# Patient Record
Sex: MALE | Race: OTHER
[De-identification: names, ages, dates, MRNs, and addresses within clinical notes are randomized per-mention and may not be internally consistent; named-entity substitution may affect disease eponyms.]

---

## 2022-03-08 ENCOUNTER — HOSPITAL ENCOUNTER (INPATIENT)
Dept: HOSPITAL 94 - ADULT MH | Age: 37
LOS: 16 days | Discharge: HOME | DRG: 750 | End: 2022-03-24
Attending: PSYCHIATRY & NEUROLOGY | Admitting: PSYCHIATRY & NEUROLOGY
Payer: MEDICAID

## 2022-03-08 VITALS — BODY MASS INDEX: 33.87 KG/M2 | HEIGHT: 77 IN | WEIGHT: 286.82 LBS

## 2022-03-08 DIAGNOSIS — Z71.6: ICD-10-CM

## 2022-03-08 DIAGNOSIS — Z86.16: ICD-10-CM

## 2022-03-08 DIAGNOSIS — F31.9: ICD-10-CM

## 2022-03-08 DIAGNOSIS — Z91.410: ICD-10-CM

## 2022-03-08 DIAGNOSIS — F19.10: ICD-10-CM

## 2022-03-08 DIAGNOSIS — K64.9: ICD-10-CM

## 2022-03-08 DIAGNOSIS — F12.10: ICD-10-CM

## 2022-03-08 DIAGNOSIS — G89.29: ICD-10-CM

## 2022-03-08 DIAGNOSIS — R42: ICD-10-CM

## 2022-03-08 DIAGNOSIS — R45.851: ICD-10-CM

## 2022-03-08 DIAGNOSIS — F17.210: ICD-10-CM

## 2022-03-08 DIAGNOSIS — Z23: ICD-10-CM

## 2022-03-08 DIAGNOSIS — L29.9: ICD-10-CM

## 2022-03-08 DIAGNOSIS — F41.9: ICD-10-CM

## 2022-03-08 DIAGNOSIS — Z59.00: ICD-10-CM

## 2022-03-08 DIAGNOSIS — Z79.899: ICD-10-CM

## 2022-03-08 DIAGNOSIS — F25.0: Primary | ICD-10-CM

## 2022-03-08 DIAGNOSIS — Z88.5: ICD-10-CM

## 2022-03-08 PROCEDURE — 93005 ELECTROCARDIOGRAM TRACING: CPT

## 2022-03-08 PROCEDURE — 36415 COLL VENOUS BLD VENIPUNCTURE: CPT

## 2022-03-08 PROCEDURE — 83036 HEMOGLOBIN GLYCOSYLATED A1C: CPT

## 2022-03-08 PROCEDURE — 90732 PPSV23 VACC 2 YRS+ SUBQ/IM: CPT

## 2022-03-08 PROCEDURE — 80164 ASSAY DIPROPYLACETIC ACD TOT: CPT

## 2022-03-08 PROCEDURE — 87081 CULTURE SCREEN ONLY: CPT

## 2022-03-08 PROCEDURE — 80061 LIPID PANEL: CPT

## 2022-03-08 SDOH — ECONOMIC STABILITY - HOUSING INSECURITY: HOMELESSNESS UNSPECIFIED: Z59.00

## 2022-03-09 VITALS — DIASTOLIC BLOOD PRESSURE: 85 MMHG | SYSTOLIC BLOOD PRESSURE: 136 MMHG

## 2022-03-09 PROCEDURE — 3E0234Z INTRODUCTION OF SERUM, TOXOID AND VACCINE INTO MUSCLE, PERCUTANEOUS APPROACH: ICD-10-PCS | Performed by: INTERNAL MEDICINE

## 2022-03-09 PROCEDURE — 3E02340 INTRODUCTION OF INFLUENZA VACCINE INTO MUSCLE, PERCUTANEOUS APPROACH: ICD-10-PCS | Performed by: INTERNAL MEDICINE

## 2022-03-09 RX ADMIN — OLANZAPINE SCH MG: 5 TABLET, ORALLY DISINTEGRATING ORAL at 21:18

## 2022-03-09 NOTE — NUR
Pt admitted from [facility] in [place]. Pt. appeared to be experiencing EPS upon arrival to 
unit, client appeared agitated, stating he was unable to control his eye movements with his 
eyes fixed to the ceiling. IM Ativan and Benadryl administered with effective results. Pt. 
experiencing auditory hallucinations telling him to kill himself and he should be dead. Pt. 
placed on 5150 for DTS/GD.

## 2022-03-10 VITALS — DIASTOLIC BLOOD PRESSURE: 91 MMHG | SYSTOLIC BLOOD PRESSURE: 133 MMHG

## 2022-03-10 VITALS — DIASTOLIC BLOOD PRESSURE: 77 MMHG | SYSTOLIC BLOOD PRESSURE: 133 MMHG

## 2022-03-10 LAB
CHOLEST SERPL-MCNC: 129 MG/DL (ref 0–200)
CHOLEST/HDLC SERPL: 3.1 {RATIO} (ref 0–4.99)
HBA1C MFR BLD: 5.5 % (ref 4.5–6.2)
HDLC SERPL-MCNC: 41 MG/DL (ref 35–60)
LDLC SERPL DIRECT ASSAY-MCNC: 76 MG/DL (ref 50–100)
TRIGL SERPL-MCNC: 70 MG/DL (ref 20–135)

## 2022-03-10 RX ADMIN — NICOTINE POLACRILEX PRN MG: 2 LOZENGE ORAL at 17:06

## 2022-03-10 RX ADMIN — DIVALPROEX SODIUM SCH MG: 250 TABLET, EXTENDED RELEASE ORAL at 20:04

## 2022-03-10 RX ADMIN — NICOTINE SCH PATCH: 21 PATCH, EXTENDED RELEASE TRANSDERMAL at 07:31

## 2022-03-10 RX ADMIN — OLANZAPINE SCH MG: 5 TABLET, ORALLY DISINTEGRATING ORAL at 07:30

## 2022-03-10 NOTE — NUR
Nursing Progress Note:



Legal hold: 5150



Client on involuntary status for DTS/DG



Report received from AGNES Demarco with use of SBAR



Why they are here:  Pt admitted from [facility] in [place]. Pt. appeared to be experiencing 
EPS upon arrival to unit, client appeared agitated, stating he was unable to control his eye 
movements with his eyes fixed to the ceiling. IM Ativan and Benadryl administered with 
effective results. Pt. experiencing auditory hallucinations telling him to kill himself and 
he should be dead. Pt. placed on 5150 for DTS/GD.





Assessment

What has happened this shift:  Patient is resting quietly in bed at the start of the shift.  
Cooperative with 1:1 assessment and medications.  Endorses hearing voices which he describes 
as sounding demonic and which tell him to kill himself.  Complains of anxiety r/t hearing 
voices.  PRN Ativan is given which appears somewhat helpful.  In the late morning the 
patient has an episode of yelling and swearing.  Provider is notified who gives an order for 
IM Benadryl/ Haldol/ Ativan which is effective.  



S/I, H/I: Denies

A/VH: Denies

Sleep: 1 hour in the morning

ADL's: Independent 

Group attendance: No

Were meds taken: Yes

Any med S/E: None observed or reported





Mental Status Exam



Appearance: Younger male, disheveled, dressed in scrubs 

Eye contact: Poor

Behavior: Cooperative 

Speech: Clear, normal rate/ volume

Mood: Fine.

Affect: Blunted with some brightening

Thought process: Circumstantial

Thought Content: Demonic voices that tell him to kill himself.

Cognition: A&O X4

Insight: Poor

Judgment: Poor



Interventions



PRN's used: Tylenol, Ativan 

Therapeutic interventions: Maintained a safe and therapeutic environment, ensured contract 
for safety, provided clear and simple instructions, monitored behaviors and need for 
intervention, provided active listening and positive encouragement, encouraged participation 
on the unit, and maintained q 15min safety checks. 

Restraints/seclusion/emergency medication: IM Benadryl/ Haldol/ Ativan

Justification of Continued Inpatient Treatment: PA pt. just had a recent medication 
adjustment and requires time to allow medications to reach their therapeutic effect. He will 
discharge sometime next week.

## 2022-03-10 NOTE — NUR
EMERGENCY IM



Pt was pacing the hallway, becoming increasingly agitated, stating that he doesn't want to 
be here and he hates taking Zyprexa. He was sitting in the rec room with his fists balled 
up, so called ZULEMA Rodriguez, during the phone call patient became more aggressive, screaming 
profanities. Haldol 10 mg IM, Benadryl 50 mg IM, and Ativan 2 mg IM were ordered. Security 
was at the bedside while injection was administered, and pt was cooperative with the 
process. 10 min after the injection, pt apologized to staff, stating "I just get so sick of 
this. I always feel suicidal".

## 2022-03-10 NOTE — NUR
Nursing Progress Note:



Legal hold: 5150



Client on involuntary status for DTS/DG



Report received from AGNES Alcaraz with use of SBAR



Why they are here:  Pt admitted from [facility] in [place]. Pt. appeared to be experiencing 
EPS upon arrival to unit, client appeared agitated, stating he was unable to control his eye 
movements with his eyes fixed to the ceiling. IM Ativan and Benadryl administered with 
effective results. Pt. experiencing auditory hallucinations telling him to kill himself and 
he should be dead. Pt. placed on 5150 for DTS/GD.





Assessment

What has happened this shift? Patient was found standing in sing[address] way at change of shift. 
Patient was polite and cooperative. Patient displayed manic behavior and kept asking for 
medications including ones he didnt have scheduled. Patient first asked for Tylenol for his 
right foot pain. Then he ask the nurse if he could have something for his hemorrhoids that 
had burst. Nurse messaged  and got an order for Proctofoam-hc. Patient was given 
scheduled Flu vaccine in L arm and Pneumonia vaccine in R arm. Patient took night 
medications including prn trazodone. Patient had to be given trazodone repeat. Patient was 
very talkative and spent a majority of the shift pacing around the unit and fidgeting with 
his headphones.  



S/I, H/I: Denies

A/VH: Denies

Sleep: See sleep assessment 

ADL's: independent 

Group attendance: n/a

Were meds taken: Yes

Any med S/E: None





Mental Status Exam



Appearance: Younger male, disheveled, dressed in scrubs 

Eye contact: Good

Behavior: Cooperative, talkative 

Speech: Clear

Mood: manic 

Affect: Blunted with some brightening

Thought process: Linear

Thought Content: medications

Cognition: A&O X4

Insight: Poor

Judgment: Poor



Interventions



PRN's used: Trazodone x2, Tylenol 



Therapeutic interventions: Maintained a safe and therapeutic environment, ensured contract 
for safety, provided clear and simple instructions, monitored behaviors and need for 
intervention, provided active listening and positive encouragement, encouraged participation 
on the unit, and maintained q 15min safety checks. 

Restraints/seclusion/emergency medication: N/A





Justification of Continued Inpatient Treatment: PA pt. just had a recent medication 
adjustment and requires time to allow medications to reach their therapeutic effect. He will 
discharge sometime next week.

## 2022-03-11 VITALS — SYSTOLIC BLOOD PRESSURE: 140 MMHG | DIASTOLIC BLOOD PRESSURE: 100 MMHG

## 2022-03-11 VITALS — DIASTOLIC BLOOD PRESSURE: 58 MMHG | SYSTOLIC BLOOD PRESSURE: 90 MMHG

## 2022-03-11 RX ADMIN — NICOTINE SCH PATCH: 21 PATCH, EXTENDED RELEASE TRANSDERMAL at 07:29

## 2022-03-11 RX ADMIN — DIVALPROEX SODIUM SCH MG: 250 TABLET, EXTENDED RELEASE ORAL at 20:18

## 2022-03-11 RX ADMIN — PALIPERIDONE SCH MG: 3 TABLET, EXTENDED RELEASE ORAL at 07:29

## 2022-03-11 NOTE — NUR
Psychosocial assessment. 



Mike is a 35 y/o male who was placed on 5150 by [facility] for 

danger to self and grave disability. Mike was initially placed on 

5150 on 2/28/22 and was positive for Covid. He continues to endorse SI 

with a plan to rip his eyes out. He reported command auditory 

hallucinations telling him to kill himself . He has a long history of 

mental health treatment and was released from [facility] in 2021. He 

reported on-going SI with worsening symptoms since being released from 

[facility]. 



Mike reported he was living with his brother in [place] but 

has "disowned" his family is now homeless. He was fixated on ways to kill 

himself and that he will do so once released. He also stated he does not feel safe.  He 
reported he would like to 

be conserved again. He denied any HI. He reported command auditory 

hallucinations telling him to harm himself.



Plan: Writer will work with Mike on a discharge plan. Writer will inquire

if [facility] plans on conserving him again.



SIDDHARTHA Salamanca

-------------------------------------------------------------------------------

Addendum: 03/11/22 at 1447 by Silvana TEJADA

-------------------------------------------------------------------------------

Amended: Links added.

## 2022-03-11 NOTE — NUR
Nursing Progress Note:



Legal hold: 5150



Client on involuntary status for DTS/DG



Report received from AGNES Alcaraz with use of SBAR



Why they are here:  Pt admitted from [facility] in [place]. Pt. appeared to be experiencing 
EPS upon arrival to unit, client appeared agitated, stating he was unable to control his eye 
movements with his eyes fixed to the ceiling. IM Ativan and Benadryl administered with 
effective results. Pt. experiencing auditory hallucinations telling him to kill himself and 
he should be dead. Pt. placed on 5150 for DTS/GD.





Assessment

What has happened this shift: 



Patient found nurse at change of shift. Patient asked nurse for some anxiety medication. 
Nurse gave patient prn Ativan. Patient then came to nurse asking for Tylenol for his foot. 
Patient came up to tech 20 minuets later stating he is feeling anxious and suicidal. Tech 
offered patient warm broth and assisted him to his room to lay down. Nurse checks on patient 
a few minuets later and patient asked if he could have another B2 shot. Nurse encouraged 
patient to allow time for the Ativan to kick in while charge calls doctor to see if we can 
have B2 on stand by in case the Ativan isn't enough. Nurse checked on patient a little while 
later and patent was observed laughing and talking with other patients. Patient took all 
night medications including prn trazodone to help him sleep. Patient took medications 
without issue and went to bed. 



S/I, H/I: Denies

A/VH: Denies

Sleep: See sleep assessment 

ADL's: Independent 

Group attendance: No

Were meds taken: Yes

Any med S/E: None observed or reported





Mental Status Exam



Appearance: Younger male, disheveled, dressed in personal clothing 

Eye contact: Poor

Behavior: Cooperative 

Speech: Clear, normal rate/ volume

Mood: Fine.

Affect: Blunted with some brightening

Thought process: Circumstantial

Thought Content: Getting another B2 shot

Cognition: A&O X4

Insight: Poor

Judgment: Poor



Interventions



PRN's used: Tylenol, Ativan 



Therapeutic interventions: Maintained a safe and therapeutic environment, ensured contract 
for safety, provided clear and simple instructions, monitored behaviors and need for 
intervention, provided active listening and positive encouragement, encouraged participation 
on the unit, and maintained q 15min safety checks. 



Restraints/seclusion/emergency medication: IM Benadryl/ Haldol/ Ativan

Justification of Continued Inpatient Treatment: PA pt. just had a recent medication 
adjustment and requires time to allow medications to reach their therapeutic effect. He will 
discharge sometime next week.

## 2022-03-11 NOTE — NUR
Nursing Progress Note: [place]



Legal hold: 5150



Client on involuntary status for DTS/DG



Report received from AGNES Demarco with use of SBAR



Why they are here:  Pt admitted from [facility] in [place]. Pt. appeared to be experiencing 
EPS upon arrival to unit, client appeared agitated, stating he was unable to control his eye 
movements with his eyes fixed to the ceiling. IM Ativan and Benadryl administered with 
effective results. Pt. experiencing auditory hallucinations telling him to kill himself and 
he should be dead. Pt. placed on 5150 for DTS/GD.



Assessment



What has happened this shift:  Patient is resting quietly in bed at the start of the shift.  
Cooperative with 1:1 assessment and medications.  Complains of anxiety in the morning r/t 
AH.  PRN Ativan is given which patient states is ineffective.  Headphones are given and the 
patient paces the unit.  He states, I tried walking and that breathing exercise.  I tried 
to lay down.  I tried everything and Im still panicking.  Provider notified who gives new 
orders for Klonopin.  Klonopin is given but patient complains that it is ineffective.  
Patient is rubbing his eyes vigorously and stating, They keep rolling back and I cant stop 
them.  Im going to go crazy!  Agitation continues to elevate.  Provider aware and gives a 
new order for IM Benadryl/ haldol/ ativan.  Patient becomes calmer almost immediately and 
returns to pacing the unit.



S/I, H/I: SI related to AH.  No plan

A/VH: Command AH telling him to kill himself

Sleep: 2 hours in the morning

ADL's: Independent 

Group attendance: Yes

Were meds taken: Yes

Any med S/E: None observed or reported





Mental Status Exam



Appearance: Younger male, disheveled, dressed in scrubs 

Eye contact: Poor

Behavior: Cooperative 

Speech: Clear, normal rate/ volume

Mood: anxious

Affect: Blunted with some brightening

Thought process: Circumstantial

Thought Content: Internally preoccupied

Cognition: A&O X4

Insight: Poor

Judgment: Poor



Interventions



PRN's used: Tylenol, Motrin, Ativan

Therapeutic interventions: Maintained a safe and therapeutic environment, ensured contract 
for safety, provided clear and simple instructions, monitored behaviors and need for 
intervention, provided active listening and positive encouragement, encouraged participation 
on the unit, and maintained q 15min safety checks. 

Restraints/seclusion/emergency medication: IM Benadryl/ Haldol/ Ativan

Justification of Continued Inpatient Treatment: PA pt. just had a recent medication 
adjustment and requires time to allow medications to reach their therapeutic effect. He will 
discharge sometime next week.


-------------------------------------------------------------------------------

Addendum: 03/11/22 at 1703 by Erin Julian RN

-------------------------------------------------------------------------------

In the late afternoon the patient again becomes increasingly agitated grabbing his head and 
hitting walls and door.  Provider is present who gives another order for IM benadryl/ 
haldol/ ativan.

## 2022-03-11 NOTE — NUR
Left message for Chiki Sims, Clinician with [facility] (ph# [phone]), to inquire 
about conservatorship.



SIDDHARTHA Salamanca

## 2022-03-12 VITALS — SYSTOLIC BLOOD PRESSURE: 131 MMHG | DIASTOLIC BLOOD PRESSURE: 75 MMHG

## 2022-03-12 VITALS — DIASTOLIC BLOOD PRESSURE: 81 MMHG | SYSTOLIC BLOOD PRESSURE: 122 MMHG

## 2022-03-12 RX ADMIN — NICOTINE POLACRILEX PRN MG: 2 LOZENGE ORAL at 17:48

## 2022-03-12 RX ADMIN — DIVALPROEX SODIUM SCH MG: 250 TABLET, EXTENDED RELEASE ORAL at 20:22

## 2022-03-12 RX ADMIN — NICOTINE SCH PATCH: 21 PATCH, EXTENDED RELEASE TRANSDERMAL at 07:30

## 2022-03-12 RX ADMIN — BENZTROPINE MESYLATE SCH MG: 1 TABLET ORAL at 20:22

## 2022-03-12 RX ADMIN — PALIPERIDONE SCH MG: 3 TABLET, EXTENDED RELEASE ORAL at 07:31

## 2022-03-12 NOTE — NUR
Nursing Progress Note: [place]



Legal hold: 5150



Client on involuntary status for DTS/DG



Report received from AGNES Luu with use of SBAR



Why they are here:  Pt admitted from [facility] in [place]. Pt. appeared to be experiencing 
EPS upon arrival to unit, client appeared agitated, stating he was unable to control his eye 
movements with his eyes fixed to the ceiling. IM Ativan and Benadryl administered with 
effective results. Pt. experiencing auditory hallucinations telling him to kill himself and 
he should be dead. Pt. placed on 5150 for DTS/GD.



Assessment



What has happened this shift:  Patient is resting quietly in bed at the start of the shift.  
Cooperative with 1:1 assessment and medications.  PRN Tylenol given for left ankle pain 
which appears effective.  Patient complains of anxiety.  PRN Ativan is given.  Shortly 
thereafter the patient approaches nursing complaining that his eyes are rolling back in his 
head again and he cant stop it.  Provider notified who gives order for Cogentin x1.  
Patient verbalizes relief from eye rolling but complains that the voices he hears are making 
him anxious.  Patient goes to his room where he begins hitting walls and repeating, I want 
to die!  New order for IM Benadryl/ Haldol/ Ativan is given and appears effective.



S/I, H/I: SI related to AH.  No plan

A/VH: Command AH telling him to kill himself

Sleep: 1 hour in the morning

ADL's: Independent 

Group attendance: NA

Were meds taken: Yes

Any med S/E: None observed or reported





Mental Status Exam



Appearance: Tall man, disheveled, dressed in green unit scrubs 

Eye contact: Poor

Behavior: Cooperative, agitated at times

Speech: Clear, normal rate/ volume

Mood: Terrible.

Affect: Blunted 

Thought process: Circumstantial

Thought Content: Internally preoccupied.  Concerned over his eyes rolling back in his head

Cognition: A&O X4

Insight: Poor

Judgment: Poor



Interventions



PRN's used: Tylenol, Ativan

Therapeutic interventions: Maintained a safe and therapeutic environment, ensured contract 
for safety, provided clear and simple instructions, monitored behaviors and need for 
intervention, provided active listening and positive encouragement, encouraged participation 
on the unit, and maintained q 15min safety checks. 

Restraints/seclusion/emergency medication: IM Benadryl/ Haldol/ Ativan

Justification of Continued Inpatient Treatment: PA pt. just had a recent medication 
adjustment and requires time to allow medications to reach their therapeutic effect. He will 
discharge sometime next week.

## 2022-03-12 NOTE — NUR
Nursing Progress Note: [place]



Legal hold: 5150



Client on involuntary status for DTS/DG



Report received from RN with use of SBAR



Why they are here:  Pt admitted from [facility] in [place]. Pt. appeared to be experiencing 
EPS upon arrival to unit, client appeared agitated, stating he was unable to control his eye 
movements with his eyes fixed to the ceiling. IM Ativan and Benadryl administered with 
effective results. Pt. experiencing auditory hallucinations telling him to kill himself and 
he should be dead. Pt. placed on 5150 for DTS/GD.



Assessment



What has happened this shift:  Pt in hallway at shift change and approaches this nurse 
stating that he has had a bad day and is very anxious. When asked what is causing him to be 
anxious pt reports being depressed and wanting to kill himself. Pt states that his legs are 
cramping up due to shaking so much and being tense. Pt has Klonopin 1mg, Gave early to pt 
for anxiety and Tylenol for leg cramping. Brought pt snacks and juice. Pt was then calm 
lying in bed. Brought pt evening meds at med pass, pt took meds w/o complications. Pt went 
to bed shortly after.

S/I, H/I: SI related to AH.  No plan

A/VH: Command AH telling him to kill himself

Sleep:See sleep hours

ADL's: Independent 

Group attendance: NA

Were meds taken: Yes

Any med S/E: None observed or reported





Mental Status Exam



Appearance: Tall man, disheveled, dressed in green unit scrubs 

Eye contact: Poor

Behavior: Cooperative, agitated at times

Speech: Clear, normal rate/ volume

Mood:bad day

Affect: Blunted 

Thought process: Circumstantial

Thought Content: Internally preoccupied. depressed

Cognition: A&O X4

Insight: Poor

Judgment: Poor



Interventions



PRN's used: Tylenol, Trazodone 50mg

Therapeutic interventions: Maintained a safe and therapeutic environment, ensured contract 
for safety, provided clear and simple instructions, monitored behaviors and need for 
intervention, provided active listening and positive encouragement, encouraged participation 
on the unit, and maintained q 15min safety checks. 

Restraints/seclusion/emergency medication: IM Benadryl/ Haldol/ Ativan

Justification of Continued Inpatient Treatment: PA pt. just had a recent medication 
adjustment and requires time to allow medications to reach their therapeutic effect. He will 
discharge sometime next week.

## 2022-03-12 NOTE — NUR
Initial: Pt admit for schizoaffective disorder. Currently on a regular diet 

and eating well with 100% PO intake of all meals with the exception of 

only 50% PO intake of starch at lunch 3/11. Overall pt meeting estimated 

nutrient needs. LBM 3/11. No nutrition intervention implemented at this 

time. Will continue to follow.

Recommendations:

1) Continue regular diet

2) Monitor need for additional protein for satiety; offer snacks

3) Bowel care PRN

4) Weekly scaled weights

-------------------------------------------------------------------------------

Addendum: 03/12/22 at 1309 by Venita Choudhary RD

-------------------------------------------------------------------------------

Amended: Links added.

## 2022-03-13 VITALS — SYSTOLIC BLOOD PRESSURE: 104 MMHG | DIASTOLIC BLOOD PRESSURE: 64 MMHG

## 2022-03-13 VITALS — SYSTOLIC BLOOD PRESSURE: 118 MMHG | DIASTOLIC BLOOD PRESSURE: 51 MMHG

## 2022-03-13 RX ADMIN — NICOTINE SCH PATCH: 21 PATCH, EXTENDED RELEASE TRANSDERMAL at 07:58

## 2022-03-13 RX ADMIN — NICOTINE POLACRILEX PRN MG: 2 LOZENGE ORAL at 11:19

## 2022-03-13 RX ADMIN — NICOTINE POLACRILEX PRN MG: 2 LOZENGE ORAL at 20:39

## 2022-03-13 RX ADMIN — BENZTROPINE MESYLATE SCH MG: 1 TABLET ORAL at 07:59

## 2022-03-13 RX ADMIN — BENZTROPINE MESYLATE SCH MG: 1 TABLET ORAL at 20:34

## 2022-03-13 RX ADMIN — DIVALPROEX SODIUM SCH MG: 250 TABLET, EXTENDED RELEASE ORAL at 20:34

## 2022-03-13 NOTE — NUR
Nursing Progress Note: 



Legal hold: 5250



Client on involuntary status for DTS/DG



Report received from ENIO Delaney with use of SBAR



Why they are here:  Pt admitted from [facility] in [place]. Pt. appeared to be experiencing 
EPS upon arrival to unit, client appeared agitated, stating he was unable to control his eye 
movements with his eyes fixed to the ceiling. IM Ativan and Benadryl administered with 
effective results. Pt. experiencing auditory hallucinations telling him to kill himself and 
he should be dead. Pt. placed on 5150 for DTS/GD.



Assessment



Received patient while he was ambulated up and down the hallways before breakfast. Went to 
patients room to administer his 0800 medications, complete Physical Assessment and 1:1 
Patient Interview. Patient reports I have been hearing voices.  Im not sure what they want 
me to do, but I still hear them.  Patient went to the Community Room for breakfast and 
socialized with others. Patient pleasant when interacting with others. Patient slept most of 
the morning, and was awake for snack then lunch. At approximately 1226, patient had seen 
PHILIPPE Andrade. Lupe ordered Haldol 5mg and Ativan 1mg po now. Given at 1226. Patient continued 
to experience hallucinations, not known if they are auditory or visual hallucinations as 
patient did not disclose. Encourage the patient to lie down on his bed and rest after these 
medications administered. At approximately 1446, Lupe gave a verbal order for Haldol 2mg po 
which was given immediately. Again, reinforced that patient lie down in his bed, and patient 
was walking up and down the hallways with his eyes almost closed. Patient went directly to 
Lupe at 1641 and Lupe gave an order for Ativan 1mg po now. Patient asked for Tylenol for 
his foot at 1700. Patient was redirected to return to his room. (1730) Patient informed that 
resting does not help and stated I want to leave today.  Informed patient that he can 
discuss the medications with the MD in the morning. Patient left the door of the charting 
room and returned to the dining room. Will continue to monitor patient closely.  At 1746, 
the patient walked up to the fire door near room 330, and started hitting the door, 
approximately 5 times saying You arent doing any damn thing for me here and Im leaving.  
Lupe consulted and patient received Geodon 20mg IM, Benadryl 50 mg IM, and Ativan 2mg IM. 
Security was called when patient started hitting the fire doors and responded promptly.      
 





S/I, H/I: Denies

A/VH: Patient states Im having hallucinations, but will not disclose if they are auditory 
or 

Sleep: 2 hour morning nap.

ADL's: Independent 

Group attendance: No Group Meeting held today.

Were meds taken: Yes, without hesitation.

Any med S/E: None observed or reported





Mental Status Exam



Appearance: Tall man, disheveled, dressed in green unit scrubs 

Eye contact: Poor

Behavior: Cooperative, agitated at times

Speech: Clear, normal rate/ volume

Mood: Shauna been hearing voices. 

Affect: Blunted 

Thought process: Circumstantial

Thought Content: 

Cognition: A&O X4

Insight: Poor

Judgment: Poor



Interventions



PRN's used: Tylenol, Trazodone 50mg

Therapeutic interventions: Maintained a safe and therapeutic environment, ensured contract 
for safety, provided clear and simple instructions, monitored behaviors and need for 
intervention, provided active listening and positive encouragement, encouraged participation 
on the unit, and maintained q 15min safety checks. 

Restraints/seclusion/emergency medication: IM Benadryl/ Haldol/ Ativan

Justification of Continued Inpatient Treatment: PA pt. just had a recent medication 
adjustment and requires time to allow medications to reach their therapeutic effect. He will 
discharge sometime next week.

## 2022-03-14 VITALS — SYSTOLIC BLOOD PRESSURE: 98 MMHG | DIASTOLIC BLOOD PRESSURE: 48 MMHG

## 2022-03-14 VITALS — SYSTOLIC BLOOD PRESSURE: 133 MMHG | DIASTOLIC BLOOD PRESSURE: 82 MMHG

## 2022-03-14 RX ADMIN — NICOTINE POLACRILEX PRN MG: 2 LOZENGE ORAL at 12:11

## 2022-03-14 RX ADMIN — NICOTINE SCH PATCH: 21 PATCH, EXTENDED RELEASE TRANSDERMAL at 08:57

## 2022-03-14 RX ADMIN — NICOTINE POLACRILEX PRN MG: 2 LOZENGE ORAL at 19:45

## 2022-03-14 RX ADMIN — BENZTROPINE MESYLATE SCH MG: 1 TABLET ORAL at 08:56

## 2022-03-14 RX ADMIN — BENZTROPINE MESYLATE SCH MG: 1 TABLET ORAL at 21:06

## 2022-03-14 RX ADMIN — NICOTINE POLACRILEX PRN MG: 2 LOZENGE ORAL at 17:51

## 2022-03-14 RX ADMIN — DIVALPROEX SODIUM SCH MG: 250 TABLET, EXTENDED RELEASE ORAL at 21:07

## 2022-03-14 NOTE — NUR
Nursing Progress Note: 



Legal hold: 5250



Client on involuntary status for DTS/DG



Report received from ENIO Delaney with use of SBAR



Why they are here:  Pt admitted from [facility] in [place]. Pt. appeared to be experiencing 
EPS upon arrival to unit, client appeared agitated, stating he was unable to control his eye 
movements with his eyes fixed to the ceiling. IM Ativan and Benadryl administered with 
effective results. Pt. experiencing auditory hallucinations telling him to kill himself and 
he should be dead. Pt. placed on 5150 for DTS/GD.



Assessment

Pt asleep at shift change. Pt woken around 1900 by roommate. Asked pt how he was feeling, pt 
stated he was fine, asked he was hearing voices, "not right now." Pt joined cohorts in 
community room. Pt ate snack at snack time, asked for a sandwich  because he did not get a 
dinner. Chips and a sandwich were provided. Pt asked for a nicotine lozenge with evening 
meds. Pt took evening meds w/o complications. Pt went to bed shortly after. 

S/I, H/I: Denies

A/VH: Denies, "not right now"

Sleep: See sleep hours

ADL's: Independent 

Group attendance: No Group in the evening

Were meds taken: Yes, without hesitation.

Any med S/E: None observed or reported





Mental Status Exam



Appearance: Tall man, disheveled, dressed in green unit scrubs 

Eye contact: Poor

Behavior: Cooperative,groggy

Speech: Clear, normal rate/ volume

Mood: sluggish, tired

Affect: Blunted 

Thought process: Circumstantial

Thought Content: 

Cognition: A&O X4

Insight: Poor

Judgment: Poor



Interventions



PRN's used:  Trazodone 50mg, Nicotine lozenge

Therapeutic interventions: Maintained a safe and therapeutic environment, ensured contract 
for safety, provided clear and simple instructions, monitored behaviors and need for 
intervention, provided active listening and positive encouragement, encouraged participation 
on the unit, and maintained q 15min safety checks. 

Restraints/seclusion/emergency medication: IM Benadryl/ Haldol/ Ativan

Justification of Continued Inpatient Treatment: PA pt. just had a recent medication 
adjustment and requires time to allow medications to reach their therapeutic effect. He will 
discharge sometime next week.

## 2022-03-14 NOTE — NUR
Nursing Progress Note: 

Legal hold: 5250

Client on involuntary status for DTS/DG

Report received from ENIO Naidu with use of SBAR

Why they are here:  Pt admitted from [facility] in [place]. Pt. appeared to be experiencing 
EPS upon arrival to unit, client appeared agitated, stating he was unable to control his eye 
movements with his eyes fixed to the ceiling. IM Ativan and Benadryl administered with 
effective results. Pt. experiencing auditory hallucinations telling him to kill himself and 
he should be dead. Pt. placed on 5150 for DTS/GD.

Assessment

RN received pt. asleep at start of shift. Pt. refused breakfast and went back to sleep. Pt. 
awoke mid-morning. 1:1 done at bedside. Pt. endorses hearing voices, but not currently. Pt. 
reports anxiety about his discharge plan. Pt. spoke with his brother and seemed to be happy 
that his brother offered to let him come back to live with him. However, pt. later stated, 
I destroyed my brothers earpods, hes going to be pissed when I tell him I actually dont 
want to go back to live with my brother. Pt. then states that he wants to be conserved. Pt. 
becomes increasingly agitated throughout the afternoon and given PRN Atarax 100mg po and 
Haldol 5mg po with minimal effect. RN received order for Ativan 2mg IM and pt. received with 
good effect. 

S/I, H/I: Denies

A/VH: Not right now.  

Sleep: Pt. slept in until mid-morning. Pt. napped intermittently throughout the day. 

ADL's: Independent. Pt. showered today. 

Group attendance: No

Were meds taken: Yes

Any med S/E: Denies. None observed. 

Mental Status Exam

Appearance: Tall man, disheveled, wearing green unit scrubs 

Eye contact: Minimal 

Behavior: Cooperative, but easily becomes anxious and agitated. Pt. socially withdrawn. 
Pacing the hallway. 

Speech: Pt. becomes hyperverbal with pressured speech at times.

Mood: Anxious and agitated. 

Affect: Blunted 

Thought process: Circumstantial

Thought Content: Concern about discharge plan. 

Cognition: A&O X4

Insight: Poor

Judgment: Poor

Interventions

PRN's used:  Atarax 100mg x1, Haldol 5mg po. Ativan 2mg IM 

Therapeutic interventions: Maintained a safe and therapeutic environment, ensured contract 
for safety, provided clear and simple instructions, monitored behaviors and need for 
intervention, provided active listening and positive encouragement, encouraged participation 
on the unit, and maintained q 15min safety checks. 

Restraints/seclusion/emergency medication: None

Justification of Continued Inpatient Treatment: PA pt. just had a recent medication 
adjustment and requires time to allow medications to reach their therapeutic effect. He will 
discharge sometime next week.

## 2022-03-14 NOTE — NUR
MED LIST FROM [facility]



Most recent med list from [facility]:



Clonazepam 1.5 mg Q8H

Haldol 10 mg QHS

Prazosin 1 mg QHS

Wellbutrin  mg QAM

Depakote 1750 MG QHS

Clomipramine 50 mg QHS

Cogentin 1mg at BID

 mg BID

Senna 8.6 mg 2 tabs BID 





SIDDHARTHA Salamanca

## 2022-03-14 NOTE — NUR
Spoke to Chiki with [facility] ([phone]) and informed him that Mike wants to 
get conserved again. He reported Mike was recently released from conservatorship and 
his brother, Dae, offered 3rd party assistance. He reported Mike had been kicked out 
of his placement while conserved due to punching holes in the walls and property damage. He 
reported that was when his brother offered 3rd party. The brother, Dae, lives on [address] in [place]. He reported since getting off conservatorship, Mike, has had 
quite a few contacts with law enforcement as well as hospitalizations at [facility]. Chiki 
suggested writer call Dae to see if Mike can return to his home despite Mike 
stating he is homeless. Chiki reported he has a tx meeting on Tues in which Public Guardian 
is involved and he will inquire about conservatorship.



Called Mike's brother, Dae Christiansen (ph# [phone]). He was unaware that Mike 
is at [facility], he thought he had gone to [facility]. He reported Mike tends to stop his 
meds, use maijuana, and "go off the hook". He reported he last saw Mike when he was 
picked up by police due to his behavior. He reported Mike can return to his home as 
long as he continues to take his medications. Dae did say that he works full time and 
cannot monitor Mike 24/7. He reported he thinks Mike should probably get conserved 
again.



SIDDHARTHA Salamanca

## 2022-03-15 VITALS — SYSTOLIC BLOOD PRESSURE: 118 MMHG | DIASTOLIC BLOOD PRESSURE: 54 MMHG

## 2022-03-15 VITALS — DIASTOLIC BLOOD PRESSURE: 68 MMHG | SYSTOLIC BLOOD PRESSURE: 133 MMHG

## 2022-03-15 RX ADMIN — BENZTROPINE MESYLATE SCH MG: 1 TABLET ORAL at 20:48

## 2022-03-15 RX ADMIN — NICOTINE POLACRILEX PRN MG: 2 LOZENGE ORAL at 13:46

## 2022-03-15 RX ADMIN — NICOTINE POLACRILEX PRN MG: 2 LOZENGE ORAL at 17:24

## 2022-03-15 RX ADMIN — BENZTROPINE MESYLATE SCH MG: 1 TABLET ORAL at 08:35

## 2022-03-15 RX ADMIN — NICOTINE SCH PATCH: 21 PATCH, EXTENDED RELEASE TRANSDERMAL at 08:35

## 2022-03-15 RX ADMIN — DIVALPROEX SODIUM SCH MG: 250 TABLET, EXTENDED RELEASE ORAL at 20:48

## 2022-03-15 NOTE — NUR
Pt. attended group today.  He participated in an art expression called, the Path to 
Wellness where they had to draw out their path to wellness in picture form with steps they 
will take to keep them well. Pt. engaged well, at first he seemed a bit reluctant but once 
he started drawing he appeared to enjoy himself. He wrote out some appropriate steps he felt 
he needed to keep himself well. He was able to appropriately share his thoughts with group. 
At the end of the group he reported that he will be coming back to groups because he felt 
that was very helpful. His thought content and thought process were WNL. His demeanor was 
calm and pleasant. 



Jacquelyn Hardin LCSW

## 2022-03-15 NOTE — NUR
Nursing Progress Note:



Legal hold: 5250

Client on involuntary status for DTS/DG



Report received from ENIO Alcaraz with use of SBAR



Why they are here:  

Pt admitted from [facility] in [place]. Pt. appeared to be experiencing EPS upon arrival to 
unit, client appeared agitated, stating he was unable to control his eye movements with his 
eyes fixed to the ceiling. IM Ativan and Benadryl administered with effective results. Pt. 
experiencing auditory hallucinations telling him to kill himself and he should be dead. Pt. 
placed on 5150 for DTS/GD.



Assessment

What happened this shift:

Patient watching TV with peers at the beginning of shift. Pleasant and cooperative with 
care; compliant with medication. PRN Trazodone and Nicotine lozenges provided; Nicotine 
patch removed/discarded by staff. Patient denies SI, HI, A/VH this shift. Reports feeling 
happy with conservatorship or going back to live with his brother. Patient continued, "I 
didn't realize my brother cared but actually he does." Patient participated in HS snack 
prior to bed; observed sleeping and does not appear to be having difficulty. 



S/I, H/I: Denies

A/VH: Denies 

Sleep: Refer to sleep assessment 

ADL's: Independent

Group attendance: No

Were meds taken: Yes

Any med S/E: Denies. None observed or reported 



Mental Status Exam



Appearance: Neat, appropriately dressed in green unit attire 

Eye contact: Fair

Behavior: Pleasant and cooperative, social  

Speech: Clear, audible, somewhat pressured

Mood: Euthymic, mild anxiety 

Affect: Blunted 

Thought process: Linear

Thought Content: Meeting needs, brother, concern for discharge plan 

Cognition: A&O X4

Insight: Poor

Judgment: Poor



Interventions

PRN's used:  Trazodone and Nicotine lozenges 



Therapeutic interventions: Maintained a safe and therapeutic environment, ensured contract 
for safety, provided clear and simple instructions, monitored behaviors and need for 
intervention, provided active listening and positive encouragement, encouraged participation 
on the unit, and maintained q 15min safety checks. 



Restraints/seclusion/emergency medication: None



Justification of Continued Inpatient Treatment: PA pt. just had a recent medication 
adjustment and requires time to allow medications to reach their therapeutic effect. He will 
discharge sometime next week.

## 2022-03-15 NOTE — NUR
Spoke to Chiki at [facility] (ph#[phone]). He reported he spoke to Mike's 
brother, Dae, who stated he will continue to offer 3rd party support contingent upon 
medication compliance and abstaining from substances. 



Chiki requested a conference call with himself, Dae, Mike, and writer to discuss 
Mike returning to Dae's home and what is expected of him.



Plan:  Conference call is tentatively scheduled for noon tomorrow.



SIDDHARTHA Salamanca

## 2022-03-15 NOTE — NUR
Nursing Progress Note: 

Legal hold: 5250

Client on involuntary status for DTS/DG

Report received from ENIO Demarco with use of SBAR

Why they are here:  Pt admitted from [facility] in [place]. Pt. appeared to be experiencing 
EPS upon arrival to unit, client appeared agitated, stating he was unable to control his eye 
movements with his eyes fixed to the ceiling. IM Ativan and Benadryl administered with 
effective results. Pt. experiencing auditory hallucinations telling him to kill himself and 
he should be dead. Pt. placed on 5150 for DTS/GD.

Assessment

RN received pt. asleep at start of shift. Pt. awoke for breakfast and took all medication. 
Pt. reports that he is no longer hearing voices nor does he feels suicidal. Pt. reports he 
does not want to go back to live with his brother but would rather be conserved. In the 
afternoon pt. became increasingly anxious and received PRN Atarax 100mg and Haldol 5mg po 
with good effect. Pt. observed alternating between pacing and lying in his bed and napping. 

S/I, H/I: Denies

A/VH: Denies  

Sleep: Pt. slept 7.75 hrs on NOC shift and napped intermittently throughout the day. 

ADL's: Independent. Pt. showered today. 

Group attendance: No

Were meds taken: Yes

Any med S/E: Denies. None observed. 

Mental Status Exam

Appearance: Clean and neat, wearing green scrubs. 

Eye contact: Minimal 

Behavior: Cooperative, anxious, pacing hallway or withdrawing to his room and lying in bed.  


Speech: Pt. becomes hyper-verbal with pressured speech at times.

Mood: Anxious

Affect: Blunted 

Thought process: Linear

Thought Content: Perseverates on wanting to be conserved.  

Cognition: A&O X4

Insight: Poor

Judgment: Poor

Interventions

PRN's used:  Atarax 100mg x1, Haldol 5mg po 

Therapeutic interventions: Maintained a safe and therapeutic environment, ensured contract 
for safety, provided clear and simple instructions, monitored behaviors and need for 
intervention, provided active listening and positive encouragement, encouraged participation 
on the unit, and maintained q 15min safety checks. 

Restraints/seclusion/emergency medication: None

Justification of Continued Inpatient Treatment: PA pt. just had a recent medication 
adjustment and requires time to allow medications to reach their therapeutic effect. He will 
discharge sometime next week.

## 2022-03-16 VITALS — DIASTOLIC BLOOD PRESSURE: 67 MMHG | SYSTOLIC BLOOD PRESSURE: 105 MMHG

## 2022-03-16 VITALS — SYSTOLIC BLOOD PRESSURE: 104 MMHG | DIASTOLIC BLOOD PRESSURE: 60 MMHG

## 2022-03-16 RX ADMIN — NICOTINE POLACRILEX PRN MG: 2 LOZENGE ORAL at 19:44

## 2022-03-16 RX ADMIN — NICOTINE POLACRILEX PRN MG: 2 LOZENGE ORAL at 16:54

## 2022-03-16 RX ADMIN — NICOTINE POLACRILEX PRN MG: 2 LOZENGE ORAL at 14:25

## 2022-03-16 RX ADMIN — BENZTROPINE MESYLATE SCH MG: 1 TABLET ORAL at 07:30

## 2022-03-16 RX ADMIN — NICOTINE POLACRILEX PRN MG: 2 LOZENGE ORAL at 08:12

## 2022-03-16 RX ADMIN — NICOTINE SCH PATCH: 21 PATCH, EXTENDED RELEASE TRANSDERMAL at 07:31

## 2022-03-16 RX ADMIN — DIVALPROEX SODIUM SCH MG: 250 TABLET, EXTENDED RELEASE ORAL at 19:41

## 2022-03-16 RX ADMIN — BENZTROPINE MESYLATE SCH MG: 1 TABLET ORAL at 19:41

## 2022-03-16 NOTE — NUR
Pt. attended group today. Each pt. scaled their mood and anxiety level today to practice 
scaling. We discussed how this can be used as a safety plan tying coping skills to each 
increment on the scale. Pts. then took some time to fill out a Strengths/Qualities sheet and 
shared with the group. Pt. engaged well in the group. He shared his strengths and qualities 
willingly with the group. He was alert and oriented X 4. His thought content was a but hard 
to follow at times but he was able to get his basic thoughts across. He appeared to enjoy 
socializing with his peers. His demeanor was calm and pleasant. 



Jacquelyn Hardin LCSW

## 2022-03-16 NOTE — NUR
Nursing Progress Note: 

Legal hold: 5250

Client on involuntary status for DTS/DG

Report received from ENIO Demarco with use of SBAR

Why they are here:  Pt admitted from [facility] in [place]. Pt. appeared to be experiencing 
EPS upon arrival to unit, client appeared agitated, stating he was unable to control his eye 
movements with his eyes fixed to the ceiling. IM Ativan and Benadryl administered with 
effective results. Pt. experiencing auditory hallucinations telling him to kill himself and 
he should be dead. Pt. placed on 5150 for DTS/GD.

Assessment

RN received pt. asleep in bed at start of shift. Pt. awoke before breakfast and took all 
medications. 1:1 done at bedside, pt. reports increased anxiety, states, I was told now 
that Im not going to be conserved I dont want to move back in with my brother. Pt. 
denies all psych symptoms. Pt. pacing the singh and clenching his fist. Pt. offered PRN 
medication and given Atarax 100mg and Haldol 5mg po with moderate effect. In the afternoon 
pt. reported feeling better and feels like his medications are helping him. 

S/I, H/I: Denies

A/VH: Denies  

Sleep: Pt. slept 7 hrs on NOC shift and napped intermittently throughout the day. 

ADL's: Independent. 

Group attendance: Yes

Were meds taken: Yes

Any med S/E: Denies. None observed. 

Mental Status Exam

Appearance: Clean and neat, wearing green scrubs. 

Eye contact: Minimal 

Behavior: Cooperative, anxious, pacing hallway or withdrawing to his room and lying in bed.  


Speech: Pt. becomes hyper-verbal with pressured speech at times.

Mood: Anxious

Affect: Blunted 

Thought process: Linear

Thought Content: Perseverates on not wanting to go back to live with his brother.   

Cognition: A&O X4

Insight: Poor

Judgment: Poor

Interventions

PRN's used:  Atarax 100mg x1, Haldol 5mg po x1

Therapeutic interventions: Maintained a safe and therapeutic environment, ensured contract 
for safety, provided clear and simple instructions, monitored behaviors and need for 
intervention, provided active listening and positive encouragement, encouraged participation 
on the unit, and maintained q 15min safety checks. 

Restraints/seclusion/emergency medication: None

Justification of Continued Inpatient Treatment: PA pt. just had a recent medication 
adjustment and requires time to allow medications to reach their therapeutic effect. He will 
discharge sometime next week.

## 2022-03-16 NOTE — NUR
Nursing Progress Note: 

Legal hold: 5250

Client on involuntary status for DTS/DG



Report received from ENIO Alcaraz with use of SBAR

Why they are here:  Pt admitted from [facility] in [place]. Pt. appeared to be experiencing 
EPS upon arrival to unit, client appeared agitated, stating he was unable to control his eye 
movements with his eyes fixed to the ceiling. IM Ativan and Benadryl administered with 
effective results. Pt. experiencing auditory hallucinations telling him to kill himself and 
he should be dead. Pt. placed on 5150 for DTS/GD.



Assessment



What happened this shift:

Pt was asking to shave at change of shift and was assisted with shaving. Pt states his day 
is "really good" and smiles. Pt is pleasant cooperative and socializing with peers and 
staff. Pt spent a lot of time walking in the singh.  Pt took meds at HS med pass.  Pt asked 
for ibuprofen for sore feet and was provided with prn before going to bed. 



S/I, H/I: Denies

A/VH: Denies  

Sleep: see sleep hours  

ADL's: Independent. Pt. showered today. 

Group attendance: No

Were meds taken: Yes

Any med S/E: Denies. None observed. 



Mental Status Exam

Appearance: Clean and neat, wearing green scrubs. 

Eye contact: Minimal 

Behavior: Cooperative, anxious, pacing hallway or withdrawing to his room and lying in bed.  


Speech: Pt. becomes hyper-verbal with pressured speech at times.

Mood: Anxious

Affect: Blunted 

Thought process: Linear

Thought Content: Perseverates on wanting to be conserved.  

Cognition: A&O X4

Insight: Poor

Judgment: Poor

Interventions

PRN's used:  trazodone, ibuprofen

Therapeutic interventions: Maintained a safe and therapeutic environment, ensured contract 
for safety, provided clear and simple instructions, monitored behaviors and need for 
intervention, provided active listening and positive encouragement, encouraged participation 
on the unit, and maintained q 15min safety checks. 

Restraints/seclusion/emergency medication: None

Justification of Continued Inpatient Treatment: PA pt. just had a recent medication 
adjustment and requires time to allow medications to reach their therapeutic effect. He will 
discharge sometime next week.

## 2022-03-17 VITALS — SYSTOLIC BLOOD PRESSURE: 115 MMHG | DIASTOLIC BLOOD PRESSURE: 59 MMHG

## 2022-03-17 VITALS — DIASTOLIC BLOOD PRESSURE: 83 MMHG | SYSTOLIC BLOOD PRESSURE: 129 MMHG

## 2022-03-17 VITALS — SYSTOLIC BLOOD PRESSURE: 133 MMHG | DIASTOLIC BLOOD PRESSURE: 76 MMHG

## 2022-03-17 RX ADMIN — DIVALPROEX SODIUM SCH MG: 250 TABLET, EXTENDED RELEASE ORAL at 20:13

## 2022-03-17 RX ADMIN — DOCUSATE SODIUM SCH MG: 100 CAPSULE, LIQUID FILLED ORAL at 20:12

## 2022-03-17 RX ADMIN — BENZTROPINE MESYLATE SCH MG: 1 TABLET ORAL at 20:14

## 2022-03-17 RX ADMIN — BENZTROPINE MESYLATE SCH MG: 1 TABLET ORAL at 08:15

## 2022-03-17 RX ADMIN — NICOTINE SCH PATCH: 21 PATCH, EXTENDED RELEASE TRANSDERMAL at 08:17

## 2022-03-17 RX ADMIN — NICOTINE POLACRILEX PRN MG: 2 LOZENGE ORAL at 11:04

## 2022-03-17 RX ADMIN — NICOTINE POLACRILEX PRN MG: 2 LOZENGE ORAL at 15:54

## 2022-03-17 NOTE — NUR
Nursing Progress Note:



Legal hold: 5250 for being a danger to himself and gravely disabled



Report received from Brody Alcaraz Charge RN



Why they are here:  Pt admitted from [facility] in [place]. Pt. appeared to be experiencing 
EPS upon arrival to unit, client appeared agitated, stating he was unable to control his eye 
movements with his eyes fixed to the ceiling. IM Ativan and Benadryl administered with 
effective results. Pt. experiencing auditory hallucinations telling him to kill himself and 
he should be dead. Pt. placed on 5150 for DTS/GD.





Assessment



What has happened this shift: The patient was pleasant when approached for the evening 
nursing assessment. He stated he was concerned about having to give up coffee when he went 
home to live with his brother and seemed to be perseverating about that issue.  He stated he 
felt depressed and his anxiety was high. He denied suicidal thoughts or thoughts to harm 
others. He was medication compliant and denied medication side effects. He appeared clean 
and well groomed. He answered questions appropriately to what was asked. He has not had any 
behaviors that have required staff to intervene. Affect was blunted. Speech was spontaneous 
and with a moderate rate and volume. 



Justification of Continued Inpatient Treatment: Medication stabilization continues and the 
plan is for the patient to be transitioned to a long acting injectable for better medication 
compliance.

## 2022-03-17 NOTE — NUR
Nursing Progress Note:



Legal hold: 5250



Client on involuntary status for DTS/GD



Report received from ENIO Demarco with use of SBAR



Why they are here:  

Pt admitted from [facility] in [place]. Pt. appeared to be experiencing EPS upon arrival to 
unit, client appeared agitated, stating he was unable to control his eye movements with his 
eyes fixed to the ceiling. IM Ativan and Benadryl administered with effective results. Pt. 
experiencing auditory hallucinations telling him to kill himself and he should be dead. Pt. 
placed on 5150 for DTS/GD.



Assessment

What happened this shift: Pt was up for breakfast. Pt was cooperative with medications. Pt 
rated his depression at a 2/10. Pt denied SI/HI/AH/VH. Pt reports that he is bored. Pt c/o 
6/10 bilateral foot pain from walking around at 1104 and was given PRN Motrin 600 mg along 
with a nicotine lozenge with good effect. When hospitalist Dr Dawkins came to see the patient 
today, pt complained of dizziness and rectal bleeding after BMs. Dr Dawkins ordered a set of 
orthostatic vital signs which was negative for orthostatic hypotension and Colace 100 mg PO 
BID. 



S/I, H/I: Pt denies

A/VH: Pt denies 

Sleep: Pt slept 8.5 hours last night per noc shift report.

ADL's: Independent

Group attendance: No

Were meds taken: Yes

Any med S/E: Pt c/o dizziness.



Mental Status Exam



Appearance: Tall, large man with short brown hair and a small balding patch back of head 
dressed initially in black sweats and a black and white plaid shirt but then changed in to 
clean green unit scrubs. 

Eye contact: Fair to good.

Behavior: Pleasant, cooperative, paces, listens to radio headphones.

Speech: Clear, audible.

Mood: "Bored." 

Affect: Blunted 

Thought process: Linear

Thought Content: He's bored, dizzy, and his feet hurt. He doesn't like to draw or read and 
he doesn't know how to play Solitaire.

Cognition: A&O X4

Insight: Poor

Judgment: Poor



Interventions

PRN's used: Ibuprofen, nicotine lozenges 



Therapeutic interventions: 1:1 assessment, establishment of rapport, therapeutic 
communication, active listening, ensured contract for safety, provided clear and simple 
instructions, medication administration/education/monitoring, encouragement to attend 
groups, behavior monitoring and intervention as needed; distraction, redirection, coping 
skills education,  positive reinforcement, and maintained Q 15 minute safety checks. 



Restraints/seclusion/emergency medication: None



Justification of Continued Inpatient Treatment: Pt in need of crisis interruption and 
medication adjustments and monitoring in a safe and therapeutic environment until stable.

## 2022-03-18 VITALS — DIASTOLIC BLOOD PRESSURE: 75 MMHG | SYSTOLIC BLOOD PRESSURE: 120 MMHG

## 2022-03-18 VITALS — SYSTOLIC BLOOD PRESSURE: 119 MMHG | DIASTOLIC BLOOD PRESSURE: 71 MMHG

## 2022-03-18 RX ADMIN — NICOTINE SCH PATCH: 21 PATCH, EXTENDED RELEASE TRANSDERMAL at 08:18

## 2022-03-18 RX ADMIN — BENZTROPINE MESYLATE SCH MG: 1 TABLET ORAL at 20:05

## 2022-03-18 RX ADMIN — DIVALPROEX SODIUM SCH MG: 250 TABLET, EXTENDED RELEASE ORAL at 20:04

## 2022-03-18 RX ADMIN — DOCUSATE SODIUM SCH MG: 100 CAPSULE, LIQUID FILLED ORAL at 08:13

## 2022-03-18 RX ADMIN — BENZTROPINE MESYLATE SCH MG: 1 TABLET ORAL at 08:13

## 2022-03-18 RX ADMIN — NICOTINE POLACRILEX PRN MG: 2 LOZENGE ORAL at 16:47

## 2022-03-18 RX ADMIN — DOCUSATE SODIUM SCH MG: 100 CAPSULE, LIQUID FILLED ORAL at 20:05

## 2022-03-18 NOTE — NUR
Nursing Progress Note:



Legal hold: 5250



Client on involuntary status for DTS/GD



Report received from ENIO Murphy with use of SBAR



Why they are here:  

Pt admitted from [facility] in [place]. Pt. appeared to be experiencing EPS upon arrival to 
unit, client appeared agitated, stating he was unable to control his eye movements with his 
eyes fixed to the ceiling. IM Ativan and Benadryl administered with effective results. Pt. 
experiencing auditory hallucinations telling him to kill himself and he should be dead. Pt. 
placed on 5150 for DTS/GD.



Assessment

What happened this shift: Patient was observed pacing hallways at beginning of shift. 
Patient complains of feeling uneasy and ask for B2 shot. Nurse encouraged patient to lay 
down and try to relax. Patient asked nurse for B2 shot 2 more times despite patient eating 
snacks and socializing with other patients calmly. Patient complains of being bored and 
continues to ask nurse for a variety of medications. Patient took all night medications and 
refused prn trazodone stating it was making him sleep in too late. Patient went to bed after 
finishing snack. Patient was not given Haldol IM due to patient being already asleep.   





S/I, H/I: Pt denies

A/VH: Pt denies 

Sleep: See sleep assessment 

ADL's: Independent

Group attendance: No

Were meds taken: Yes

Any med S/E: none 



Mental Status Exam



Appearance: Tall, large man with short brown hair dressed in personal clothing 

Eye contact: Fair to good.

Behavior: Pleasant, cooperative, paces, listens to radio headphones.

Speech: Clear, audible.

Mood: "anxious." 

Affect: Blunted 

Thought process: Linear

Thought Content: Patient wants to have a B2 shot to fix unsettled feelings

Cognition: A&O X4

Insight: Poor

Judgment: Poor



Interventions

PRN's used: Trazodone  



Therapeutic interventions: 1:1 assessment, establishment of rapport, therapeutic 
communication, active listening, ensured contract for safety, provided clear and simple 
instructions, medication administration/education/monitoring, encouragement to attend 
groups, behavior monitoring and intervention as needed; distraction, redirection, coping 
skills education,  positive reinforcement, and maintained Q 15 minute safety checks. 



Restraints/seclusion/emergency medication: None



Justification of Continued Inpatient Treatment: Pt in need of crisis interruption and 
medication adjustments and monitoring in a safe and therapeutic environment until stable.

## 2022-03-18 NOTE — NUR
Nursing Progress Note:



Legal hold: 5250



Client on involuntary status for DTS/GD



Report received from Ruthie MORALES with use of SBAR



Why they are here:  

Pt admitted from [facility] in [place]. Pt. appeared to be experiencing EPS upon arrival to 
unit, client appeared agitated, stating he was unable to control his eye movements with his 
eyes fixed to the ceiling. IM Ativan and Benadryl administered with effective results. Pt. 
experiencing auditory hallucinations telling him to kill himself and he should be dead. Pt. 
placed on 5150 for DTS/GD.



Assessment

What happened this shift: Pt was up for breakfast. Pt was cooperative with his medications. 
Pt denied depression, SI/HI/AH/VH. Pt watches TV and listens to the radio headphones. Pt was 
given WHEATLEY Haldol Decanoate 50 mg IM in right ventrogluteal site at 1148 with no adverse 
effects noted.  Pt asked what the medication was used for, what it would help with and he 
was educated. ZULEMA Rodriguez plans for pt to be given another Haldol Dec. injection in 2 weeks. Pt 
attended group today. Pt did not complain of dizziness or rectal bleeding today.



S/I, H/I: Pt denies

A/VH: Pt denies 

Sleep: Pt slept 7.25 hours last night per noc shift report.

ADL's: Independent

Group attendance: Yes

Were meds taken: Yes

Any med S/E: None noted or reported.



Mental Status Exam



Appearance: Tall, large man with short brown hair and a small balding patch back of head 
dressed in clean green unit scrubs.

Eye contact: Good.

Behavior: Pleasant, cooperative, paces, listens to radio headphones, watches TV.

Speech: Clear, audible.

Mood: Calm

Affect: Blunted 

Thought process: Linear

Thought Content: Processing the idea of the Haldol Dec. injections.

Cognition: A&O X4

Insight: Poor

Judgment: Poor



Interventions



PRN's used: None



Therapeutic interventions: 1:1 assessment, establishment of rapport, therapeutic 
conversation, active listening, ensured contract for safety, provided clear and simple 
instructions, medication administration/education/monitoring, encouragement to attend 
groups, behavior monitoring and intervention as needed; distraction, redirection, coping 
skills education,  positive reinforcement, and maintained Q 15 minute safety checks. 



Restraints/seclusion/emergency medication: None



Justification of Continued Inpatient Treatment: Pt in need of crisis interruption and 
medication adjustments and monitoring in a safe and therapeutic environment until stable.

## 2022-03-19 VITALS — DIASTOLIC BLOOD PRESSURE: 76 MMHG | SYSTOLIC BLOOD PRESSURE: 135 MMHG

## 2022-03-19 VITALS — SYSTOLIC BLOOD PRESSURE: 130 MMHG | DIASTOLIC BLOOD PRESSURE: 69 MMHG

## 2022-03-19 VITALS — SYSTOLIC BLOOD PRESSURE: 135 MMHG | DIASTOLIC BLOOD PRESSURE: 76 MMHG

## 2022-03-19 RX ADMIN — DOCUSATE SODIUM SCH MG: 100 CAPSULE, LIQUID FILLED ORAL at 09:01

## 2022-03-19 RX ADMIN — DOCUSATE SODIUM SCH MG: 100 CAPSULE, LIQUID FILLED ORAL at 20:24

## 2022-03-19 RX ADMIN — BENZTROPINE MESYLATE SCH MG: 1 TABLET ORAL at 09:01

## 2022-03-19 RX ADMIN — BENZTROPINE MESYLATE SCH MG: 1 TABLET ORAL at 20:24

## 2022-03-19 RX ADMIN — NICOTINE SCH PATCH: 21 PATCH, EXTENDED RELEASE TRANSDERMAL at 09:02

## 2022-03-19 RX ADMIN — NICOTINE POLACRILEX PRN MG: 2 LOZENGE ORAL at 15:33

## 2022-03-19 RX ADMIN — DIVALPROEX SODIUM SCH MG: 250 TABLET, EXTENDED RELEASE ORAL at 20:24

## 2022-03-19 RX ADMIN — NICOTINE POLACRILEX PRN MG: 2 LOZENGE ORAL at 17:37

## 2022-03-19 NOTE — NUR
Nursing Progress Note:



Legal hold: 5250



Client on involuntary status for DTS/GD



Report received from ENIO Murphy with use of SBAR



Why they are here:  

Pt admitted from [facility] in [place]. Pt. appeared to be experiencing EPS upon arrival to 
unit, client appeared agitated, stating he was unable to control his eye movements with his 
eyes fixed to the ceiling. IM Ativan and Benadryl administered with effective results. Pt. 
experiencing auditory hallucinations telling him to kill himself and he should be dead. Pt. 
placed on 5150 for DTS/GD.



Assessment

What happened this shift: Pt approached this nurse at shift change and asked for an IB 
Profen for his legs. Pt stated they were hurting and he didn't know why. Pt asked when med 
pass was. Pt sat in his room for most of the evening making phone calls. Pt asked questions 
about staying with his mom when he gets out of here. I explained to the pt that he needed to 
talk to the  about his D/C and what he can do. Pt paced in hallway. Pt ate 
snack at snack time. Pt took medications w/o complications. Pt continued to pace after med 
pass. Pt took Trazodone 50mg and 100mg Hydroxyzine for anxiety. Pt went t [facility] shortly 
after. 



S/I, H/I: Pt denies

A/VH: Pt denies 

Sleep: See sleep hours

ADL's: Independent

Group attendance: No groups in the evening

Were meds taken: Yes

Any med S/E: Pt was excessively sleepy this morning, he was slurring his words and c/o 
dizziness when he stands up.



Mental Status Exam



Appearance: Tall, large man with short brown hair and a small balding patch back of head 
dressed in black sweat pants and a black and white plaid flannel shirt.

Eye contact: Good.

Behavior: Paces, made several phone calls.

Speech: good

Mood: Anxious, depressed.

Affect: Sullen

Thought process: Perseverative

Thought Content: moving in with his mom

Cognition: A&O X4

Insight: Poor

Judgment: Poor



Interventions



PRN's used: IB-Profen, Trazodone 50mg, Hydroxyzine 100mg 



Therapeutic interventions: 1:1 assessment, therapeutic conversation, active listening, 
ensured contract for safety, provided clear and simple instructions, medication 
administration/education/monitoring, encouragement to attend groups, behavior monitoring and 
intervention as needed; distraction, redirection, coping skills education, positive 
reinforcement, and maintained Q 15 minute safety checks. 



Restraints/seclusion/emergency medication: None



Justification of Continued Inpatient Treatment: Pt in need of crisis interruption and 
medication adjustments and monitoring in a safe and therapeutic environment until stable.

## 2022-03-19 NOTE — NUR
Nursing Progress Note:



Legal hold: 5250



Client on involuntary status for DTS/GD



Report received from Nafisa Nova RN with use of SBAR



Why they are here:  

Pt admitted from [facility] in [place]. Pt. appeared to be experiencing EPS upon arrival to 
unit, client appeared agitated, stating he was unable to control his eye movements with his 
eyes fixed to the ceiling. IM Ativan and Benadryl administered with effective results. Pt. 
experiencing auditory hallucinations telling him to kill himself and he should be dead. Pt. 
placed on 5150 for DTS/GD.



Assessment

What happened this shift: Pt was sleepy this morning. Pt declined to get up for breakfast. 
Pt did get up for morning snack. Pt was cooperative with his medications. Pt's speech was 
less clear today, he was frequently slurring his words. Pt is on routine Cogentin 2 mg. Pt 
c/o some dizziness when he stands up. Encouraged fluids and advised pt to use care when 
changing position. Pt received his first Haldol Dec injection yesterday. 



Pt denied AH, pt appears depressed and anxious. Pt states that "I am so stressed out." Pt is 
perseverating on what will happen to him when he is discharged. Pt does not wish to return 
to live with his brother. Pt states that his brother lives in a very small trailer and they 
have to share a bed. Pt states that he has an income of $1000 per month but he has to beg 
cigarettes from his brother. His brother is not his payee though pt states he would prefer 
if he were. Pt asks about conservatorship repeatedly. Pt seems to think that there are 4 
year long conservatorships. Pt approaches this RN in the charting room frequently to voice 
his concerns. Pt states that he called his brother and "he just keeps telling me he's tired 
of this shit, to man up and just keep taking my pills...I'm just gonna go on 
conservatorship, Amy said she would do it. I'm just gonna go on conservatorship and get it 
over with." Determined that pt had called his brother at least 4 times today. This RN 
suggested this may be too many times in one day and he probably should not call again today.



Pt just approached this nurse to report that he spoke with his mom and she is calling his 
niece who has a garage converted into a room to see if maybe he could rent it from her. 
Either that or he may be able to go and stay with another brother who has a bigger place and 
again pay some rent. Offered encouragement and positive reinforcement that pt was taking the 
initiative to explore his options.



S/I, H/I: Pt denies

A/VH: Pt denies 

Sleep: Pt slept 8.75 hours last night per noc shift report, pt slept in late this morning 
until just before 1100 snack.

ADL's: Independent

Group attendance: No groups today.

Were meds taken: Yes

Any med S/E: Pt was excessively sleepy this morning, he was slurring his words and c/o 
dizziness when he stands up.



Mental Status Exam



Appearance: Tall, large man with short brown hair and a small balding patch back of head 
dressed in black sweat pants and a black and white plaid flannel shirt.

Eye contact: Good.

Behavior: Paces, frequently approaches this nurse to vent today, made several phone calls.

Speech: Slurred today.

Mood: Anxious, depressed.

Affect: Sullen

Thought process: Perseverative

Thought Content: He does not want to return to live with his brother in a trailer.

Cognition: A&O X4

Insight: Poor

Judgment: Poor



Interventions



PRN's used: Nicotine lozenges.



Therapeutic interventions: 1:1 assessment, therapeutic conversation, active listening, 
ensured contract for safety, provided clear and simple instructions, medication 
administration/education/monitoring, encouragement to attend groups, behavior monitoring and 
intervention as needed; distraction, redirection, coping skills education, positive 
reinforcement, and maintained Q 15 minute safety checks. 



Restraints/seclusion/emergency medication: None



Justification of Continued Inpatient Treatment: Pt in need of crisis interruption and 
medication adjustments and monitoring in a safe and therapeutic environment until stable.

## 2022-03-19 NOTE — NUR
Nursing Progress Note:



Legal hold: 5250



Client on involuntary status for DTS/GD



Report received from Jeffrey STEEN with use of SBAR



Why they are here:  

Pt admitted from [facility] in [place]. Pt. appeared to be experiencing EPS upon arrival to 
unit, client appeared agitated, stating he was unable to control his eye movements with his 
eyes fixed to the ceiling. IM Ativan and Benadryl administered with effective results. Pt. 
experiencing auditory hallucinations telling him to kill himself and he should be dead. Pt. 
placed on 5150 for DTS/GD.



Assessment

What happened this shift: Patient was found standing in hallway at beginning of shift. 
Patient took a shower before participating in snack. Patient took all night medications 
including prn trazodone. Patient was given prn ibuprofen to help with foot pain. Patient 
took all medications without issue and went to bed.   





S/I, H/I: Pt denies

A/VH: Pt denies 

Sleep: See sleep assessment 

ADL's: Independent

Group attendance: Yes

Were meds taken: Yes

Any med S/E: None noted or reported.



Mental Status Exam



Appearance: Tall, large man dressed in clean green unit scrubs.

Eye contact: Good.

Behavior: Pleasant, cooperative, paces, listens to radio headphones, watches TV.

Speech: Clear, audible.

Mood: Calm

Affect: Blunted 

Thought process: Linear

Thought Content: Processing the idea of the Haldol Dec. injections.

Cognition: A&O X4

Insight: Poor

Judgment: Poor



Interventions



PRN's used: None



Therapeutic interventions: 1:1 assessment, establishment of rapport, therapeutic 
conversation, active listening, ensured contract for safety, provided clear and simple 
instructions, medication administration/education/monitoring, encouragement to attend 
groups, behavior monitoring and intervention as needed; distraction, redirection, coping 
skills education,  positive reinforcement, and maintained Q 15 minute safety checks. 



Restraints/seclusion/emergency medication: None



Justification of Continued Inpatient Treatment: Pt in need of crisis interruption and 
medication adjustments and monitoring in a safe and therapeutic environment until stable.

## 2022-03-20 VITALS — DIASTOLIC BLOOD PRESSURE: 70 MMHG | SYSTOLIC BLOOD PRESSURE: 104 MMHG

## 2022-03-20 VITALS — SYSTOLIC BLOOD PRESSURE: 130 MMHG | DIASTOLIC BLOOD PRESSURE: 85 MMHG

## 2022-03-20 VITALS — DIASTOLIC BLOOD PRESSURE: 85 MMHG | SYSTOLIC BLOOD PRESSURE: 130 MMHG

## 2022-03-20 RX ADMIN — DOCUSATE SODIUM SCH MG: 100 CAPSULE, LIQUID FILLED ORAL at 20:00

## 2022-03-20 RX ADMIN — BENZTROPINE MESYLATE SCH MG: 1 TABLET ORAL at 07:41

## 2022-03-20 RX ADMIN — NICOTINE SCH PATCH: 21 PATCH, EXTENDED RELEASE TRANSDERMAL at 07:45

## 2022-03-20 RX ADMIN — DOCUSATE SODIUM SCH MG: 100 CAPSULE, LIQUID FILLED ORAL at 07:42

## 2022-03-20 RX ADMIN — NICOTINE POLACRILEX PRN MG: 2 LOZENGE ORAL at 12:11

## 2022-03-20 RX ADMIN — NICOTINE POLACRILEX PRN MG: 2 LOZENGE ORAL at 17:38

## 2022-03-20 RX ADMIN — DIVALPROEX SODIUM SCH MG: 250 TABLET, EXTENDED RELEASE ORAL at 20:00

## 2022-03-20 RX ADMIN — BENZTROPINE MESYLATE SCH MG: 1 TABLET ORAL at 20:00

## 2022-03-20 NOTE — NUR
Nursing Progress Note: Mike ROBLES 



Legal hold: 5250



Client on involuntary status for DTS/GD



Report received from CRN with use of SBAR



Why they are here:  

Pt admitted from [facility] in [place]. Pt. appeared to be experiencing EPS upon arrival to 
unit, client appeared agitated, stating he was unable to control his eye movements with his 
eyes fixed to the ceiling. IM Ativan and Benadryl administered with effective results. Pt. 
experiencing auditory hallucinations telling him to kill himself and he should be dead. Pt. 
placed on 5150 for DTS/GD.



Assessment

What happened this shift:



Pt. received sleeping, woke to receive his medication, and 1:1 assessment. Pt. reported he 
was admitted due to suicidal thoughts but refused current SI. He plans to discharge to 
his Moms, and she will be my payee He presented as calmer with increased mood 
stabilization today compared to my previous encounters with him. Pt. reported my increased 
mg. has helped Pt. attend all meals with cohorts and was observed socially engaging with 
others occasionally. Pt. napped intermittently throughout the morning, and later requested 
PRN nicotine lozenges. He was observed often OOB walking around on the phone.



S/I, H/I: Denies both

A/VH: Denies both 

Sleep: Intermittent naps throughout the morning.

ADL's: Independent

Group attendance: NA

Were meds taken: Yes

Any med S/E: None noted or reported.



Mental Status Exam



Appearance: Tall, young male, dressed in scrub pants and flannel.

Eye contact: Good.

Behavior: Calm, cooperative

Speech: Clear

Mood: Depressed

Affect: Sullen

Thought process: Perseverative

Thought Content: Wants to stay with his Mom

Cognition: A&O X4

Insight: Poor

Judgment: Poor



Interventions



PRN's used: Nicotine lozenges



Therapeutic interventions: 1:1 assessment, therapeutic conversation, active listening, 
ensured contract for safety, provided clear and simple instructions, medication 
administration/education/monitoring, encouragement to attend groups, behavior monitoring and 
intervention as needed; distraction, redirection, coping skills education, positive 
reinforcement, and maintained Q 15 minute safety checks. 



Restraints/seclusion/emergency medication: None



Justification of Continued Inpatient Treatment: Pt in need of crisis interruption and 
medication adjustments and monitoring in a safe and therapeutic environment until stable.

## 2022-03-20 NOTE — NUR
Nursing Progress Note: Mike ROBLES 



Legal hold: 5253



Client on involuntary status for DTS/GD



Report received from AGNES Murphy with use of SBAR



Why they are here:  

Pt admitted from [facility] in [place]. Pt. appeared to be experiencing EPS upon arrival to 
unit, client appeared agitated, stating he was unable to control his eye movements with his 
eyes fixed to the ceiling. IM Ativan and Benadryl administered with effective results. Pt. 
experiencing auditory hallucinations telling him to kill himself and he should be dead. Pt. 
placed on 5150 for DTS/GD.



Assessment

What happened this shift:



Pt on phone at shift change. Pt came up to this nurse and reiterated that his brother had 
been speaking to his SW and had been told that they  can conserve him for three years. Pt 
started becoming agitated and taking the phone to his room and calling his mother and 
brother who supposedly was telling the patient that he would be getting conserved. It was 
brought up to the patient that he probably should refrain from using the phone to talk to 
family about his DC as they seem to be aggravating the patient and not helping at that 
moment. Pt was given Haldol 5mg for agitation at that moment. Pt was calm until Michael POOLE 
walked in and patient approached him quickly to talk about what his family had been telling 
him. Michael calmed him down as I was getting his evening medications together to help calm him 
further. Pt began yelling in hallways and threatening his SW. Patient then screamed 
profanities. Michael POOLE was in the nurses station and ordered a B52 to be given to patient. 
B52 given w/o complications from the patient. Patient the paced up and down the hallway.Per 
Michael POOLE Klonopin was held.  When offering pt his evening meds the patient lashed out, "I'm 
not taking anymore stupid pills, I don't even want to be here." Pt continued to pace for an 
hour. Patient approached this nurse around 2100 and apologized and reported that he did not 
want to hurt himself or anyone else. He stated that he was frustrated at his family and 
wanting to go home. Patient then took evening meds w/o complications. Came out at 2230 and 
wanted another Trazodone, and Hydroxyzine. 

S/I, H/I: Denies both

A/VH: Denies both 

Sleep: See sleep hours

ADL's: Independent

Group attendance: No group in the evenings

Were meds taken: Yes

Any med S/E: None noted or reported.



Mental Status Exam



Appearance: Tall, young male, dressed in scrub pants and flannel.

Eye contact: Good.

Behavior: agitated, 

Speech: Clear

Mood: aggravated, upset

Affect: sad

Thought process: Perseverative

Thought Content: Wants to stay with his Mom, reports that him and his brother do not get 
along

Cognition: A&O X4

Insight: Poor

Judgment: Poor



Interventions



PRN's used: Nicotine lozenges, 650mg IB-Profen, Haldol 5mg, Trazodone 50mg X2, 2mg Ativan 
IM, 10mg Haldol IM, 50mg Benadryl IM. 



Therapeutic interventions: 1:1 assessment, therapeutic conversation, active listening, 
ensured contract for safety, provided clear and simple instructions, medication 
administration/education/monitoring, encouragement to attend groups, behavior monitoring and 
intervention as needed; distraction, redirection, coping skills education, positive 
reinforcement, and maintained Q 15 minute safety checks. 



Restraints/seclusion/emergency medication: None



Justification of Continued Inpatient Treatment: Pt in need of crisis interruption and 
medication adjustments and monitoring in a safe and therapeutic environment until stable.

## 2022-03-21 VITALS — SYSTOLIC BLOOD PRESSURE: 127 MMHG | DIASTOLIC BLOOD PRESSURE: 69 MMHG

## 2022-03-21 VITALS — DIASTOLIC BLOOD PRESSURE: 54 MMHG | SYSTOLIC BLOOD PRESSURE: 117 MMHG

## 2022-03-21 RX ADMIN — DOCUSATE SODIUM SCH MG: 100 CAPSULE, LIQUID FILLED ORAL at 20:26

## 2022-03-21 RX ADMIN — BENZTROPINE MESYLATE SCH MG: 1 TABLET ORAL at 20:25

## 2022-03-21 RX ADMIN — NICOTINE SCH PATCH: 21 PATCH, EXTENDED RELEASE TRANSDERMAL at 07:19

## 2022-03-21 RX ADMIN — DOCUSATE SODIUM SCH MG: 100 CAPSULE, LIQUID FILLED ORAL at 07:21

## 2022-03-21 RX ADMIN — DIVALPROEX SODIUM SCH MG: 250 TABLET, EXTENDED RELEASE ORAL at 20:25

## 2022-03-21 RX ADMIN — BENZTROPINE MESYLATE SCH MG: 1 TABLET ORAL at 07:19

## 2022-03-21 RX ADMIN — NICOTINE POLACRILEX PRN MG: 2 LOZENGE ORAL at 17:36

## 2022-03-21 NOTE — NUR
Nursing Progress Note: Mike ROBLES 



Legal hold: 5250



Client on involuntary status for DTS/GD



Report received from CRN with use of SBAR



Why they are here:  

Pt admitted from [facility] in [place]. Pt. appeared to be experiencing EPS upon arrival to 
unit, client appeared agitated, stating he was unable to control his eye movements with his 
eyes fixed to the ceiling. IM Ativan and Benadryl administered with effective results. Pt. 
experiencing auditory hallucinations telling him to kill himself and he should be dead. Pt. 
placed on 5150 for DTS/GD.



Assessment

What happened this shift:



Pt was stating that he would like to get a hold of his mom about his living arrangements at 
change of shift. Pt is concerned that he gave his mom the wrong information and told her he 
would be conserved for 3 years. Pt states he told her this because he heard it from his 
brother. Pt states "I think the doctor is going to vouch for me and then maybe I can live 
with my mom, I dont think its a good idea to live with my brother." Pt spent time trying to 
call his mom and pacing anxiously in the singh. Pt took HS meds after having a snack and went 
to bed. 



S/I, H/I: Denies both

A/VH: Denies both 

Sleep: Intermittent naps throughout the morning.

ADL's: Independent

Group attendance: No

Were meds taken: Yes

Any med S/E: None noted or reported.



Mental Status Exam



Appearance: Tall, young male, dressed in scrub pants and flannel.

Eye contact: Good.

Behavior: Cooperative

Speech: Clear

Mood: Depressed

Affect: Sullen

Thought process: Obsessive

Thought Content: Wants to go stay with his Mom

Cognition: A&O X4

Insight: Poor

Judgment: Poor



Interventions



PRN's used: none



Therapeutic interventions: 1:1 assessment, therapeutic conversation, active listening, 
ensured contract for safety, provided clear and simple instructions, medication 
administration/education/monitoring, encouragement to attend groups, behavior monitoring and 
intervention as needed; distraction, redirection, coping skills education, positive 
reinforcement, and maintained Q 15 minute safety checks. 



Restraints/seclusion/emergency medication: None



Justification of Continued Inpatient Treatment: Pt in need of crisis interruption and 
medication adjustments and monitoring in a safe and therapeutic environment until stable

## 2022-03-21 NOTE — NUR
Reassessment: Noted pt change to carb controlled diet 3/16 and continues to

eat well with mostly 100% PO intake of all meals. TC to RN who states pt 

requested this diet to help with wt control; no hx of DM noted in EMR and 

A1c 5.5. Pt also noted to be consuming snacks. Overall pt meeting estimated

protein needs and likely close to meeting est energy needs w/ snacks. LBM 

3/20. No nutrition intervention implemented at this time. Will continue to 

follow.

Recommendations:

1) Continue Carb controlled diet per pt preference; consider changing back to regular to 
better meet pt's nutritional needs

2) Monitor need for additional protein for satiety; offer snacks

3) Bowel care PRN

4) Weekly scaled weights

-------------------------------------------------------------------------------

Addendum: 03/21/22 at 0722 by Loc Vill[address]ba RD

-------------------------------------------------------------------------------

Amended: Links added.

## 2022-03-21 NOTE — NUR
Nursing Progress Note: Mike ROBLES 



Legal hold: [id number]



Client on involuntary status for DTS/GD



Report received from CRN with use of SBAR



Why they are here:  

Pt admitted from [facility] in [place]. Pt. appeared to be experiencing EPS upon arrival to 
unit, client appeared agitated, stating he was unable to control his eye movements with his 
eyes fixed to the ceiling. IM Ativan and Benadryl administered with effective results. Pt. 
experiencing auditory hallucinations telling him to kill himself and he should be dead. Pt. 
placed on 5150 for DTS/GD.



Assessment

What happened this shift:



Pt. received sleeping, woke to receive his medication, and reported feeling too tired to 
get up for breakfast Later 1:1 assessment completed. Pt. reported feeling tired, because I 
had a rough night Pt. ate lunch and immediately returned to his room. Later pt. approached 
writer repeating himself numerous times stating I cant go to my brothers, you know its 
not a good idea, I want to go to my Moms I encouraged pt. discuss the arrangement with 
, and provider. Pt. denied needing anything for anxiety, he paced the unit 
for a short while and used the phone numerous times. Pt. approached writer c/o HA, and was 
given PRN Tylenol. At 1530 pt. approached writer c/o allot of anxiety he stated  Im so 
stressed out right now my Moms mad at me and wont answer the phone. I encouraged pt. to 
utilize stress management techniques and take a break from the phone, PRN Atarax given.



S/I, H/I: Denies both

A/VH: Denies both 

Sleep: Intermittent naps throughout the morning.

ADL's: Independent

Group attendance: No

Were meds taken: Yes

Any med S/E: None noted or reported.



Mental Status Exam



Appearance: Tall, young male, dressed in scrub pants and flannel.

Eye contact: Good.

Behavior: Cooperative

Speech: Clear

Mood: Depressed

Affect: Sullen

Thought process: Obsessive

Thought Content: Wants to go stay with his Mom

Cognition: A&O X4

Insight: Poor

Judgment: Poor



Interventions



PRN's used: Atarax, Tylenol



Therapeutic interventions: 1:1 assessment, therapeutic conversation, active listening, 
ensured contract for safety, provided clear and simple instructions, medication 
administration/education/monitoring, encouragement to attend groups, behavior monitoring and 
intervention as needed; distraction, redirection, coping skills education, positive 
reinforcement, and maintained Q 15 minute safety checks. 



Restraints/seclusion/emergency medication: None



Justification of Continued Inpatient Treatment: Pt in need of crisis interruption and 
medication adjustments and monitoring in a safe and therapeutic environment until stable

## 2022-03-22 VITALS — DIASTOLIC BLOOD PRESSURE: 52 MMHG | SYSTOLIC BLOOD PRESSURE: 101 MMHG

## 2022-03-22 VITALS — DIASTOLIC BLOOD PRESSURE: 67 MMHG | SYSTOLIC BLOOD PRESSURE: 116 MMHG

## 2022-03-22 RX ADMIN — DOCUSATE SODIUM SCH MG: 100 CAPSULE, LIQUID FILLED ORAL at 07:36

## 2022-03-22 RX ADMIN — BENZTROPINE MESYLATE SCH MG: 1 TABLET ORAL at 20:03

## 2022-03-22 RX ADMIN — BENZTROPINE MESYLATE SCH MG: 1 TABLET ORAL at 07:36

## 2022-03-22 RX ADMIN — DOCUSATE SODIUM SCH MG: 100 CAPSULE, LIQUID FILLED ORAL at 20:03

## 2022-03-22 RX ADMIN — NICOTINE POLACRILEX PRN MG: 2 LOZENGE ORAL at 15:37

## 2022-03-22 RX ADMIN — DIVALPROEX SODIUM SCH MG: 250 TABLET, EXTENDED RELEASE ORAL at 20:03

## 2022-03-22 RX ADMIN — NICOTINE SCH PATCH: 21 PATCH, EXTENDED RELEASE TRANSDERMAL at 07:45

## 2022-03-22 RX ADMIN — NICOTINE SCH PATCH: 21 PATCH, EXTENDED RELEASE TRANSDERMAL at 07:41

## 2022-03-22 NOTE — NUR
DISCHARGE PLANNING



[facility] Clinician, Chiki Sims, came onto the unit to meet with John Paul Mckeon PA, 
and writer to discuss discharge plan. Chiki gave Mike the option of returning to his 
brother's home in [place] [facility] He got upset when Chiki informed 
him he cannot go to his mother's trailer in [place] that does not have electricity or running 
water. Mike got up and left the meeting. He came back shortly later and apologized. 
Chiki called Mike's brother, Dae, on speaker phone. Dae made it clear what his 
expectations are for Mike to return there (abstain from drugs, alcohol, caffeine, take 
meds, and stay away from certain people that use drugs). Mike agreed to this and stated 
[facility] 



Called Erasto (ph# [phone]) with [facility] to coordinate transport and follow up 
appointments. Erasto reported Mike needs to change his Medi-tanner to [place] since 
Mike will be living in [place]. He was hesitant to schedule follow up appointment. 
Informed him that Mike will not be able to switch his Medi-tanner in time to get his next 
Haldol Dec injection which will be due on 4/1/22. Called Erasto back with the address of 
[facility] and had to leave a message. Requested a call back to confirm transportation 
and follow up appointment. 



SIDDHARTHA Salamanca

## 2022-03-22 NOTE — NUR
Nursing Progress Note: Mike ROBLES 



Legal hold: 5250



Client on involuntary status for DTS/GD



Report received from CRN with use of SBAR



Why they are here:  

Pt admitted from [facility] in [place]. Pt. appeared to be experiencing EPS upon arrival to 
unit, client appeared agitated, stating he was unable to control his eye movements with his 
eyes fixed to the ceiling. IM Ativan and Benadryl administered with effective results. Pt. 
experiencing auditory hallucinations telling him to kill himself and he should be dead. Pt. 
placed on 5150 for DTS/GD.



Assessment

What happened this shift: Patient was asleep at change of shift and up for breakfast.  
Patient's movement and speech are slow.  Sometimes it is difficult to understand him.  
Patient states he in anxious because he can't live with his mother and states the door will 
be locked if he goes to [place].  Patient is anxious about the discharge plan.  RN 
gave patient Atarax for anxiety.  Patient had a meeting with Silvana and other non-staff 
personnel to discuss the plan.  This is what made patient so anxious.  Patient is 
perseverating on his discharge plan. Patient denies suicidal/homicidal ideation.  Patient 
denies audio/visual hallucinations.  







S/I, H/I: Denies

A/VH: Denies

Sleep: Intermittent naps

ADL's: Independent

Group attendance: No

Were meds taken: Yes

Any med S/E: None noted or reported.



Mental Status Exam



Appearance: Tall, young male, dressed in scrub pants and flannel.

Eye contact: Good.

Behavior: Cooperative

Speech: Slow and slurred at times

Mood: Depressed

Affect: Flat

Thought process: Obsessive

Thought Content: Wants to go stay with his Mom

Cognition: A&O X4

Insight: Poor

Judgment: Poor



Interventions



PRN's used: Atarax, Tylenol



Therapeutic interventions: 1:1 assessment, therapeutic conversation, active listening, 
ensured contract for safety, provided clear and simple instructions, medication 
administration/education/monitoring, encouragement to attend groups, behavior monitoring and 
intervention as needed; distraction, redirection, coping skills education, positive 
reinforcement, and maintained Q 15 minute safety checks. 



Restraints/seclusion/emergency medication: None



Justification of Continued Inpatient Treatment: Pt in need of crisis interruption and 
medication adjustments and monitoring in a safe and therapeutic environment until stable

## 2022-03-23 VITALS — SYSTOLIC BLOOD PRESSURE: 125 MMHG | DIASTOLIC BLOOD PRESSURE: 60 MMHG

## 2022-03-23 VITALS — DIASTOLIC BLOOD PRESSURE: 74 MMHG | SYSTOLIC BLOOD PRESSURE: 155 MMHG

## 2022-03-23 RX ADMIN — DOCUSATE SODIUM SCH MG: 100 CAPSULE, LIQUID FILLED ORAL at 20:08

## 2022-03-23 RX ADMIN — NICOTINE POLACRILEX PRN MG: 2 LOZENGE ORAL at 10:10

## 2022-03-23 RX ADMIN — DIVALPROEX SODIUM SCH MG: 250 TABLET, EXTENDED RELEASE ORAL at 20:08

## 2022-03-23 RX ADMIN — BENZTROPINE MESYLATE SCH MG: 1 TABLET ORAL at 20:09

## 2022-03-23 RX ADMIN — BENZTROPINE MESYLATE SCH MG: 1 TABLET ORAL at 07:57

## 2022-03-23 RX ADMIN — NICOTINE SCH PATCH: 21 PATCH, EXTENDED RELEASE TRANSDERMAL at 08:00

## 2022-03-23 RX ADMIN — DOCUSATE SODIUM SCH MG: 100 CAPSULE, LIQUID FILLED ORAL at 07:58

## 2022-03-23 NOTE — NUR
Nursing Progress Note:  



Legal hold: 5250



Client on involuntary status for DTS/GD



Report received from NOC Charge, RN with use of SBAR



Why they are here:  

Pt admitted from [facility] in [place]. Pt. appeared to be experiencing EPS upon arrival to 
unit, client appeared agitated, stating he was unable to control his eye movements with his 
eyes fixed to the ceiling. IM Ativan and Benadryl administered with effective results. Pt. 
experiencing auditory hallucinations telling him to kill himself and he should be dead. Pt. 
placed on 5150 for DTS/GD.



Assessment



What happened this shift: Pt was walking in the hallway and did some push ups in his room. 
He isn't looking forward to going to stay with his brother, talks about his brother 
"starting trouble and eating all the food." Pt is somewhat anxious tonight and asks to have 
"all the sleep meds I can get." Pt c/o not sleeping well the previous night. Pt was given 
prn trazodone. 

S/I, H/I: Denies 

A/VH: Denies  

Sleep: See sleep assessment.

ADL's: Independent

Group attendance: No

Were meds taken: Yes

Any med S/E: None noted or reported.



Mental Status Exam



Appearance: Tall, young male, dressed in scrub pants and flannel.

Eye contact: Good.

Behavior: Cooperative

Speech: Clear

Mood: anxious

Affect: constricted 

Thought process: Obsessive

Thought Content: When is he going to be discharged.

Cognition: A&O X4

Insight: Poor

Judgment: Poor



Interventions



PRN's used: none



Therapeutic interventions: 1:1 assessment, therapeutic conversation, active listening, 
ensured contract for safety, provided clear and simple instructions, medication 
administration/education/monitoring, behavior monitoring and intervention as needed; 
distraction, redirection, coping skills education, positive reinforcement, and maintained Q 
15 minute safety checks. 



Restraints/seclusion/emergency medication: None



Justification of Continued Inpatient Treatment: Pt in need of crisis interruption and 
medication adjustments and monitoring in a safe and therapeutic environment until stable

## 2022-03-23 NOTE — NUR
Nursing Progress Note:  



Legal hold: 5250



Client on involuntary status for DTS/GD



Report received from NOC Charge, RN with use of SBAR



Why they are here:  

Pt admitted from [facility] in [place]. Pt. appeared to be experiencing EPS upon arrival to 
unit, client appeared agitated, stating he was unable to control his eye movements with his 
eyes fixed to the ceiling. IM Ativan and Benadryl administered with effective results. Pt. 
experiencing auditory hallucinations telling him to kill himself and he should be dead. Pt. 
placed on 5150 for DTS/GD.



Assessment



What happened this shift: Patient was in the hallway at shift change. Patient asking when he 
will be discharged, I stated that he would have to comply with the rules his brother may 
imply if he were to move in with him, per  notes 3/22/22.  As of the date he will be 
discharged, I was unaware and expressed that to him.   Patient denies all MH symptoms, but 
appears a solemn and anxious over his living situation. Patient has taken all his 
medications today, and is actively walking the halls and watching tv. 



S/I, H/I: Denies 

A/VH: Denies  

Sleep: See sleep assessment.

ADL's: Independent

Group attendance: No

Were meds taken: Yes

Any med S/E: None noted or reported.



Mental Status Exam



Appearance: Tall, young male, dressed in scrub pants and flannel.

Eye contact: Good.

Behavior: Cooperative

Speech: Clear

Mood: anxious

Affect: Sullen

Thought process: Obsessive

Thought Content: When is he going to be discharged.

Cognition: A&O X4

Insight: Poor

Judgment: Poor



Interventions



PRN's used: Ibuprophen



Therapeutic interventions: 1:1 assessment, therapeutic conversation, active listening, 
ensured contract for safety, provided clear and simple instructions, medication 
administration/education/monitoring, behavior monitoring and intervention as needed; 
distraction, redirection, coping skills education, positive reinforcement, and maintained Q 
15 minute safety checks. 



Restraints/seclusion/emergency medication: None



Justification of Continued Inpatient Treatment: Pt in need of crisis interruption and 
medication adjustments and monitoring in a safe and therapeutic environment until stable

## 2022-03-23 NOTE — NUR
Nursing Progress Note:  



Legal hold: 5250



Client on involuntary status for DTS/GD



Report received from ENIO Murphy with use of SBAR



Why they are here:  

Pt admitted from [facility] in [place]. Pt. appeared to be experiencing EPS upon arrival to 
unit, client appeared agitated, stating he was unable to control his eye movements with his 
eyes fixed to the ceiling. IM Ativan and Benadryl administered with effective results. Pt. 
experiencing auditory hallucinations telling him to kill himself and he should be dead. Pt. 
placed on 5150 for DTS/GD.



Assessment



What happened this shift: Patient was in the hallway at shift change. He appears fatigued 
and moves slowly. Patient states he doesn't yet know where he will end up living, but he 
believes his best option will be his mother, if she allows it. Patient denies all MH 
symptoms, but appears a little depressed and anxious over his living situation. He states 
that he doesn't want to be conserved again, so he'll do everything to make that not happen. 
Patient went to snack and received his HS meds there.



S/I, H/I: Denies 

A/VH: Denies  

Sleep: See sleep assessment.

ADL's: Independent

Group attendance: No

Were meds taken: Yes

Any med S/E: None noted or reported.



Mental Status Exam



Appearance: Tall, young male, dressed in scrub pants and flannel.

Eye contact: Good.

Behavior: Cooperative

Speech: Clear

Mood: Depressed

Affect: Sullen

Thought process: Obsessive

Thought Content: Wants to go stay with his Mom

Cognition: A&O X4

Insight: Poor

Judgment: Poor



Interventions



PRN's used: none



Therapeutic interventions: 1:1 assessment, therapeutic conversation, active listening, 
ensured contract for safety, provided clear and simple instructions, medication 
administration/education/monitoring, encouragement to attend groups, behavior monitoring and 
intervention as needed; distraction, redirection, coping skills education, positive 
reinforcement, and maintained Q 15 minute safety checks. 



Restraints/seclusion/emergency medication: None



Justification of Continued Inpatient Treatment: Pt in need of crisis interruption and 
medication adjustments and monitoring in a safe and therapeutic environment until stable

## 2022-03-23 NOTE — NUR
DISCHARGE TOMORROW-3/24/22



[facility] called to report that transportation fell through for today and they will be 
picking [place] up tomorrow instead. They will call back with an BROOKE.



SIDDHARTHA Salamanca

## 2022-03-24 VITALS — DIASTOLIC BLOOD PRESSURE: 79 MMHG | SYSTOLIC BLOOD PRESSURE: 134 MMHG

## 2022-03-24 RX ADMIN — BENZTROPINE MESYLATE SCH MG: 1 TABLET ORAL at 08:04

## 2022-03-24 RX ADMIN — NICOTINE SCH PATCH: 21 PATCH, EXTENDED RELEASE TRANSDERMAL at 08:00

## 2022-03-24 RX ADMIN — DOCUSATE SODIUM SCH MG: 100 CAPSULE, LIQUID FILLED ORAL at 08:04

## 2022-03-24 NOTE — NUR
Pt refused to have pictures retaken as feet are all healed.

-------------------------------------------------------------------------------

Addendum: 03/24/22 at 1058 by Elgin Cool RN

-------------------------------------------------------------------------------

Amended: Links added.

## 2022-03-24 NOTE — NUR
Nursing Discharge Note

Pt was discharged from [facility] at 1245 and [facility] up by [facility]   to take him back 
home. Pt denies SI and in euthymic mood, looking forward to discharge and future plans. Pt 
in no acute physical or emotional distress and has been improving since admission. Pt's 
valuables and belongings inventoried and returned to him. Pt understands discharge plan and 
instructions and did not want nicotine replacement.

## 2022-09-20 ENCOUNTER — HOSPITAL ENCOUNTER (INPATIENT)
Dept: HOSPITAL 94 - ADULT MH | Age: 37
LOS: 158 days | Discharge: PSYCHIATRIC OR SUBSTANCE USE FACILITY | DRG: 750 | End: 2023-02-25
Attending: PSYCHIATRY & NEUROLOGY | Admitting: PSYCHIATRY & NEUROLOGY
Payer: MEDICAID

## 2022-09-20 VITALS — BODY MASS INDEX: 30.09 KG/M2 | HEIGHT: 76 IN | WEIGHT: 247.14 LBS

## 2022-09-20 DIAGNOSIS — F25.9: Primary | ICD-10-CM

## 2022-09-20 DIAGNOSIS — Z71.6: ICD-10-CM

## 2022-09-20 DIAGNOSIS — R06.83: ICD-10-CM

## 2022-09-20 DIAGNOSIS — R07.89: ICD-10-CM

## 2022-09-20 DIAGNOSIS — K04.7: ICD-10-CM
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
## 2022-09-20 DIAGNOSIS — F15.10: ICD-10-CM
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
## 2022-09-20 DIAGNOSIS — K59.00: ICD-10-CM
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
## 2022-09-20 DIAGNOSIS — F17.290: ICD-10-CM

## 2022-09-20 DIAGNOSIS — F41.9: ICD-10-CM

## 2022-09-20 DIAGNOSIS — E72.20: ICD-10-CM

## 2022-09-20 DIAGNOSIS — Z79.899: ICD-10-CM

## 2022-09-20 DIAGNOSIS — E87.1: ICD-10-CM

## 2022-09-20 DIAGNOSIS — Z88.5: ICD-10-CM

## 2022-09-20 DIAGNOSIS — S00.12XA: ICD-10-CM

## 2022-09-20 DIAGNOSIS — F11.23: ICD-10-CM

## 2022-09-20 DIAGNOSIS — Z20.822: ICD-10-CM

## 2022-09-20 DIAGNOSIS — F10.10: ICD-10-CM

## 2022-09-20 DIAGNOSIS — G89.29: ICD-10-CM

## 2022-09-20 DIAGNOSIS — M21.42: ICD-10-CM

## 2022-09-20 DIAGNOSIS — K92.1: ICD-10-CM

## 2022-09-20 DIAGNOSIS — J45.909: ICD-10-CM

## 2022-09-20 DIAGNOSIS — L03.032: ICD-10-CM

## 2022-09-20 DIAGNOSIS — Z56.0: ICD-10-CM
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
## 2022-09-20 PROCEDURE — 80061 LIPID PANEL: CPT

## 2022-09-20 PROCEDURE — 85025 COMPLETE CBC W/AUTO DIFF WBC: CPT

## 2022-09-20 PROCEDURE — 83540 ASSAY OF IRON: CPT

## 2022-09-20 PROCEDURE — 73610 X-RAY EXAM OF ANKLE: CPT

## 2022-09-20 PROCEDURE — 84466 ASSAY OF TRANSFERRIN: CPT

## 2022-09-20 PROCEDURE — 84443 ASSAY THYROID STIM HORMONE: CPT

## 2022-09-20 PROCEDURE — 82140 ASSAY OF AMMONIA: CPT

## 2022-09-20 PROCEDURE — 82728 ASSAY OF FERRITIN: CPT

## 2022-09-20 PROCEDURE — 80164 ASSAY DIPROPYLACETIC ACD TOT: CPT

## 2022-09-20 PROCEDURE — 80053 COMPREHEN METABOLIC PANEL: CPT

## 2022-09-20 PROCEDURE — 85045 AUTOMATED RETICULOCYTE COUNT: CPT

## 2022-09-20 PROCEDURE — 87811 SARS-COV-2 COVID19 W/OPTIC: CPT

## 2022-09-20 PROCEDURE — 87081 CULTURE SCREEN ONLY: CPT

## 2022-09-20 PROCEDURE — 83036 HEMOGLOBIN GLYCOSYLATED A1C: CPT

## 2022-09-20 PROCEDURE — 84145 PROCALCITONIN (PCT): CPT

## 2022-09-20 PROCEDURE — 93005 ELECTROCARDIOGRAM TRACING: CPT

## 2022-09-20 PROCEDURE — 70486 CT MAXILLOFACIAL W/O DYE: CPT

## 2022-09-20 PROCEDURE — 83550 IRON BINDING TEST: CPT

## 2022-09-20 PROCEDURE — 36415 COLL VENOUS BLD VENIPUNCTURE: CPT

## 2022-09-20 SDOH — ECONOMIC STABILITY - INCOME SECURITY: UNEMPLOYMENT, UNSPECIFIED: Z56.0

## 2022-09-21 VITALS — SYSTOLIC BLOOD PRESSURE: 146 MMHG | DIASTOLIC BLOOD PRESSURE: 106 MMHG

## 2022-09-21 VITALS — SYSTOLIC BLOOD PRESSURE: 115 MMHG | DIASTOLIC BLOOD PRESSURE: 69 MMHG

## 2022-09-21 RX ADMIN — BENZTROPINE MESYLATE SCH MG: 1 TABLET ORAL at 20:25

## 2022-09-21 RX ADMIN — NICOTINE POLACRILEX PRN MG: 2 LOZENGE ORAL at 14:07

## 2022-09-21 NOTE — NUR
Per 5150 pt. is here for SA by OD on streeth drugs after command hallucinations telling him 
to do so. Pt. states that he took "Fentanyl, black china, meth, 8 ball" in suicide attempt. 
Pt. states he feels hopeless. Pt. is also delusional, stating, "they won't give me my 
billion dollars". Pt. states, "I tried killing myself because Im a free renetta. They dont 
love me but I love them. Yes I do."

## 2022-09-21 NOTE — NUR
Pt. c/o of chest pain and stat ekg done that showed boderline prolonged QT elevation. Michael POOLE was notified. repeat EKG to be done in 2 days. Pt.'s VSS. endorsed to night shift.

## 2022-09-21 NOTE — NUR
ADMIT NOTE: Pt. arrived on unit on a gurney after pt. eloped from EMS and was brought back 
to the hospital by EMS after pt. walked multiple blocks on foot. Pt. was cooperative with 
skin check and admission upon arrival. Pt. smells strongly of cannabis. Pt. pacing 
aggressively back and forth in the hallway and received Umhimp1sf, Haldol 5mg, and Benadryl 
50mg po with moderate effect. Pt. then began c/o eps, stating, "I can't stop looking up". 
Pt. received cogentin 2mg IM with good effect. Later pt. again became agitated and started 
yelling and saying that Satan and 17 demons are inside of him. Pt. received 2mg Ativan PO 
with minimal effect. Pt. continued to yell and say, "I just want you to kill me!". Security 
was called and pt. received Haldol 10mg and Ativan 2mg po with good effect.

## 2022-09-22 VITALS — DIASTOLIC BLOOD PRESSURE: 62 MMHG | SYSTOLIC BLOOD PRESSURE: 126 MMHG

## 2022-09-22 VITALS — SYSTOLIC BLOOD PRESSURE: 141 MMHG | DIASTOLIC BLOOD PRESSURE: 71 MMHG

## 2022-09-22 LAB
CHOLEST SERPL-MCNC: 142 MG/DL (ref 0–200)
CHOLEST/HDLC SERPL: 2.3 {RATIO} (ref 0–4.99)
HBA1C MFR BLD: 5.1 % (ref 4.5–6.2)
HDLC SERPL-MCNC: 62 MG/DL (ref 35–60)
LDLC SERPL DIRECT ASSAY-MCNC: 65 MG/DL (ref 50–100)
TRIGL SERPL-MCNC: 70 MG/DL (ref 20–135)

## 2022-09-22 RX ADMIN — BENZTROPINE MESYLATE SCH MG: 1 TABLET ORAL at 08:06

## 2022-09-22 RX ADMIN — NICOTINE SCH PATCH: 21 PATCH, EXTENDED RELEASE TRANSDERMAL at 08:00

## 2022-09-22 RX ADMIN — BENZTROPINE MESYLATE SCH MG: 1 TABLET ORAL at 18:58

## 2022-09-22 RX ADMIN — DIVALPROEX SODIUM SCH MG: 250 TABLET, EXTENDED RELEASE ORAL at 08:06

## 2022-09-22 NOTE — NUR
Nursing Progress Note:



Problem :Pt. arrived on unit on a gurney after pt. eloped from EMS and was brought back to 
the hospital by EMS after pt. walked multiple blocks on foot. Pt. was cooperative with skin 
check and admission upon arrival. Pt. smells strongly of cannabis. Pt. pacing aggressively 
back and forth in the hallway and received [id number], Haldol 5mg, and Benadryl 50mg po with 
moderate effect. Pt. then began c/o eps, stating, "I can't stop looking up". Pt. received 
cogentin 2mg IM with good effect.



Interventions :Medication administration. Maintain a safe and supportive environment. 
Monitored behaviors and provided clear boundaries. Provided positive reinforcement, and 
maintained q15min safety checks.



Response: pt in bed & calm at shift change; informed pt had c/o of CP;  EKG done & reviewed 
first by ER doctor , then Dr Davalos; CP resolved, with no further c/o of pain or 
discomfort since shift change; stated earlier of having SI that comes & goes; denies HI or 
hearing voices; cooperative during physical assessments; took all meds; returns easily to 
sleep



Plan :Interruption of current crisis. Medication admin. Provide a safe and therapeutic 
environment.

## 2022-09-22 NOTE — NUR
[facility] CONTACT



Called Chkii Sims LCSW, with [facility] (# [phone]) to inquire if they are 
planning on conserving Mike. He reported they would like to conserve him again. Since 
his last admit to [facility] in March, he continues to flounder in the community due to multiple 
care home and PHF admissions since that time, often related to med compliance and substance use. 
He also no longer has viable d/c plans for housing due to continuing to being kicked out of 
his mothers house due to substance use and med compliance issues.  



Contact info:

Public Guardian: Linda Lord ([phone]) 

Ophelia:[facility] ([phone])-current Behavioral Health provider

Mother Soraida is [phone]



SIDDHARTHA Salamanca

## 2022-09-22 NOTE — NUR
Nursing Progress Note:



Problem: Per 5150 pt. is here for SA by OD on street drugs after command hallucinations 
telling him to do so. Pt. states that he took "Fentanyl, black china, meth, 8 ball" in 
suicide attempt. Pt. states he feels hopeless. Pt. is also delusional, stating, "they won't 
give me my billion dollars". Pt. states, "I tried killing myself because Im a free renetta. 
They dont love me but I love them. Yes I do."



Interventions: 1:1 assessment, medication administration/education/monitoring, therapeutic 
conversation, active listening, ensured contract for safety, provided distraction, 
redirection, and positive reinforcement.



Response: Pt refused his Zyprexa 10 mg this morning but did take all other PO medications. 
Pt stated that he'll take any pill but Zyprexa as he was on it before and it caused him to 
gain too much weight..."like 200 pounds." Pt also refused his nicotine patch stating he 
didn't need it today. ZULEMA Rodriguez notified of pt's Zyprexa refusal. Pt denied AH today though 
endorsed SI. Pt contracted for safety stating, "I can't do anything here, the glass is too 
thick." Pt became hyperverbal and mildly agitated. He stated, "I'm gonna kill myself when I 
get out of here, smash a window and slash a vein. Pt was given PRN Klonopin 2 mg at 0904 
with good effect. Pt paced the unit listening to the radio headphones and is calmed by cups 
of decaf coffee. 



Plan: Crisis interruption and stabilization in a safe and therapeutic environment. Pt's 
[facility] is considering conservatorship.

## 2022-09-23 VITALS — SYSTOLIC BLOOD PRESSURE: 142 MMHG | DIASTOLIC BLOOD PRESSURE: 66 MMHG

## 2022-09-23 VITALS — DIASTOLIC BLOOD PRESSURE: 71 MMHG | SYSTOLIC BLOOD PRESSURE: 116 MMHG

## 2022-09-23 RX ADMIN — BENZTROPINE MESYLATE SCH MG: 1 TABLET ORAL at 20:54

## 2022-09-23 RX ADMIN — BENZTROPINE MESYLATE SCH MG: 1 TABLET ORAL at 07:49

## 2022-09-23 RX ADMIN — DIVALPROEX SODIUM SCH MG: 250 TABLET, EXTENDED RELEASE ORAL at 07:49

## 2022-09-23 RX ADMIN — NICOTINE SCH PATCH: 21 PATCH, EXTENDED RELEASE TRANSDERMAL at 07:49

## 2022-09-23 RX ADMIN — IBUPROFEN SCH MG: 400 TABLET, FILM COATED ORAL at 17:32

## 2022-09-23 NOTE — NUR
PT GIVEN STIM OBJECT. Gave client playdoh to assist with grounding, psychomotor agitation. 
Ct responded positively.  Okayed by Hermelindo Murphy. Will retrieve at end of day.

## 2022-09-23 NOTE — NUR
Nursing Progress Note:



Problem: Per 5150 pt. is here for SA by OD on street drugs after command hallucinations 
telling him to do so. Pt. states that he took "Fentanyl, black ch[place], meth, 8 ball" in 
suicide attempt. Pt. states he feels hopeless. Pt. is also delusional, stating, "they won't 
give me my billion dollars". Pt. states, "I tried killing myself because Im a free renetta. 
They dont love me but I love them. Yes I do."



Interventions: 1:1 assessment, medication administration/education/monitoring, therapeutic 
conversation, active listening, ensured contract for safety, provided distraction, 
redirection, and positive reinforcement.



Response: 



Received pt sleeping in bed at shift change.  Patient up today walking the hallways and 
complaining of left ankle pain, Tylenol administered. New order for Ibuprofen b.i.d.  At 
approximately 12:00, patient was yelling loudly in hallway, singing and dancing around, 
being a disruption. Klonopin 2 mg was administered to patient along with Seroquel and 
Tylenol.  Patient responded well to this and became calmer and quieter, but continued to 
walk hallways and listen to headphones.  Patient denies feeling suicidal today and states 
that he feels "happy". 



Plan: Crisis interruption and stabilization in a safe and therapeutic environment. Pt's 
[facility] is considering conservatorship.

## 2022-09-23 NOTE — NUR
LEFT MESSAGE FOR CT'S  @ [facility] adult services: Getachew Sims [facility] 
[phone]. Purpose: preparation for any discharge per clinical course of care, also to 
get correct name of ct's payee. Ct states Levi is one of his two main supports, and his 
mentor. I assured ct I would call Levi.

-------------------------------------------------------------------------------

Addendum: 09/25/22 at 1018 by Sherley TEJADA

-------------------------------------------------------------------------------

DISREGARD:  SEE 9/22 09/22 note by TERRELL BURGOS

## 2022-09-24 VITALS — SYSTOLIC BLOOD PRESSURE: 114 MMHG | DIASTOLIC BLOOD PRESSURE: 70 MMHG

## 2022-09-24 VITALS — SYSTOLIC BLOOD PRESSURE: 111 MMHG | DIASTOLIC BLOOD PRESSURE: 60 MMHG

## 2022-09-24 RX ADMIN — IBUPROFEN SCH MG: 400 TABLET, FILM COATED ORAL at 17:35

## 2022-09-24 RX ADMIN — BENZTROPINE MESYLATE SCH MG: 1 TABLET ORAL at 07:00

## 2022-09-24 RX ADMIN — DIVALPROEX SODIUM SCH MG: 250 TABLET, EXTENDED RELEASE ORAL at 07:00

## 2022-09-24 RX ADMIN — IBUPROFEN SCH MG: 400 TABLET, FILM COATED ORAL at 07:00

## 2022-09-24 RX ADMIN — BENZTROPINE MESYLATE SCH MG: 1 TABLET ORAL at 21:29

## 2022-09-24 RX ADMIN — IBUPROFEN SCH MG: 400 TABLET, FILM COATED ORAL at 13:17

## 2022-09-24 RX ADMIN — NICOTINE SCH PATCH: 21 PATCH, EXTENDED RELEASE TRANSDERMAL at 06:59

## 2022-09-24 NOTE — NUR
RN PROGRESS NOTE (NOC):



LEGAL HOLD:  5150 for DTS/GD.



PROBLEM:  Client reported attempting to overdose on numerous street drugs to end his life.



INTERVENTION:

1. Medication administration.  2. Maintained a safe and supportive environment.  3. Provided 
clear and simple instructions and direction.  4. Encouraged ADLs.  5. Monitored behaviors  
6. Maintained clear boundaries, provided positive reinforcement.  7. Q15 minute safety 
checks.



RESPONSE:  Client was walking around the unit listening to the headphones and 'rapping' 
(loudly).  Reported that he was "doing good".  Clients mood has improved since Thursday 
night.  Took evening meds and had a snack.  Requested Tylenol for pain.  Fell asleep w/o 
difficulty.

## 2022-09-24 NOTE — NUR
Initial: Pt admit for schizoaffective disorder. Currently on a regular diet and eating well, 
documented with 100% PO intake of meals with the exception of refusing first meal on admit. 
D/w dietary to send double protein TID for satiety and to assist with meeting estimated 
nutrient needs. [facility] 9/23. Will continue to follow and monitor need for further nutrition 
intervention.

Recommendations:

1) Continue regular diet

2) Double eggs WB, double meat BIDLD

3) Bowel care PRN

4) Weekly scaled weights

-------------------------------------------------------------------------------

Addendum: 09/24/22 at 0931 by Venita Choudhary RD

-------------------------------------------------------------------------------

Amended: Links added.
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
## 2022-09-25 VITALS — DIASTOLIC BLOOD PRESSURE: 55 MMHG | SYSTOLIC BLOOD PRESSURE: 107 MMHG

## 2022-09-25 VITALS — SYSTOLIC BLOOD PRESSURE: 136 MMHG | DIASTOLIC BLOOD PRESSURE: 78 MMHG

## 2022-09-25 RX ADMIN — IBUPROFEN SCH MG: 400 TABLET, FILM COATED ORAL at 07:47

## 2022-09-25 RX ADMIN — DIVALPROEX SODIUM SCH MG: 250 TABLET, EXTENDED RELEASE ORAL at 07:47

## 2022-09-25 RX ADMIN — BENZTROPINE MESYLATE SCH MG: 1 TABLET ORAL at 07:47

## 2022-09-25 RX ADMIN — IBUPROFEN SCH MG: 400 TABLET, FILM COATED ORAL at 12:09

## 2022-09-25 RX ADMIN — IBUPROFEN SCH MG: 400 TABLET, FILM COATED ORAL at 17:35

## 2022-09-25 RX ADMIN — NICOTINE SCH PATCH: 21 PATCH, EXTENDED RELEASE TRANSDERMAL at 07:46

## 2022-09-25 RX ADMIN — BENZTROPINE MESYLATE SCH MG: 1 TABLET ORAL at 21:05

## 2022-09-25 NOTE — NUR
Nursing Progress Note:



Problem: Per 5150 pt. is here for SA by OD on street drugs after command hallucinations 
telling him to do so. Pt. states that he took "Fentanyl, black china, meth, 8 ball" in 
suicide attempt. Pt. states he feels hopeless. Pt. is also delusional, stating, "they won't 
give me my billion dollars". Pt. states, "I tried killing myself because Im a free renetta. 
They dont love me but I love them. Yes I do."



Interventions: 1:1 assessment, medication administration/education/monitoring, therapeutic 
conversation, active listening, ensured contract for safety, provided distraction, 
redirection, and positive reinforcement.



Response: Patient was in his room laying in bed at change of shift.  RN spoke to patient he 
answered questions and did not present as psychotic but appeared sleepy and did not want to 
engage. Patient denies suicidal ideation.  Patient also denies hearing voices.  Patient took 
his night medication which was Cogentin, Trazodone, Prazosin and Seroquel.  Patient was calm 
and cooperative.  Patient rolled to his side and attempting to sleep.  RN refilled his water 
and patient thanked RN. 



Plan: Crisis interruption and stabilization in a safe and therapeutic environment. [facility] is considering conservatorship.

## 2022-09-25 NOTE — NUR
Violence:

    Pt punched a hole in the wall in the community room. PT then yelling "Demonic voices" 
and continues threatening violence. Pt given Haldol 5mg IM, ativan 2mg IM benadryl 50mg IM 
with security at bedside.

## 2022-09-25 NOTE — NUR
Nursing Progress Note:



Problem: Per 5150 pt. is here for SA by OD on street drugs after command hallucinations 
telling him to do so. Pt. states that he took "Fentanyl, black china, meth, 8 ball" in 
suicide attempt. Pt. states he feels hopeless. Pt. is also delusional, stating, "they won't 
give me my billion dollars". Pt. states, "I tried killing myself because Im a free renetta. 
They dont love me but I love them. Yes I do."



Interventions: 1:1 assessment, medication administration/education/monitoring, therapeutic 
conversation, active listening, ensured contract for safety, provided distraction, 
redirection, and positive reinforcement.



Response: 



Patient up walking the halls pacing at change of shift.  Patient complains of foot pain and 
is currently on b.i.d. ibuprofen, Tylenol prn also administered.  Patient again states that 
he is "happy" today, although at one point appeared to be triggered by another patient, but 
when approached patient to see if he needed any anxiety medication, he said that he did not. 
 Patient is eating his meals, taking medications, and is cooperative, pacing the unit all 
day wearing headphones.  



Plan: Crisis interruption and stabilization in a safe and therapeutic environment. '[facility] is considering conservatorship.

## 2022-09-26 VITALS — SYSTOLIC BLOOD PRESSURE: 123 MMHG | DIASTOLIC BLOOD PRESSURE: 73 MMHG

## 2022-09-26 VITALS — SYSTOLIC BLOOD PRESSURE: 104 MMHG | DIASTOLIC BLOOD PRESSURE: 57 MMHG

## 2022-09-26 RX ADMIN — BENZTROPINE MESYLATE SCH MG: 1 TABLET ORAL at 07:48

## 2022-09-26 RX ADMIN — DIVALPROEX SODIUM SCH MG: 250 TABLET, EXTENDED RELEASE ORAL at 07:47

## 2022-09-26 RX ADMIN — BENZTROPINE MESYLATE SCH MG: 1 TABLET ORAL at 20:26

## 2022-09-26 RX ADMIN — NICOTINE SCH PATCH: 21 PATCH, EXTENDED RELEASE TRANSDERMAL at 07:47

## 2022-09-26 RX ADMIN — IBUPROFEN SCH MG: 400 TABLET, FILM COATED ORAL at 18:07

## 2022-09-26 RX ADMIN — IBUPROFEN SCH MG: 400 TABLET, FILM COATED ORAL at 07:48

## 2022-09-26 RX ADMIN — IBUPROFEN SCH MG: 400 TABLET, FILM COATED ORAL at 13:15

## 2022-09-26 NOTE — NUR
Nursing Progress Note:



Problem: Per 5150 pt. is here for SA by OD on street drugs after command hallucinations 
telling him to do so. Pt. states that he took "Fentanyl, black china, meth, 8 ball" in 
suicide attempt. Pt. states he feels hopeless. Pt. is also delusional, stating, "they won't 
give me my billion dollars". Pt. states, "I tried killing myself because Im a free renetta. 
They dont love me but I love them. Yes I do."



Interventions: 1:1 assessment, medication administration/education/monitoring, therapeutic 
conversation, active listening, ensured contract for safety, provided distraction, 
redirection, and positive reinforcement.



Response: 

Patient cooperative with care; compliant with medication. He expressed agitation and was 
provided PO Haldol 5mg, Ativan 2mg and Benadryl 50mg; medications were effective and patient 
went to bed shortly after. He woke up early AM; he was provided PRN Clonazepam and snack 
provided. Patient denies SI, HI, A/VH. 



Plan: Crisis interruption and stabilization in a safe and therapeutic environment. [facility] is considering conservatorship.

## 2022-09-26 NOTE — NUR
Nursing Progress Note:



Problem: Per 5150 pt. is here for SA by OD on street drugs after command hallucinations 
telling him to do so. Pt. states that he took "Fentanyl, black china, meth, 8 ball" in 
suicide attempt. Pt. states he feels hopeless. Pt. is also delusional, stating, "they won't 
give me my billion dollars". Pt. states, "I tried killing myself because Im a free renetta. 
They dont love me but I love them. Yes I do."



Interventions: 1:1 assessment, medication administration/education/monitoring, therapeutic 
conversation, active listening, ensured contract for safety, provided distraction, 
redirection, and positive reinforcement.



Response: 



Received patient sleeping in bed at shift change.  Patient states that he received Klonopin 
at 02:00 a.m. and that his nerves are being attacked.  Patient then started pacing hallways, 
but before long he was yelling that he was going to punch the windows because of his nerves 
being attacked. Patient had fists balled up and was red in the face.  He accepted prn Haldol 
and went into his room and took a nap.  Each day, patient tends to be set off by a client on 
the unit and interaction between them is limited due to instability.  Patient was given 
Seroquel and prn Klonopin and patient went back to sleep.  Later when patient awoke he was 
walking hallways with one of his peers and conversing.  Patient denies all mental health 
questions.  Patient was calm for the rest of the shift.  Would recommend scheduled Klonopin 
in the afternoon as this is when patient is easily agitated and labile.  Patient does not 
appear to pose a safety risk to staff, but has impulse control problems in terms of DTS/DTO. 
 Hospitalist rounded today and started him on Ultram 50 q4h prn, for ankle pain, and this 
was administered with effectiveness.



Plan: Crisis interruption and stabilization in a safe and therapeutic environment. Pt's 
[facility] is considering conservatorship. Frequent rounding for safety.

## 2022-09-27 VITALS — DIASTOLIC BLOOD PRESSURE: 59 MMHG | SYSTOLIC BLOOD PRESSURE: 100 MMHG

## 2022-09-27 VITALS — SYSTOLIC BLOOD PRESSURE: 130 MMHG | DIASTOLIC BLOOD PRESSURE: 60 MMHG

## 2022-09-27 RX ADMIN — BENZTROPINE MESYLATE SCH MG: 1 TABLET ORAL at 18:37

## 2022-09-27 RX ADMIN — IBUPROFEN SCH MG: 400 TABLET, FILM COATED ORAL at 08:03

## 2022-09-27 RX ADMIN — NICOTINE SCH PATCH: 21 PATCH, EXTENDED RELEASE TRANSDERMAL at 08:08

## 2022-09-27 RX ADMIN — IBUPROFEN SCH MG: 400 TABLET, FILM COATED ORAL at 13:36

## 2022-09-27 RX ADMIN — DIVALPROEX SODIUM SCH MG: 250 TABLET, EXTENDED RELEASE ORAL at 08:04

## 2022-09-27 RX ADMIN — IBUPROFEN SCH MG: 400 TABLET, FILM COATED ORAL at 17:02

## 2022-09-27 RX ADMIN — BENZTROPINE MESYLATE SCH MG: 1 TABLET ORAL at 08:04

## 2022-09-27 NOTE — NUR
Nursing Progress Note:



Problem: Per 5150 pt. is here for SA by OD on street drugs after command hallucinations 
telling him to do so. Pt. states that he took "Fentanyl, black china, meth, 8 ball" in 
suicide attempt. Pt. states he feels hopeless. Pt. is also delusional, stating, "they won't 
give me my billion dollars". Pt. states, "I tried killing myself because Im a free renetta. 
They dont love me but I love them. Yes I do."



Interventions: 1:1 assessment, medication administration/education/monitoring, therapeutic 
conversation, active listening, ensured contract for safety, provided distraction, 
redirection, and positive reinforcement.



Response: 

Patient is isolative to self, he appears internally preoccupied and is seen staring off and 
not paying attention to what is happening around him. Patient denies SI, HI, A/VH, but when 
RN asks to turn his bathroom light off he responds, No, that's when the demons and devils 
come out." He is medication compliant and lays down for bed. After being asleep for awhile 
he comes out of his room and says "I just had a dream of my people." He says he is having 
trouble sleeping. PRN Haldol given with good effect 



Plan: Crisis interruption and stabilization in a safe and therapeutic environment. Pt's 
[place] is considering conservatorship.

## 2022-09-27 NOTE — NUR
Nursing Progress Note



Problem: Per 5150 pt. is here for SA by OD on street drugs after command hallucinations 
telling him to do so. Pt. states that he took "Fentanyl, black china, meth, 8 ball" in 
suicide attempt. Pt. states he feels hopeless. Pt. is also delusional, stating, "they won't 
give me my billion dollars". Pt. states, "I tried killing myself because Im a free renetta. 
They dont love me but I love them. Yes I do."



Interventions: 1:1 assessment, medication administration/education/monitoring, therapeutic 
conversation, active listening, ensured contract for safety, provided distraction, 
redirection, and positive reinforcement. Early medication administration. 



Response:  

Pt was screaming in his room at shift change. Pt is very internally preoccupied and asks if 
he is going to die. PT is verbally reassured and staff lets pt pace while he calms down. Dr. Perla called and consulted, he gave the okay to give his HS medication early. Pt given HS 
medications at 1845 with good effect. An hour after medication administration he said, I am 
starting to feel normal again. PT lays down after eating snack and is able to fall asleep. 



Plan: Crisis interruption and stabilization in a safe and therapeutic environment. [facility] Frequent rounding for safety.

## 2022-09-27 NOTE — NUR
Nursing Progress Note



Problem: Per 5150 pt. is here for SA by OD on street drugs after command hallucinations 
telling him to do so. Pt. states that he took "Fentanyl, black china, meth, 8 ball" in 
suicide attempt. Pt. states he feels hopeless. Pt. is also delusional, stating, "they won't 
give me my billion dollars". Pt. states, "I tried killing myself because Im a free renetta. 
They dont love me but I love them. Yes I do."



Interventions: 1:1 assessment, medication administration/education/monitoring, therapeutic 
conversation, active listening, ensured contract for safety, provided distraction, 
redirection, and positive reinforcement.



Response:  Received pt in bed sleeping w/o distress in her room at change of shift. Pt woke 
and was cooperative with vitals. Pt ate breakfast and all meals and snacks well today. Later 
in day he c/o feeling constipated and not having a BM in three days and asked for prune 
juice, which he received. Pt became agitated in AM and loudly yelled and punched the window 
in his room. He received PRN Ativan and Haldol with good effect. Pt was able to lay down and 
rest. Pt paced halls and listened to music intermittently. Pt again became agitated in 
afternoon and received PRN Klonopin and Haldol. Pt escalated to punching doors and walls 
approx.. an hour after prns and Dr Pinzon was called and ordered Haldol 5mg and Ativan 1mg 
IM. Meds were administered as ordered. Pt reported being tormented by spirits. I just want 
to die. My soul is dying. 



Plan: Crisis interruption and stabilization in a safe and therapeutic environment. Pt's 
[facility] is considering conservatorship. Frequent rounding for safety.

## 2022-09-27 NOTE — NUR
Contacted OCP Dr Perla as pt continues to yell, states he feels he is possessed, wanting more 
meds for relief, prior meds given ineffective, order to given HS meds now given.  Dr Perla 
stated to watch patient closely for side effects.

## 2022-09-28 VITALS — DIASTOLIC BLOOD PRESSURE: 50 MMHG | SYSTOLIC BLOOD PRESSURE: 104 MMHG

## 2022-09-28 VITALS — DIASTOLIC BLOOD PRESSURE: 65 MMHG | SYSTOLIC BLOOD PRESSURE: 131 MMHG

## 2022-09-28 RX ADMIN — IBUPROFEN SCH MG: 400 TABLET, FILM COATED ORAL at 18:05

## 2022-09-28 RX ADMIN — NICOTINE SCH PATCH: 21 PATCH, EXTENDED RELEASE TRANSDERMAL at 08:26

## 2022-09-28 RX ADMIN — IBUPROFEN SCH MG: 400 TABLET, FILM COATED ORAL at 11:30

## 2022-09-28 RX ADMIN — BENZTROPINE MESYLATE SCH MG: 1 TABLET ORAL at 21:07

## 2022-09-28 RX ADMIN — IBUPROFEN SCH MG: 400 TABLET, FILM COATED ORAL at 08:26

## 2022-09-28 RX ADMIN — DIVALPROEX SODIUM SCH MG: 250 TABLET, EXTENDED RELEASE ORAL at 08:25

## 2022-09-28 RX ADMIN — BENZTROPINE MESYLATE SCH MG: 1 TABLET ORAL at 08:25

## 2022-09-28 NOTE — NUR
Nursing Progress Note



Problem: Per 5150 pt. is here for SA by OD on street drugs after command hallucinations 
telling him to do so. Pt. states that he took "Fentanyl, black china, meth, 8 ball" in 
suicide attempt. Pt. states he feels hopeless. Pt. is also delusional, stating, "they won't 
give me my billion dollars". Pt. states, "I tried killing myself because Im a free renetta. 
They dont love me but I love them. Yes I do."



Interventions: 1:1 assessment, medication administration/education/monitoring, therapeutic 
conversation, active listening, ensured contract for safety, provided distraction, 
redirection, and positive reinforcement.



Response: Received pt awake in his room. 1:1 done at bedside. Pt states "I slept too much, 
the anxiety medications make me sleepy, I don't want to be here."  Medications administered 
with no issues. Pt attended both breakfast and lunch, eating 100%. Pt requested PRN klonopin 
at 0850 for c/o anxiety, pt appeared calm and was cooperative. Pt then requested medication 
for agitation/psychosis, haldol was administer at 1130am. Pt continued to have an okay day 
and stayed calm till appox. 1515 when he started starring off into space, yelling "I'm going 
to die, I need to get out of here", PRN klonopin administer again and pt attempted to sit in 
his room. Several minutes later he started yelling bizarre statements and then started 
banging his head on his toilet. PRN IM of 

2mg ativan, 10mg haldol, 50mg benadryl administered with effective results. Pt. resting in 
bed.    



 

Plan: Crisis interruption and stabilization in a safe and therapeutic environment. Pt's 
[facility] is considering conservatorship. Frequent rounding for safety.

## 2022-09-29 VITALS — DIASTOLIC BLOOD PRESSURE: 77 MMHG | SYSTOLIC BLOOD PRESSURE: 128 MMHG

## 2022-09-29 VITALS — SYSTOLIC BLOOD PRESSURE: 111 MMHG | DIASTOLIC BLOOD PRESSURE: 56 MMHG

## 2022-09-29 RX ADMIN — DIVALPROEX SODIUM SCH MG: 250 TABLET, EXTENDED RELEASE ORAL at 07:45

## 2022-09-29 RX ADMIN — NICOTINE SCH PATCH: 21 PATCH, EXTENDED RELEASE TRANSDERMAL at 07:45

## 2022-09-29 RX ADMIN — BENZTROPINE MESYLATE SCH MG: 1 TABLET ORAL at 20:43

## 2022-09-29 RX ADMIN — IBUPROFEN SCH MG: 400 TABLET, FILM COATED ORAL at 11:57

## 2022-09-29 RX ADMIN — IBUPROFEN SCH MG: 400 TABLET, FILM COATED ORAL at 07:45

## 2022-09-29 RX ADMIN — DOCUSATE SODIUM SCH MG: 100 CAPSULE, LIQUID FILLED ORAL at 07:46

## 2022-09-29 RX ADMIN — IBUPROFEN SCH MG: 400 TABLET, FILM COATED ORAL at 17:04

## 2022-09-29 RX ADMIN — BENZTROPINE MESYLATE SCH MG: 1 TABLET ORAL at 07:45

## 2022-09-29 NOTE — NUR
Nursing Progress Note:



Problem: Per 5150 pt. is here for SA by OD on street drugs after command hallucinations 
telling him to do so. Pt. states that he took "Fentanyl, black china, meth, 8 ball" in 
suicide attempt. Pt. states he feels hopeless. Pt. is also delusional, stating, "they won't 
give me my billion dollars". Pt. states, "I tried killing myself because Im a free renetta. 
They dont love me but I love them. Yes I do."



Interventions: 1:1 assessment, medication administration/education/monitoring, therapeutic 
conversation, active listening, ensured contract for safety, provided distraction, 
redirection, and positive reinforcement.



Response: Patient has been in a pretty good mood today. He has been pleasant and cooperative 
with staff, and has been compliant with all medications. He has utilized his PRNs today for 
pain and calmness. Patient has made delusional comments about the freemasons and illuminati, 
but has shown no anger or agitation. He stops to talk often about what hes thinking about, 
and is not discouraged from doing so. He denies all MH symptoms, but is obviously 
hallucinating and delusional.     



 Plan: Crisis interruption and stabilization in a safe and therapeutic environment. Pt's 
[facility] is considering conservatorship. Frequent rounding for safety.

## 2022-09-30 VITALS — DIASTOLIC BLOOD PRESSURE: 92 MMHG | SYSTOLIC BLOOD PRESSURE: 114 MMHG

## 2022-09-30 VITALS — SYSTOLIC BLOOD PRESSURE: 110 MMHG | DIASTOLIC BLOOD PRESSURE: 63 MMHG

## 2022-09-30 RX ADMIN — BENZTROPINE MESYLATE SCH MG: 1 TABLET ORAL at 20:28

## 2022-09-30 RX ADMIN — IBUPROFEN SCH MG: 400 TABLET, FILM COATED ORAL at 20:28

## 2022-09-30 RX ADMIN — DOCUSATE SODIUM SCH MG: 100 CAPSULE, LIQUID FILLED ORAL at 07:41

## 2022-09-30 RX ADMIN — IBUPROFEN SCH MG: 400 TABLET, FILM COATED ORAL at 07:41

## 2022-09-30 RX ADMIN — BENZTROPINE MESYLATE SCH MG: 1 TABLET ORAL at 07:41

## 2022-09-30 RX ADMIN — DIVALPROEX SODIUM SCH MG: 250 TABLET, EXTENDED RELEASE ORAL at 07:41

## 2022-09-30 RX ADMIN — IBUPROFEN SCH MG: 400 TABLET, FILM COATED ORAL at 13:15

## 2022-09-30 RX ADMIN — NICOTINE SCH PATCH: 21 PATCH, EXTENDED RELEASE TRANSDERMAL at 07:42

## 2022-09-30 NOTE — NUR
DISCHARGE: CONSERVATORSHIP

Received instructions from [facility] Public Guardian, drafted the packet they require, 
emailed drafts to [employer]. Ultimately need to be signed by Dr. Pinzon and reviewed and signed (or 
not) by Mike.

## 2022-09-30 NOTE — NUR
Nursing Progress Note



Problem: Per 5150 pt. is here for SA by OD on street drugs after command hallucinations 
telling him to do so. Pt. states that he took "Fentanyl, black china, meth, 8 ball" in 
suicide attempt. Pt. states he feels hopeless. Pt. is also delusional, stating, "they won't 
give me my billion dollars". Pt. states, "I tried killing myself because Im a free renetta. 
They dont love me but I love them. Yes I do."



Interventions: 1:1 assessment, medication administration/education/monitoring, therapeutic 
conversation, active listening, ensured contract for safety, provided distraction, 
redirection, and positive reinforcement.



Response: Patient is pleasant and cooperative with care; compliant with medication. PRN 
Clonazepam provided for c/o increased anxiety. Patient appeared elevated this shift; singing 
and pacing the halls. He continues to report feeling depressed d/t missing his son; denies 
SI, HI, A/VH. He appeared to be expressing delusional thought content. As he was explaining 
being a free renetta for ten years, he continued to state, "they're not all evil but I had to 
sacrifice my first born." Patient participated in HS snack and socialized with male peer 
prior to bed; observed sleeping and does not appear to be having difficulty. 



 

Plan: Crisis interruption and stabilization in a safe and therapeutic environment. Pt's 
[facility] is considering conservatorship. Frequent rounding for safety.

## 2022-10-01 VITALS — SYSTOLIC BLOOD PRESSURE: 112 MMHG | DIASTOLIC BLOOD PRESSURE: 66 MMHG

## 2022-10-01 VITALS — SYSTOLIC BLOOD PRESSURE: 103 MMHG | DIASTOLIC BLOOD PRESSURE: 45 MMHG

## 2022-10-01 RX ADMIN — DOCUSATE SODIUM SCH MG: 100 CAPSULE, LIQUID FILLED ORAL at 08:12

## 2022-10-01 RX ADMIN — BENZTROPINE MESYLATE SCH MG: 1 TABLET ORAL at 08:11

## 2022-10-01 RX ADMIN — IBUPROFEN SCH MG: 400 TABLET, FILM COATED ORAL at 17:12

## 2022-10-01 RX ADMIN — BENZTROPINE MESYLATE SCH MG: 1 TABLET ORAL at 20:31

## 2022-10-01 RX ADMIN — DIVALPROEX SODIUM SCH MG: 250 TABLET, EXTENDED RELEASE ORAL at 08:12

## 2022-10-01 RX ADMIN — NICOTINE SCH PATCH: 21 PATCH, EXTENDED RELEASE TRANSDERMAL at 08:12

## 2022-10-01 RX ADMIN — IBUPROFEN SCH MG: 400 TABLET, FILM COATED ORAL at 08:11

## 2022-10-01 RX ADMIN — IBUPROFEN SCH MG: 400 TABLET, FILM COATED ORAL at 12:30

## 2022-10-01 NOTE — NUR
Nursing Progress Note



Problem: Per 5150 pt. is here for SA by OD on street drugs after command hallucinations 
telling him to do so. Pt. states that he took "Fentanyl, black china, meth, 8 ball" in 
suicide attempt. Pt. states he feels hopeless. Pt. is also delusional, stating, "they won't 
give me my billion dollars". Pt. states, "I tried killing myself because Im a free renetta. 
They dont love me but I love them. Yes I do."



Interventions: 1:1 assessment, medication administration/education/monitoring, therapeutic 
conversation, active listening, ensured contract for safety, provided distraction, 
redirection, and positive reinforcement.



Response:  Patient was seen pacing at [facility]. He paced most of the day, and through this 
evening. He reports that hes feeling good and hes happy. I still want to learn to 
moonwalk. He denies any problems with his medications, and continues to be compliant. 
Patient still makes delusion statements, but in a humorous way. He seems to be at peace 
right now. Patient has been consuming a lot of water as he walks. Patient appears to have a 
grin on his face as he paces with headphones. There have been no adverse behaviors.



Plan: Crisis interruption and stabilization in a safe and therapeutic environment. '[facility] is considering conservatorship. Frequent rounding for safety.

## 2022-10-01 NOTE — NUR
Nursing Progress Note



Problem: Per 5150 pt. is here for SA by OD on street drugs after command hallucinations 
telling him to do so. Pt. states that he took "Fentanyl, black china, meth, 8 ball" in 
suicide attempt. Pt. states he feels hopeless. Pt. is also delusional, stating, "they won't 
give me my billion dollars". Pt. states, "I tried killing myself because Im a free renetta. 
They dont love me but I love them. Yes I do."



Interventions: 1:1 assessment, medication administration/education/monitoring, therapeutic 
conversation, active listening, ensured contract for safety, provided distraction, 
redirection, and positive reinforcement.



Response: Pt received awake pacing the halls with headphones at change of shift. Attended 
breakfast. 1:1 done at bedside, pt. states his sleep is not good, Shauna been up since 4am. 
Pt denies A/VH stating he only experiences this when using meth. PRN klonopin for 
agitation/psychosis administer at 10am and PRN tramadol for chest pain from working out.  
Pt. continued to speak of the devil and have delusional bizarre statements, yelling and 
repeatedly stating I choose death. At approx. 11am pt. had increased psychosis/verbal 
outbursts needing an IM replacement of Ativan 2mg, Haldol 10mg and Benadryl 50mg. Medication 
effective and pt resting in his bed. Pt did not attend lunch but was seen attending snack. 



Plan: Crisis interruption and stabilization in a safe and therapeutic environment. Pt's 
[facility] is considering conservatorship. Frequent rounding for safety.

## 2022-10-01 NOTE — NUR
Pt requested PO klonopin and pain reliever at 1000. PO's administered. Mike's agitation 
and psychosis did not appear to be lessening. Pt. was pacing up and down the halls speaking 
of the devil and "I need to get out of here, I choose death." Mike continued to 
escalate, yelling and  starring out his window stating,"I choose death!" PO Haldol offered 
and declined. Provider notified and order for Im Ativan 2mg, Benadryl 50mg, Haldol 10mg. 
Mike had to be encouraged to take the IM. Pt tolerated well. Pt attended snack and is 
now in his bedroom.

## 2022-10-02 VITALS — SYSTOLIC BLOOD PRESSURE: 100 MMHG | DIASTOLIC BLOOD PRESSURE: 56 MMHG

## 2022-10-02 VITALS — DIASTOLIC BLOOD PRESSURE: 61 MMHG | SYSTOLIC BLOOD PRESSURE: 113 MMHG

## 2022-10-02 RX ADMIN — IBUPROFEN SCH MG: 400 TABLET, FILM COATED ORAL at 12:43

## 2022-10-02 RX ADMIN — IBUPROFEN SCH MG: 400 TABLET, FILM COATED ORAL at 17:46

## 2022-10-02 RX ADMIN — BENZTROPINE MESYLATE SCH MG: 1 TABLET ORAL at 19:29

## 2022-10-02 RX ADMIN — DIVALPROEX SODIUM SCH MG: 250 TABLET, EXTENDED RELEASE ORAL at 07:44

## 2022-10-02 RX ADMIN — NICOTINE SCH PATCH: 21 PATCH, EXTENDED RELEASE TRANSDERMAL at 07:46

## 2022-10-02 RX ADMIN — DOCUSATE SODIUM SCH MG: 100 CAPSULE, LIQUID FILLED ORAL at 07:46

## 2022-10-02 RX ADMIN — IBUPROFEN SCH MG: 400 TABLET, FILM COATED ORAL at 07:45

## 2022-10-02 RX ADMIN — MAGNESIUM HYDROXIDE PRN ML: 400 SUSPENSION ORAL at 13:14

## 2022-10-02 RX ADMIN — BENZTROPINE MESYLATE SCH MG: 1 TABLET ORAL at 07:46

## 2022-10-02 NOTE — NUR
Problem : Pt admitted to [facility] on 9/27/22 on a 5150 for DTS.  Pt 
drank isopropyl alcohol in an attempt to commit suicide.  This is the second suicide attempt 
in the past month.  Pt has history of depression and anxiety. Pt is going through a divorce 
and children are with father, she feels she has nothing to live for.



Interventions : Maintained a safe and supportive environment, ensured contract for safety, 
provided clear and simple instructions, provided active listening and positive 
encouragement, encouraged participation on the unit, and maintained Q 15min safety checks. 



Response : Received pt. sleeping in bed at the beginning of the shift, she awoke and came to 
breakfast. When administering AM meds pt requested atarax. 1:1 was completed at bedside. Pt 
states, She is not suicidal and never has been, but she has been situationally depressed 
because her  has  from her. That there are times she doesnt want to exist 
anymore, but would never attempt suicide. That she does not & never has had a plan for 
suicide.  Her affect appears blunted and her speech remains soft and she engages in minimal 
conversation. Pt. continues to isolate in bedroom throughout much of the shift reading her 
books, but does get up to attend meals & snacks. At approximately 1100 pt. reported anxiety 
and asked if she had anything more for anxiety. Clonazepam PRN administered with good 
effectiveness. Provided active listening and positive encouragement.



Plan : Pt. continues to require medication adjustments and a safe and supportive 
environment.

## 2022-10-02 NOTE — NUR
Nursing Progress Note



Problem: Per 5150 pt. is here for SA by OD on street drugs after command hallucinations 
telling him to do so. Pt. states that he took "Fentanyl, black china, meth, 8 ball" in 
suicide attempt. Pt. states he feels hopeless. Pt. is also delusional, stating, "they won't 
give me my billion dollars". Pt. states, "I tried killing myself because Im a free renetta. 
They dont love me but I love them. Yes I do."



Interventions: 1:1 assessment, medication administration/education/monitoring, therapeutic 
conversation, active listening, ensured contract for safety, provided distraction, 
redirection, and positive reinforcement.



Response: Patient in room laying down with eyes shut. Patient reports feeling anxious. 
Patient denies A/VH SI HI. Patient given 5mg Haldol for anxiety. Patient reports that he 
wants to go back to [facility] because, "they give out free meth." Patient also wants to be 
conserved and leave [facility] for another facility stating he doesn't feel as if he is getting the 
help he needs. Patient ate snack in community room isolated to self. The patient took 
evening meds w/o complications. The patient went to bed shortly after.



Plan: Crisis interruption and stabilization in a safe and therapeutic environment. 's 
[facility] is considering conservatorship. Frequent rounding for safety.

## 2022-10-02 NOTE — NUR
Nursing Progress Note



Problem: Per 5150 pt. is here for SA by OD on street drugs after command hallucinations 
telling him to do so. Pt. states that he took "Fentanyl, black china, meth, 8 ball" in 
suicide attempt. Pt. states he feels hopeless. Pt. is also delusional, stating, "they won't 
give me my billion dollars". Pt. states, "I tried killing myself because Im a free renetta. 
They dont love me but I love them. Yes I do."



Interventions: 1:1 assessment, medication administration/education/monitoring, therapeutic 
conversation, active listening, ensured contract for safety, provided distraction, 
redirection, and positive reinforcement.



Response: at shift change, pt found in bed with eyes closed; pt awaken for assessment; 
wearing regular clothes; states he did not have a good day; allowed pt to verbalize why it 
wasn't a good day, but stated he didn't want to;  states he had a nightmare that he "killed 
his brother & smashed his brains"; cooperative during his assessment; took all of his 
scheduled meds; isolates to room; denies SI & HI; denies hearing voices.



Plan: Crisis interruption and stabilization in a safe and therapeutic environment. Pt's 
[facility] is considering conservatorship. Frequent rounding for safety. Transplant Admission H&P:    Date: 8/19/2022    CC/Reason for Admission: hx of DDKT 8/5/22, perinephric fluid collection    Subjective: Reports to be feeling well. Says he had some drain site pain this morning which resolved with pain medication. Denies F/C, CP, SOB, N/V, abdominal pain, lightheadedness. He is having normal BMs and eating without difficulty.    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 Patient started HD 04/20219. He was quickly transitioned to PD. He estimates he was still making about 1L of urine per day. Patient underwent DDKT 8/5/22. A total of 6 mg/kg thymoglobulin induction was given. Post operative course complicated by DGF due to prolonged cold ischemia time requiring intermittent HD. Hgb dropped to 6.8 on POD 2. Renal US demonstrated a small fluid collection near the transplanted kidney. 1 unit of PRBCs were given and hemoglobin stabilized. Patient had decrease in urine output 8/9/22. IV lasix was given per nephrology and urine output improved. Patient was discharge home the evening of 8/10/22. Patient returned home and woke up that night feeling \"off\" with tingling of the right side of his face and numbness of his right thigh and knee. He called the Transplant Clinic and was directed to present to the ED. CT head was negative for acute abnormality. CT AP w/o contrast demonstrated moderate peritransplant fat stranding/fluid, likely within postoperative limits. Anasarca with a small amount of ascites. Troponin and EKG wnl. The patient was admitted for further monitoring and neurology consult. Neurology evaluated the patient and MRI brain was recommended and unremarkable. Patient's facial paresthesias resolved and no further neurologic work-up was recommended. Patient had decreased UOP on day of admission. US Renal TXP was obtained and demonstrated  patent vasculature with elevated iliac artery peak systolic velocity at the  anastomosis.  Mild elevation of the cortical resistive indices compared to prior. Previously seen perinephric fluid collection resolved. Renal function was stable, UOP improved, and patient was discharged home 8/12/22.    At clinic follow-up on 8/15/22, the patient reported new right lower abdominal pain. TXP renal US was obtained and demonstrated a new large (19.0 cm) perinephric collection. The patient was subsequently directly admitted for further care. Patient reports continued right lower abdominal pain, which is now extending to his right testicle. States pain is improving with pain medication. The patient reports he has been somewhat constipated. He has not had a BM for 2 days. He is still passing gas. Denies F/C, N/V/D/C, CP, SOB, lightheadedness. He has had good urine output, ~2.5 L per day prior to admission.        Patient Active Problem List    Diagnosis Date Noted   • Abdominal fluid collection 08/16/2022     Priority: Low   • Facial numbness 08/11/2022     Priority: Low   • Numbness around mouth 08/11/2022     Priority: Low   • Kidney transplant candidate 08/04/2022     Priority: Low   • ESRD (end stage renal disease) on dialysis (CMS/[facility]) 07/19/2021     Priority: Low       Past Medical History:   Diagnosis Date   • DVT (deep venous thrombosis) (CMS/[facility]) 2012    Left lower leg   • ESRD (end stage renal disease) on dialysis (CMS/[facility]) 2019   • FSGS (focal segmental glomerulosclerosis) 2006   • Hypertension    • Manic depressive disorder (CMS/[facility])    • Varicella without mention of complication        Past Surgical History:   Procedure Laterality Date   • Kidney transplant  08/05/2022       Prior to Admission medications    Medication Sig Start Date End Date Taking? Authorizing Provider   oxyCODONE, IMM REL, (Roxicodone) 5 MG immediate release tablet Take 1 tablet by mouth every 6 hours as needed for Pain. 8/15/22  Yes Sneha Sanchez PA-C   gabapentin (NEURONTIN) 100 MG capsule Take 1 capsule by mouth in  the morning and 1 capsule at noon and 1 capsule in the evening. 8/15/22  Yes Sneha Sanchez PA-C   polyethylene glycol (MIRALAX) 17 g packet Take 17 g by mouth daily as needed (constipation). Stir and dissolve powder in any 4 to 8 ounces of beverage, then drink. 8/12/22  Yes Sneha Sanchez PA-C   TACROlimus (PROGRAF) 1 MG capsule Take up to 4 capsules by mouth twice daily as directed. Current Dose 9 mg twice daily.  Patient taking differently: Take 4 mg by mouth as directed. Take up to 4 capsules by mouth twice daily as directed. Current Dose 9 mg twice daily. 8/12/22  Yes Magdalena Vasquez PA-C   TACROlimus (PROGRAF) 5 MG capsule Take up to 1 capsule by mouth twice daily as directed. Current Dose 9 mg twice daily.  Patient taking differently: Take 5 mg by mouth as directed. Take up to 1 capsule by mouth twice daily as directed. Current Dose 9 mg twice daily. 8/12/22  Yes Magdalena Vasquez PA-C   senna (SENOKOT) 8.6 MG tablet Take 1 tablet by mouth daily as needed.   Yes Outside Provider   sulfamethoxazole-trimethoprim (Bactrim) 400-80 MG per tablet Take 1 tablet by mouth 3 days a week. Take Monday, Wednesday, and Friday. 8/10/22  Yes Sneha Sanchez PA-C   acyclovir (ZOVIRAX) 400 MG tablet Take 1 tablet by mouth in the morning and 1 tablet in the evening. 8/10/22  Yes Sneha Sanchez PA-C   pantoprazole (PROTONIX) 40 MG tablet Take 1 tablet by mouth daily before breakfast for 26 days. Do not start before August 10, 2022.  Patient taking differently: Take 40 mg by mouth daily. 8/10/22 9/5/22 Yes Sneha Sanchez PA-C   predniSONE (DELTASONE) 5 MG tablet Take 4 tablets by mouth daily 8/10 - 8/16, THEN 3 tablets daily 8/17 - 8/23, THEN 2 tablets daily 8/24 - 8/30, THEN 1 tablet daily. 8/9/22 10/27/22 Yes Sneha Sanchez PA-C   mycophenolate (CellCept) 500 MG tablet Take 2 tablets by mouth in the morning and 2 tablets in the evening. 8/4/22  Yes Jeanette Pollard PA-C   TACROlimus (PROGRAF) 0.5 MG capsule Take  up to 1 capsule twice daily as directed.  Patient taking differently: Take by mouth 2 times daily. Take up to 1 capsule twice daily as directed. 8/4/22  Yes Jeanette Pollard PA-C   ARIPiprazole (ABILIFY) 5 MG tablet Take 1.5 tablets by mouth daily. 6/13/22 8/16/22 Yes Tristin Funk,    aspirin 81 MG chewable tablet Chew 81 mg by mouth daily.   Yes Outside Provider   furosemide (Lasix) 40 MG tablet Take 1 tablet by mouth daily. Start taking the evening of 8/10/22.  Patient taking differently: Take 40 mg by mouth daily. 8/15/22   Harmony Arias MD   metoPROLOL tartrate (LOPRESSOR) 25 MG tablet Take 1 tablet by mouth every 12 hours. 8/9/22 8/10/22  Sneha Sanchez PA-C       ALLERGIES:   Allergen Reactions   • Contrast Media Other (See Comments)     Patient has a history of Kidney Transplant. Please discuss contrast administration with Transplant Nephrologist prior to administering, [phone].            Social History     Tobacco Use   • Smoking status: Never Smoker   • Smokeless tobacco: Never Used   Substance Use Topics   • Alcohol use: No       Family History   Problem Relation Age of Onset   • Cancer Mother         ovarian   • Patient is unaware of any medical problems Father    • Cancer Paternal Grandfather         colon cancer       Objective:     Vitals:    08/19/22 0547   BP: 136/84   Pulse: 73   Resp: 18   Temp: 97.9 °F (36.6 °C)       Physical Exam  General- Awake/alert in NAD. Calm/cooperative. Well nourished.  Skin- Warm and dry to touch. No rashes/lesions. No jaundice.   Eyes- No scleral icterus. EOMs intact.   CV- S1S2, RRR. No murmurs, rubs or gallops appreciated.  Pulmonary- Clear breath sounds to auscultation. On RA. Normal Respiratory Effort.  Abdomen- Round, soft, non-tender, and non-distended with palpation. + BS. No appreciable hepatosplenomegaly. IR drain in place with serosanguineous output. Abdominal incision with staples intact. No surrounding erythema, edema, drainage, or  tenderness.   - Mild scrotal edema. No testicular tenderness or masses appreciated.  Extremities- + PP (radial/pedal). No erythema. No edema.   Neuro-  No focal deficits. Strength and sensation grossly intact. Speech clear.   Psych- Alert and oriented to person, place, and time. Mood and affect appropriate.     Diagnostics/Imaging:  RENAL ARTERY DUPLEX AND ABDOMINAL TRANSPLANT ULTRASOUND 8/16/22  1. New large (19.0 cm) perinephric collection. Appearance would favor  hematoma or complex seroma. Infection cannot be ruled out on ultrasound.  Clinically correlate.  2. Interval normalization of resistive indices.  3. Mildly elevated iliac artery velocity. Iliac artery stenosis is not  excluded.    CT TRANSPLANT ABDOMEN PELVIS WO CONTRAST 8/17/22  1.  Large collection with simple fluid density in the right hemipelvis  adjacent to the right lower quadrant renal transplant, new from 8/11/2022  and redemonstrated from renal ultrasound 8/16/2022. Associated mass effect  on the adjacent bowel and bladder. Differential favor postoperative seroma  versus urinoma over hematoma. Correlation with tissue sampling as indicated  may be considered.  2.  No hydronephrosis in the transplant kidney.   3.  Unchanged bibasilar lung scarring/atelectasis    US TESTICLES AND SCROTUM W DUPLEX 8/17/22  1. Bilateral testes and epididymides appear hyperemic. This could be  imaging artifact or could represent bilateral orchitis and bilateral  epididymitis. Correlation is needed with clinical information.  2. Small right hydrocele.  3. No testicular mass.  4. Right testis measures 4.7 x 3.1 x 2.6 cm.   5. Left testis measures 4.9 x 3.0 x 2.5 cm.    Assessment/Plan  1. H/O ESRD due to biopsy proven FSGS s/p DDKT 8/5/22. POD 14.             -- Thymoglobulin induction 6 mg/kg total, completed 8/10/22.              -- Delayed Graft function. Last HD 8/8/22.   -- Allograft function improving. Good UOP. Continue to monitor.   -- Diuretic management per  nephrology.             -- Monitor incision. Staple removal POD 21.             --  Continue bASA.             -- TXP Renal US 8/16/22 and CT AP 8/17/22 with large (19.0 cm) perinephric fluid collection.    - S/P IR drain placement 8/17/22.     - ~ 1 L output / 24 hrs, decreasing. Continue to monitor.    - Continue maintenance fluids at 100cc/hr with no drain replacements per nephrology.     - Creatinine fluid > serum creatinine--> concerning for urine leak.     -- Maintain salinas in place.     -- Plan to repeat fluid creatinine tomorrow (8/20).     -- Will consider renogram pending repeat fluid creat/drain outputs.     - Continue Zosyn.     2. Anemia    -- Hgb 6.8 S/P 1 un PRBCs 8/17/22.   -- Hgb stable today.    -- Drain output becoming progressively less sanguineous.   -- Continue to monitor.    3. Right testicular pain, resolved.   -- Testicular US 8/17/22 demonstrating hyperemic bilateral testes and epididymides.   -- Continue to monitor.     4. Acute LFT elevation   -- LFTs downtrending.   -- Liver US 8/17/22 sonographically normal with patent vasculature.    -- Mass effect from fluid collection as above possibly contributing.   -- EBV and CMV negative.   -- BK pending.   -- Ganciclovir discontinued 8/19.   -- LFTs improving despite bactrim administration. Unlikely DILI. Continue bactrim.   -- Hepatology following.    5. Prophylactic Immunosuppression.              -- Prograf BID, goal 7-10.             -- Cellcept 1000 mg BID.             -- Prednisone taper                        - 15 mg QD 8/17/22- 8/23/22                        - 10 mg QD 8/24/22- 8/30/22                        - 5 mg QD 8/31/22- indefinitely     6. Post-Transplant Prophylaxis             -- Fungal Prophylaxis. Nystain completed during previous admission.             -- PCP/UTI Prophylaxis. On Bactrim SS daily x 6 months (end date 2/5/2022).             -- CMV Prophylaxis(D-/R-). On Acyclovir x 3 months (end date 11/5/2022).             --  GI Prophylaxis. Protonix 40 mg QHS x 1 month (end date 9/5/2022).             -- Toxo Prophylaxis (D-/R-). No need for prophylaxis.      7. Post-op Pain Management             -- Tylenol PRN held due to LFT elevation.               -- PRN oxycodone and dilaudid.      8. Hypertension   -- Management per nephrology.      9. Disposition. Maintain inpatient.    D/W patient, nephrology, hepatology, and RN    Sneha Sanchez PA-C  Abdominal Transplant & Hepatology  8/19/2022    After 3:00 PM please call the answering service and page the [facility] on call for the service.

## 2022-10-03 VITALS — SYSTOLIC BLOOD PRESSURE: 110 MMHG | DIASTOLIC BLOOD PRESSURE: 64 MMHG

## 2022-10-03 VITALS — DIASTOLIC BLOOD PRESSURE: 71 MMHG | SYSTOLIC BLOOD PRESSURE: 112 MMHG

## 2022-10-03 RX ADMIN — IBUPROFEN SCH MG: 400 TABLET, FILM COATED ORAL at 17:30

## 2022-10-03 RX ADMIN — DIVALPROEX SODIUM SCH MG: 250 TABLET, EXTENDED RELEASE ORAL at 08:52

## 2022-10-03 RX ADMIN — NICOTINE POLACRILEX PRN MG: 2 LOZENGE ORAL at 12:40

## 2022-10-03 RX ADMIN — BENZTROPINE MESYLATE SCH MG: 1 TABLET ORAL at 20:02

## 2022-10-03 RX ADMIN — IBUPROFEN SCH MG: 400 TABLET, FILM COATED ORAL at 12:18

## 2022-10-03 RX ADMIN — IBUPROFEN SCH MG: 400 TABLET, FILM COATED ORAL at 08:52

## 2022-10-03 RX ADMIN — DOCUSATE SODIUM SCH MG: 100 CAPSULE, LIQUID FILLED ORAL at 08:54

## 2022-10-03 RX ADMIN — NICOTINE SCH PATCH: 21 PATCH, EXTENDED RELEASE TRANSDERMAL at 08:00

## 2022-10-03 RX ADMIN — BENZTROPINE MESYLATE SCH MG: 1 TABLET ORAL at 08:52

## 2022-10-03 RX ADMIN — DIVALPROEX SODIUM SCH MG: 250 TABLET, DELAYED RELEASE ORAL at 19:59

## 2022-10-03 NOTE — NUR
CONTACT FROM COMMUNITY PROVIDER

Naima Calderón MD from [facility] [phone], [phone]. Received phone call from Naima Calderón MD from [facility], who tracked down client---who has been her patient since his teens--via 
[facility]. Dr. Calderón stated client gave his okay to speak with CHER Calderón MD.  Dr. Guardado sought out information because client's mother has not know client's 
location and has been very concerned. Dr. Calderón hopes for a doctor to doctor call re client. 
This writer will give this information to Dr. Escobar.

-------------------------------------------------------------------------------

Addendum: 10/05/22 at 1444 by Sherley TEJADA

-------------------------------------------------------------------------------

Correction on [facility] number/Naima Calderón MD:  [phone]

## 2022-10-03 NOTE — NUR
Reassessment: Pt continues on Regular diet while receiving double protein TID, eating 100% 
of meals meeting est needs at this time. [facility] 9/30 receiving routine and PRN bowel care. No 
change to recommendations at this time, will continue to monitor.

Recommendations:

1) Continue regular diet

2) Double eggs WB, double meat BIDLD

3) Bowel care PRN

4) Weekly scaled weights

-------------------------------------------------------------------------------

Addendum: 10/03/22 at 1318 by Loc Villalba RD

-------------------------------------------------------------------------------

Amended: Links added.

## 2022-10-03 NOTE — NUR
Nursing Progress Note      



Problem: Per 5150 pt. is here for SA by OD on street drugs after command hallucinations 
telling him to do so. Pt. states that he took "Fentanyl, black ch[place], meth, 8 ball" in 
suicide attempt. Pt. states he feels hopeless. Pt. is also delusional, stating, "they won't 
give me my billion dollars". Pt. states, "I tried killing myself because Im a free renetat. 
They dont love me but I love them. Yes I do."



Interventions: 1:1 assessment, medication administration/education/monitoring, therapeutic 
conversation, active listening, ensured contract for safety, provided distraction, 
redirection, and positive reinforcement.



Response:  Received Pt in his room resting and in no distress at the beginning of this 
shift. Pt cooperative with vitals and walked halls before breakfast. Pt ate well and 
returned to his room with some yelling. Pt later broke the exit sign in hallway near his 
room. Pt refused AM meds at first, but with patience and persistence he became willing to 
take them, along with Klonopin PRN and did take them.  Pt became increasingly agitated, 
wanting to leave and becoming angry that his mother was worried about him. He complained 
that other Pts had left and was anxious about the possibility of being conserved. Pt c/o 
demons and witchcraft, and was able to calm while talking with this RN. Pt had intermittent 
loud outbursts until afternoon when he was punching walls, screaming and threatening other 
Pts. Pt was given emergent IM meds and then placed in restraints with security and other 
hospital staff. Pt was monitored on los observation while in restraints and released to use 
bathroom and have a snack when his mood became cooperative. Pt agreed to calm behavior and 
wanted to watch football. Pt was agreeable and taken out of seclusion before dinner. 
Discussed his current medications and PRN usage with Dr ALVAREZ who will be making adjustments.



Plan: Crisis interruption and stabilization in a safe and therapeutic environment. Pt's 
county is considering conservatorship. Frequent rounding for safety.

## 2022-10-04 VITALS — SYSTOLIC BLOOD PRESSURE: 106 MMHG | DIASTOLIC BLOOD PRESSURE: 54 MMHG

## 2022-10-04 VITALS — DIASTOLIC BLOOD PRESSURE: 70 MMHG | SYSTOLIC BLOOD PRESSURE: 115 MMHG

## 2022-10-04 RX ADMIN — BENZTROPINE MESYLATE SCH MG: 1 TABLET ORAL at 07:33

## 2022-10-04 RX ADMIN — BENZTROPINE MESYLATE SCH MG: 1 TABLET ORAL at 19:29

## 2022-10-04 RX ADMIN — IBUPROFEN SCH MG: 400 TABLET, FILM COATED ORAL at 07:33

## 2022-10-04 RX ADMIN — DIVALPROEX SODIUM SCH MG: 250 TABLET, DELAYED RELEASE ORAL at 07:33

## 2022-10-04 RX ADMIN — NICOTINE POLACRILEX PRN MG: 2 LOZENGE ORAL at 07:33

## 2022-10-04 RX ADMIN — BENZTROPINE MESYLATE SCH MG: 1 TABLET ORAL at 12:47

## 2022-10-04 RX ADMIN — IBUPROFEN SCH MG: 400 TABLET, FILM COATED ORAL at 17:35

## 2022-10-04 RX ADMIN — IBUPROFEN SCH MG: 400 TABLET, FILM COATED ORAL at 12:47

## 2022-10-04 RX ADMIN — NICOTINE SCH PATCH: 21 PATCH, EXTENDED RELEASE TRANSDERMAL at 07:38

## 2022-10-04 RX ADMIN — DIVALPROEX SODIUM SCH MG: 250 TABLET, DELAYED RELEASE ORAL at 19:28

## 2022-10-04 RX ADMIN — DOCUSATE SODIUM SCH MG: 100 CAPSULE, LIQUID FILLED ORAL at 07:33

## 2022-10-04 NOTE — NUR
Nursing Progress Note      



Problem: Per 5150 pt. is here for SA by OD on street drugs after command hallucinations 
telling him to do so. Pt. states that he took "Fentanyl, black china, meth, 8 ball" in 
suicide attempt. Pt. states he feels hopeless. Pt. is also delusional, stating, "they won't 
give me my billion dollars". Pt. states, "I tried killing myself because Im a free renetta. 
They dont love me but I love them. Yes I do."



Interventions: 1:1 assessment, medication administration/education/monitoring, therapeutic 
conversation, active listening, ensured contract for safety, provided distraction, 
redirection, encouragement, positive reinforcement, and Q15 minute safety checks.



Response:  Pt was up before breakfast requesting a nicotine lozenge. Pt was cooperative with 
scheduled medications. Pt is somewhat restless at times, pt paces the hallway and socializes 
minimally with staff and peers. Pt is able to communicate his needs. No unsafe behaviors or 
verbal or physical outbursts this shift. Pt does c/o increased anxiety at times mostly about 
whether or not he is being conserved and where he will be going from here. Pt states that he 
doesn't mind conservatorship so much he just doesn't want to go to [place]. Pt states 
"that place is crazy, that place is a jungle, people having sex in the rooms and shit like 
that." Pt requested PRN Klonopin and was given Klonopin 2 mg at 1529.



Plan: Crisis interruption and stabilization in a safe and therapeutic environment. Pt's 
[facility] is considering conservatorship.

## 2022-10-04 NOTE — NUR
Nursing Progress Note      



Problem: Per 5150 pt. is here for SA by OD on street drugs after command hallucinations 
telling him to do so. Pt. states that he took "Fentanyl, black china, meth, 8 ball" in 
suicide attempt. Pt. states he feels hopeless. Pt. is also delusional, stating, "they won't 
give me my billion dollars". Pt. states, "I tried killing myself because Im a free renetta. 
They dont love me but I love them. Yes I do."



Interventions: 1:1 assessment, medication administration/education/monitoring, therapeutic 
conversation, active listening, ensured contract for safety, provided distraction, 
redirection, and positive reinforcement.



Response: Patient in community room at shift change watching football with other cohorts. 
Patient seemed at ease and was making jokes with peers. Patient did talk to CN at times 
about conservatorship. Patient reports that he was okay but worried about his mom. Patient 
began looking fidgety and restless around snack time. This nurse asked if was ready for 
evening meds and the patient stated that he was. The patient ate snack and then took his 
evening meds w/o complications. Patient went to bed shortly after.



Plan: Crisis interruption and stabilization in a safe and therapeutic environment. '[facility] is considering conservatorship. Frequent rounding for safety.

## 2022-10-05 VITALS — SYSTOLIC BLOOD PRESSURE: 132 MMHG | DIASTOLIC BLOOD PRESSURE: 55 MMHG

## 2022-10-05 VITALS — SYSTOLIC BLOOD PRESSURE: 106 MMHG | DIASTOLIC BLOOD PRESSURE: 54 MMHG

## 2022-10-05 RX ADMIN — NICOTINE POLACRILEX PRN MG: 2 LOZENGE ORAL at 18:38

## 2022-10-05 RX ADMIN — IBUPROFEN SCH MG: 400 TABLET, FILM COATED ORAL at 12:38

## 2022-10-05 RX ADMIN — BENZTROPINE MESYLATE SCH MG: 1 TABLET ORAL at 20:12

## 2022-10-05 RX ADMIN — DOCUSATE SODIUM SCH MG: 100 CAPSULE, LIQUID FILLED ORAL at 07:43

## 2022-10-05 RX ADMIN — DIVALPROEX SODIUM SCH MG: 250 TABLET, DELAYED RELEASE ORAL at 20:11

## 2022-10-05 RX ADMIN — IBUPROFEN SCH MG: 400 TABLET, FILM COATED ORAL at 07:42

## 2022-10-05 RX ADMIN — NICOTINE SCH PATCH: 21 PATCH, EXTENDED RELEASE TRANSDERMAL at 07:46

## 2022-10-05 RX ADMIN — IBUPROFEN SCH MG: 400 TABLET, FILM COATED ORAL at 17:34

## 2022-10-05 RX ADMIN — BENZTROPINE MESYLATE SCH MG: 1 TABLET ORAL at 07:43

## 2022-10-05 RX ADMIN — BENZTROPINE MESYLATE SCH MG: 1 TABLET ORAL at 12:38

## 2022-10-05 RX ADMIN — DIVALPROEX SODIUM SCH MG: 250 TABLET, DELAYED RELEASE ORAL at 07:42

## 2022-10-05 NOTE — NUR
Nursing Progress Note      



Problem: Per 5150 pt. is here for SA by OD on street drugs after command hallucinations 
telling him to do so. Pt. states that he took "Fentanyl, black china, meth, 8 ball" in 
suicide attempt. Pt. states he feels hopeless. Pt. is also delusional, stating, "they won't 
give me my billion dollars". Pt. states, "I tried killing myself because Im a free renetta. 
They dont love me but I love them. Yes I do."



Interventions: 1:1 assessment, medication administration/education/monitoring, therapeutic 
conversation, active listening, ensured contract for safety, provided distraction, 
redirection, and positive reinforcement.



Response:Patient pacing in hallway at shift change. Patient reports having a good day but he 
was having a hard time falling asleep. Patient seen going in and out of his room several 
times trying to go to sleep at 1830. Patient redirected and it was explained t the patient 
that it was to early to go to sleep. Patient walked in hallway with peers. Patient took 
evening meds w/o complications. Patient asked for his bed to be remade. Patient went to bed 
after eating snack. 



Plan: Crisis interruption and stabilization in a safe and therapeutic environment. '[facility] is considering conservatorship. Frequent rounding for safety.

## 2022-10-05 NOTE — NUR
Nursing Progress Note      



Problem: Per 5150 pt. is here for SA by OD on street drugs after command hallucinations 
telling him to do so. Pt. states that he took "Fentanyl, black china, meth, 8 ball" in 
suicide attempt. Pt. states he feels hopeless. Pt. is also delusional, stating, "they won't 
give me my billion dollars". Pt. states, "I tried killing myself because Im a free renetta. 
They dont love me but I love them. Yes I do."



Interventions: 1:1 assessment, medication administration/education/monitoring, therapeutic 
conversation, active listening, ensured contract for safety, provided distraction, 
redirection, encouragement, positive reinforcement, and Q15 minute safety checks.



Response:  Pt was up before breakfast. Pt reported that he slept "really good." He also 
reports that his nerves twitch sometimes during the night and he wants to know why. Pt 
speech was rapid/pressured and unclear at times. Pt stated that he feels normal in the 
morning before the meds. 10 minutes later pt requested his "Bipolar meds." Pt stated that he 
just wanted to get them over with. Pt was restless and pacing in the hallway around 1130. Pt 
reported, "I feel anxious but I don't want to take a Benzo." Pt reported that walking helps. 
Pt stated, "I'll be honest with you, when I was out on the streets off my meds, I would take 
meth." "It made me smell like Clorox and it burns your skin. One time they had to flush the 
poison from my eyes." Pt was anxious to speak with the  today about his 
discharge plan. Pt is thinking conservatorship might not be so bad. Pt is hopeful maybe he 
could get his life together and acquire housing when he comes [place] [facility]. Pt was 
observed smiling and socializing with staff and select peers today. Pt played ROSINA with a 
peer.



Plan: Pt in need of crisis interruption and stabilization in a safe and therapeutic 
environment. Pt's [facility] is considering conservatorship.

## 2022-10-06 VITALS — SYSTOLIC BLOOD PRESSURE: 109 MMHG | DIASTOLIC BLOOD PRESSURE: 64 MMHG

## 2022-10-06 VITALS — DIASTOLIC BLOOD PRESSURE: 78 MMHG | SYSTOLIC BLOOD PRESSURE: 128 MMHG

## 2022-10-06 RX ADMIN — IBUPROFEN SCH MG: 400 TABLET, FILM COATED ORAL at 17:34

## 2022-10-06 RX ADMIN — DOCUSATE SODIUM SCH MG: 100 CAPSULE, LIQUID FILLED ORAL at 08:20

## 2022-10-06 RX ADMIN — BENZTROPINE MESYLATE SCH MG: 1 TABLET ORAL at 19:32

## 2022-10-06 RX ADMIN — IBUPROFEN SCH MG: 400 TABLET, FILM COATED ORAL at 11:53

## 2022-10-06 RX ADMIN — BENZTROPINE MESYLATE SCH MG: 1 TABLET ORAL at 11:53

## 2022-10-06 RX ADMIN — NICOTINE SCH PATCH: 21 PATCH, EXTENDED RELEASE TRANSDERMAL at 08:21

## 2022-10-06 RX ADMIN — IBUPROFEN SCH MG: 400 TABLET, FILM COATED ORAL at 08:20

## 2022-10-06 RX ADMIN — BENZTROPINE MESYLATE SCH MG: 1 TABLET ORAL at 19:18

## 2022-10-06 RX ADMIN — NICOTINE POLACRILEX PRN MG: 2 LOZENGE ORAL at 07:43

## 2022-10-06 RX ADMIN — BENZTROPINE MESYLATE SCH MG: 1 TABLET ORAL at 08:20

## 2022-10-06 RX ADMIN — DIVALPROEX SODIUM SCH MG: 250 TABLET, DELAYED RELEASE ORAL at 08:20

## 2022-10-06 RX ADMIN — BENZTROPINE MESYLATE SCH MG: 1 TABLET ORAL at 19:28

## 2022-10-06 RX ADMIN — DIVALPROEX SODIUM SCH MG: 250 TABLET, DELAYED RELEASE ORAL at 19:16

## 2022-10-06 NOTE — NUR
Nursing Progress Note      



Problem: Per 5150 pt. is here for SA by OD on street drugs after command hallucinations 
telling him to do so. Pt. states that he took "Fentanyl, black china, meth, 8 ball" in 
suicide attempt. Pt. states he feels hopeless. Pt. is also delusional, stating, "they won't 
give me my billion dollars". Pt. states, "I tried killing myself because Im a free renetta. 
They dont love me but I love them. Yes I do."



Interventions: Provided 1:1 assessment with therapeutic communication and active listening,  
medication administration/education/monitoring, provided redirection and encouragement, 
positive reinforcement, monitored Q15 minute safety checks.



Response:  Pt up pacing the floors with headphones on most of the day. He is pleasant and 
cooperative with staff. He took his medications as prescribed and was given Nicotine 
lozenges as requested. His speech is rapid, pressured and difficult to understand at times. 
Affect bright. 



Plan: Pt in need of crisis interruption and stabilization in a safe and therapeutic 
environment. '[facility] is considering conservatorship.

## 2022-10-06 NOTE — NUR
nursing Progress Note      



Problem: Per 5150 pt. is here for SA by OD on street drugs after command hallucinations 
telling him to do so. Pt. states that he took "Fentanyl, black china, meth, 8 ball" in 
suicide attempt. Pt. states he feels hopeless. Pt. is also delusional, stating, "they won't 
give me my billion dollars". Pt. states, "I tried killing myself because Im a free renetta. 
They dont love me but I love them. Yes I do."



Interventions: 1:1 assessment, medication administration/education/monitoring, therapeutic 
conversation, active listening, ensured contract for safety, provided distraction, 
redirection, encouragement, positive reinforcement, and Q15 minute safety checks.



Response: Patient was found pacing around unit at the beginning of shift. Patient was 
observed socializing and singing with other patients. Patient continued to pace until coming 
to nurse complaining about pain in his finger and other patients. Patient requested Tylenol 
for pain in finger. Patient participated in snack before asking for night medications so he 
could go to bed. Patient slept for a short period before getting up complaining of nerve 
firing in his shoulder. Patient quickly redirected himself to once again telling stories 
about his family. 



Plan: Pt in need of crisis interruption and stabilization in a safe and therapeutic 
environment. '[facility] is considering conservatorship.

## 2022-10-07 VITALS — SYSTOLIC BLOOD PRESSURE: 107 MMHG | DIASTOLIC BLOOD PRESSURE: 56 MMHG

## 2022-10-07 VITALS — SYSTOLIC BLOOD PRESSURE: 115 MMHG | DIASTOLIC BLOOD PRESSURE: 51 MMHG

## 2022-10-07 RX ADMIN — DIVALPROEX SODIUM SCH MG: 250 TABLET, DELAYED RELEASE ORAL at 07:28

## 2022-10-07 RX ADMIN — DOCUSATE SODIUM SCH MG: 100 CAPSULE, LIQUID FILLED ORAL at 07:28

## 2022-10-07 RX ADMIN — IBUPROFEN SCH MG: 400 TABLET, FILM COATED ORAL at 07:29

## 2022-10-07 RX ADMIN — BENZTROPINE MESYLATE SCH MG: 1 TABLET ORAL at 19:49

## 2022-10-07 RX ADMIN — IBUPROFEN SCH MG: 400 TABLET, FILM COATED ORAL at 17:47

## 2022-10-07 RX ADMIN — NICOTINE POLACRILEX PRN MG: 2 LOZENGE ORAL at 17:47

## 2022-10-07 RX ADMIN — IBUPROFEN SCH MG: 400 TABLET, FILM COATED ORAL at 11:58

## 2022-10-07 RX ADMIN — BENZTROPINE MESYLATE SCH MG: 1 TABLET ORAL at 07:28

## 2022-10-07 RX ADMIN — DIVALPROEX SODIUM SCH MG: 250 TABLET, DELAYED RELEASE ORAL at 19:50

## 2022-10-07 RX ADMIN — NICOTINE SCH PATCH: 21 PATCH, EXTENDED RELEASE TRANSDERMAL at 07:29

## 2022-10-07 NOTE — NUR
Nursing Progress Note      



Problem: 

Per 5150 pt. is here for SA by OD on street drugs after command hallucinations telling him 
to do so. Pt. states that he took "Fentanyl, black china, meth, 8 ball" in suicide attempt. 
Pt. states he feels hopeless. Pt. is also delusional, stating, "They won't give me my 
billion dollars". Pt. states, "I tried killing myself because Im a free renetta. They dont 
love me but I love them. Yes I do."



Interventions: 

Provided 1:1 assessment with therapeutic communication and active listening, medication 
administration/education/monitoring, provided redirection and encouragement, positive 
reinforcement, monitored Q15 minute safety checks.



Response: 

Patient is resting quietly in bed at the start of the shift.  Cooperative with assessment 
and medications.  Paces the unit in the morning and states, Im walking a lot because Im 
trying to lose 50 pounds.  In the late morning patient complains of nausea and is noted dry 
heaving in the bathroom.  Test done to r/o covid which is negative.  Patient takes a nap in 
the afternoon.  Complains of leg pain relieved by PRN Tylenol and routine Motrin.  Denies 
any SI/ HI.  Endorses occasional AH but states, Not right now.



Plan: 

Pt in need of crisis interruption and stabilization in a safe and therapeutic environment. 
Pt's [facility] is considering conservatorship.

## 2022-10-07 NOTE — NUR
Nursing Progress Note      



Problem: Per 5150 pt. is here for SA by OD on street drugs after command hallucinations 
telling him to do so. Pt. states that he took "Fentanyl, black china, meth, 8 ball" in 
suicide attempt. Pt. states he feels hopeless. Pt. is also delusional, stating, "they won't 
give me my billion dollars". Pt. states, "I tried killing myself because Im a free renetta. 
They dont love me but I love them. Yes I do."



Interventions: Provided 1:1 assessment with therapeutic communication and active listening,  
medication administration/education/monitoring, provided redirection and encouragement, 
positive reinforcement, monitored Q15 minute safety checks.



Response: Patient was found laying down at change of shift. Patient later cam to nurse 
stating he was having hallucinations. Patient was given prn Klonopin. Patient took 
medications and began pacing halls while listening to headphones. Patient was found later 
complaining that his eyes felt stuck in a upward position , patient became upset and 
tearful.  was called and order his scheduled night medications be given early including 
an additional 1mg Cogentin and with holding his haloperidol. Patient took all medications 
without issue and returned to bed. Patient got up an hour later stating he was feeling 
better and began pacing the halls and listening to music again. 





Plan: Pt in need of crisis interruption and stabilization in a safe and therapeutic 
environment. Pt's [facility] is considering conservatorship.

## 2022-10-08 VITALS — DIASTOLIC BLOOD PRESSURE: 53 MMHG | SYSTOLIC BLOOD PRESSURE: 95 MMHG

## 2022-10-08 VITALS — SYSTOLIC BLOOD PRESSURE: 105 MMHG | DIASTOLIC BLOOD PRESSURE: 56 MMHG

## 2022-10-08 RX ADMIN — NICOTINE SCH PATCH: 21 PATCH, EXTENDED RELEASE TRANSDERMAL at 08:26

## 2022-10-08 RX ADMIN — IBUPROFEN SCH MG: 400 TABLET, FILM COATED ORAL at 16:57

## 2022-10-08 RX ADMIN — BENZTROPINE MESYLATE SCH MG: 1 TABLET ORAL at 08:24

## 2022-10-08 RX ADMIN — IBUPROFEN SCH MG: 400 TABLET, FILM COATED ORAL at 08:23

## 2022-10-08 RX ADMIN — MAGNESIUM HYDROXIDE PRN ML: 400 SUSPENSION ORAL at 08:33

## 2022-10-08 RX ADMIN — DIVALPROEX SODIUM SCH MG: 250 TABLET, DELAYED RELEASE ORAL at 20:48

## 2022-10-08 RX ADMIN — IBUPROFEN SCH MG: 400 TABLET, FILM COATED ORAL at 12:39

## 2022-10-08 RX ADMIN — NICOTINE POLACRILEX PRN MG: 2 LOZENGE ORAL at 14:41

## 2022-10-08 RX ADMIN — DOCUSATE SODIUM SCH MG: 100 CAPSULE, LIQUID FILLED ORAL at 08:23

## 2022-10-08 RX ADMIN — DIVALPROEX SODIUM SCH MG: 250 TABLET, DELAYED RELEASE ORAL at 08:24

## 2022-10-08 RX ADMIN — BENZTROPINE MESYLATE SCH MG: 1 TABLET ORAL at 20:48

## 2022-10-08 NOTE — NUR
Nursing Progress Note:



Problem : Per 5150 pt. is here for SA by OD on street drugs after command hallucinations 
telling him to do so. Pt. states that he took "Fentanyl, black china, meth, 8 ball" in 
suicide attempt. Pt. states he feels hopeless. Pt. is also delusional, stating, "they won't 
give me my billion dollars". Pt. states, "I tried killing myself because Im a free renetta. 
They dont love me but I love them. Yes I do."



Interventions : Introduced self and established rapport, maintained a safe and supportive 
environment, ensured contract for safety, provided clear and simple instructions, attempted 
to orient to reality, monitored behavior and provided intervention as needed, and maintained 
Q 15min safety checks. 



Response : Received pt. sleeping in bed at the beginning of the shift, he awoke for 
breakfast and afterwards returned back to bed. 1:1 was completed at bedside, pt. presents as 
cooperative, restless, guarded, and withdrawn. His speech is pressured and difficult to 
understand, and he responds minimally to direct questions. Pt. denies any S/I, however 
admits to some ongoing depression and anxiety. When questioned by this writer regarding the 
cause, pt. stated, "They want to put me on conservatorship." Pt. also endorsed A/HA, and 
upon further questioning stated in a disorganized manner, "I see three thirty three." Pt. 
isolated in his room throughout the morning napping intermittently. 

In the afternoon, pt. was placed on a 5270 hold and he reported increased depression and 
anxiety r/t this. Pt. was observed to be pacing in the hallway with a constricted affect. He 
stated, "I think I'm in the great depression. I thought it was only five more days, now I'm 
going to be here forever." This writer offered active listening and positive encouragement, 
and pt. reported some contentment. PRN Clonazepam was administered per pt. request, and will 
continue to monitor closely.  



Plan : Per Dr. Davalos, pt. continues to require a safe and supportive environment. 
Conservatorship is recommended. 


-------------------------------------------------------------------------------

Addendum: 10/08/22 at 1748 by Imani Gonzalez RN

-------------------------------------------------------------------------------

Pt. became slightly agitated r/t his 5270 mental health hold, and stated, "I can't stay here 
for twenty eight more days!" Active listening and positive encouragement was provided along 
with MILANA Sierra, will continue to monitor pt. closely.

## 2022-10-08 NOTE — NUR
Nursing Progress Note      



Problem: 

Per 5150 pt. is here for SA by OD on street drugs after command hallucinations telling him 
to do so. Pt. states that he took "Fentanyl, black china, meth, 8 ball" in suicide attempt. 
Pt. states he feels hopeless. Pt. is also delusional, stating, "They won't give me my 
billion dollars". Pt. states, "I tried killing myself because Im a free renetta. They dont 
love me but I love them. Yes I do."



Interventions: 

Provided 1:1 assessment with therapeutic communication and active listening, medication 
administration/education/monitoring, provided redirection and encouragement, positive 
reinforcement, monitored Q15 minute safety checks.



Response: Received patient laying in bed. Patient complained of feeling sick. Patient stated 
he ad a headache had was nauseous. Patient was given chicken broth and retuned to bed. 
Patient spent majority of shift in bed except for coming out to take night medications and 
participating in snack time. Patient was offered pain medication for headache and pt 
refused. 



Plan: 

Pt in need of crisis interruption and stabilization in a safe and therapeutic environment. 
'[facility] is considering conservatorship.

## 2022-10-08 NOTE — NUR
Nursing Progress Note      



Problem: 

Per 5150 pt. is here for SA by OD on street drugs after command hallucinations telling him 
to do so. Pt. states that he took "Fentanyl, black china, meth, 8 ball" in suicide attempt. 
Pt. states he feels hopeless. Pt. is also delusional, stating, "They won't give me my 
billion dollars". Pt. states, "I tried killing myself because Im a free renetta. They dont 
love me but I love them. Yes I do."



Interventions: 

Provided 1:1 assessment with therapeutic communication and active listening, medication 
administration/education/monitoring, provided redirection and encouragement, positive 
reinforcement, monitored Q15 minute safety checks.



Response: Pt made statements to staff about how upset he was that he got put on a 5270 and 
doesn't want to be conserved. He remained calm but with a depressed affect. He isolated to 
his room the entire shift and mainly slept. He took his HS medications with out issue. 



Plan: 

Pt in need of crisis interruption and stabilization in a safe and therapeutic environment. 
[facility]

## 2022-10-09 VITALS — SYSTOLIC BLOOD PRESSURE: 134 MMHG | DIASTOLIC BLOOD PRESSURE: 59 MMHG

## 2022-10-09 VITALS — DIASTOLIC BLOOD PRESSURE: 62 MMHG | SYSTOLIC BLOOD PRESSURE: 98 MMHG

## 2022-10-09 RX ADMIN — IBUPROFEN SCH MG: 400 TABLET, FILM COATED ORAL at 16:52

## 2022-10-09 RX ADMIN — BENZTROPINE MESYLATE SCH MG: 1 TABLET ORAL at 20:14

## 2022-10-09 RX ADMIN — MAGNESIUM HYDROXIDE PRN ML: 400 SUSPENSION ORAL at 13:37

## 2022-10-09 RX ADMIN — NICOTINE POLACRILEX PRN MG: 2 LOZENGE ORAL at 11:50

## 2022-10-09 RX ADMIN — IBUPROFEN SCH MG: 400 TABLET, FILM COATED ORAL at 08:02

## 2022-10-09 RX ADMIN — DOCUSATE SODIUM SCH MG: 100 CAPSULE, LIQUID FILLED ORAL at 08:02

## 2022-10-09 RX ADMIN — BENZTROPINE MESYLATE SCH MG: 1 TABLET ORAL at 08:02

## 2022-10-09 RX ADMIN — DIVALPROEX SODIUM SCH MG: 250 TABLET, DELAYED RELEASE ORAL at 20:13

## 2022-10-09 RX ADMIN — NICOTINE SCH PATCH: 21 PATCH, EXTENDED RELEASE TRANSDERMAL at 08:04

## 2022-10-09 RX ADMIN — NICOTINE POLACRILEX PRN MG: 2 LOZENGE ORAL at 08:32

## 2022-10-09 RX ADMIN — IBUPROFEN SCH MG: 400 TABLET, FILM COATED ORAL at 11:50

## 2022-10-09 RX ADMIN — DIVALPROEX SODIUM SCH MG: 250 TABLET, DELAYED RELEASE ORAL at 08:02

## 2022-10-09 NOTE — NUR
Nursing Progress Note:



Problem : Per 5150 pt. is here for SA by OD on street drugs after command hallucinations 
telling him to do so. Pt. states that he took "Fentanyl, black china, meth, 8 ball" in 
suicide attempt. Pt. states he feels hopeless. Pt. is also delusional, stating, "they won't 
give me my billion dollars". Pt. states, "I tried killing myself because Im a free renetta. 
They dont love me but I love them. Yes I do."



Interventions : Maintained a safe and supportive environment, ensured contract for safety, 
provided clear and simple instructions, attempted to orient to reality, monitored behavior 
and provided intervention as needed, provided active listening and positive encouragement, 
encouraged independent performance of ADLs,and maintained Q 15min safety checks. 



Response : Received pt. sleeping in bed at the beginning of the shift, he awoke for 
breakfast and afterwards returned back to bed as is his routine. 1:1 was completed at 
bedside, pt. continues to present as restless and guarded. His speech remains pressured and 
difficult to understand and his thought process is somewhat tangental. Pt's affect is 
constricted and he reports ongoing depression and anxiety r/t his mental health hold. He 
also endorses some concern about gaining weight from his medications and reports he recently 
lost two-hundred pounds (diet was changed to vegetarian per pt. request). Pt. also endorses 
some ongoing A/HA, but does not appear to be actively responding to internal stimuli. He 
continues on to talk in a disorganized manner about place he's lived, substance abuse 
issued, and different family members.

 Pt. again naps intermittently during the day and c/o some somatic s/s including left foot 
and bilateral wrist pain, PRN Tylenol was administered along with scheduled Motrin with 
effectiveness. He was encouraged to change his linens which appeared to be full of crumbs, 
and required assistance from staff in doing so. At approximately 1400, pt. exhibited 
increased anxiety stating, "I" feel like I will be stuck here forever!" He was provided with 
positive encouragement and a quiet environment and was able to nap. However, in the evening 
pt. again exhibited increased anxiety and agitation and PRN Haldol was administered without 
effectiveness (see next note). 



Plan : Per Dr. Davalos, pt. continues to require a safe and supportive environment. 
Conservatorship is recommended.

## 2022-10-09 NOTE — NUR
PRNs Administered: Ativan 1mg, Haldol 5mg, and Benadryl 100mg. Pt. was observed to be 
increasingly anxious and agitated AEB pacing the unit, clinching his fists, and scowling. 



Interventions Offered: This writer attempted to provide active listening and positive 
encouragement to pt, however he retreated to his room slamming the door. He yelled out, "I 
can't handle this anymore!" Pt. then requested medications to help him relax and this was 
endorsed to Dr. Davalos. 



Response to Medication: Pt. remained in his room and accepted ordered medications, he 
thanked staff and stated, "God bless you." Will continue to monitor closely.

## 2022-10-10 VITALS — DIASTOLIC BLOOD PRESSURE: 62 MMHG | SYSTOLIC BLOOD PRESSURE: 110 MMHG

## 2022-10-10 VITALS — DIASTOLIC BLOOD PRESSURE: 54 MMHG | SYSTOLIC BLOOD PRESSURE: 98 MMHG

## 2022-10-10 RX ADMIN — IBUPROFEN SCH MG: 400 TABLET, FILM COATED ORAL at 08:03

## 2022-10-10 RX ADMIN — NICOTINE SCH PATCH: 21 PATCH, EXTENDED RELEASE TRANSDERMAL at 08:04

## 2022-10-10 RX ADMIN — DIVALPROEX SODIUM SCH MG: 250 TABLET, DELAYED RELEASE ORAL at 08:02

## 2022-10-10 RX ADMIN — DIVALPROEX SODIUM SCH MG: 250 TABLET, DELAYED RELEASE ORAL at 20:34

## 2022-10-10 RX ADMIN — BENZTROPINE MESYLATE SCH MG: 1 TABLET ORAL at 08:03

## 2022-10-10 RX ADMIN — DOCUSATE SODIUM SCH MG: 100 CAPSULE, LIQUID FILLED ORAL at 08:03

## 2022-10-10 RX ADMIN — BENZTROPINE MESYLATE SCH MG: 1 TABLET ORAL at 20:33

## 2022-10-10 RX ADMIN — IBUPROFEN SCH MG: 400 TABLET, FILM COATED ORAL at 17:57

## 2022-10-10 RX ADMIN — IBUPROFEN SCH MG: 400 TABLET, FILM COATED ORAL at 12:26

## 2022-10-10 NOTE — NUR
NURSING PROGRESS NOTE



Problem : Per 5150 pt. is here for SA by OD on street drugs after command hallucinations 
telling him to do so. Pt. states that he took "Fentanyl, black china, meth, 8 ball" in 
suicide attempt. Pt. states he feels hopeless. Pt. is also delusional, stating, "they won't 
give me my billion dollars". Pt. states, "I tried killing myself because Im a free renetta. 
They dont love me but I love them. Yes I do."



Interventions :  Maintained a safe and supportive environment, ensured contract for safety, 
provided clear and simple instructions, attempted to orient to reality, monitored behavior 
and provided intervention as needed, provided active listening and positive encouragement, 
encouraged independent performance of ADLs, and maintained Q 15min safety checks. 



Response : Received patient sleeping at shift change. Pt was given PRN Clonazepam last 
night. Pt slept 7.25 house per sleep assessment.  Pt was cooperative with care and AM 
medication. Pt finished his breakfast then went back to sleep. Pt presented calm and when 
asked how he was states the medicine is working. My depression is getting better, its 
going away. Pts speech is difficult to understand at times r/t mumbling. Pt later came to 
writer I am going to fight my conservatorship, I am tired of being here. Pt became more 
anxious throughout the morning saying at one point I dont even know why I am taking my 
medication. Pt repeating I am tired of being here. What good is it taking my 
medication? Write reminded pt what he said earlier about his medication helping. Pt took 
noon meds without issue, PRN Haldol 5mg was administered at same time. Pt handled court 
hearing without incident. Pt requested Tylenol for bilateral leg pain then spent the 
afternoon in his room. 



Plan :  Patients mood continues to be labile and requires medication adjustments in a safe 
and supportive environment. Pts conservatorship is still going forward. 


-------------------------------------------------------------------------------

Addendum: 10/10/22 at 1802 by Amy Baker RN

-------------------------------------------------------------------------------

Administered pt's 1700 medication without issue. Pt asked what they were then took them. Pt 
reports "this medicine makes me sleepy."

## 2022-10-10 NOTE — NUR
Nursing Progress Note:



Problem : Per 5150 pt. is here for SA by OD on street drugs after command hallucinations 
telling him to do so. Pt. states that he took "Fentanyl, black china, meth, 8 ball" in 
suicide attempt. Pt. states he feels hopeless. Pt. is also delusional, stating, "they won't 
give me my billion dollars". Pt. states, "I tried killing myself because Im a free renetta. 
They dont love me but I love them. Yes I do."



Interventions : Maintained a safe and supportive environment, ensured contract for safety, 
provided clear and simple instructions, attempted to orient to reality, monitored behavior 
and provided intervention as needed, provided active listening and positive encouragement, 
encouraged independent performance of ADLs,and maintained Q 15min safety checks. 



Response : Received pt. pacing on the unit at the beginning of the shift. Pt. continues to 
present as somewhat restless and his speech remains pressured and difficult to understand. 
However, pt's affect is brighter this shift and he states, "I'm just going to go through 
with conservatorship. They can help me get an apartment and I can get my mom off the 
reservation." Pt showered this shift.

Pt. attended snack and requested his HS medications afterwards. However, he had difficulty 
falling asleep r/t reports of ongoing anxiety, and PRN Clonazepam was administered with 
effectiveness. 



Plan : Per Dr. Davalos, pt. continues to require a safe and supportive environment. 
Conservatorship is recommended.

## 2022-10-11 VITALS — DIASTOLIC BLOOD PRESSURE: 45 MMHG | SYSTOLIC BLOOD PRESSURE: 100 MMHG

## 2022-10-11 VITALS — DIASTOLIC BLOOD PRESSURE: 66 MMHG | SYSTOLIC BLOOD PRESSURE: 117 MMHG

## 2022-10-11 RX ADMIN — IBUPROFEN SCH MG: 400 TABLET, FILM COATED ORAL at 07:25

## 2022-10-11 RX ADMIN — DIVALPROEX SODIUM SCH MG: 250 TABLET, DELAYED RELEASE ORAL at 19:04

## 2022-10-11 RX ADMIN — DIVALPROEX SODIUM SCH MG: 250 TABLET, DELAYED RELEASE ORAL at 07:25

## 2022-10-11 RX ADMIN — IBUPROFEN SCH MG: 400 TABLET, FILM COATED ORAL at 12:01

## 2022-10-11 RX ADMIN — BENZTROPINE MESYLATE SCH MG: 1 TABLET ORAL at 07:24

## 2022-10-11 RX ADMIN — IBUPROFEN SCH MG: 400 TABLET, FILM COATED ORAL at 16:41

## 2022-10-11 RX ADMIN — NICOTINE POLACRILEX PRN MG: 2 LOZENGE ORAL at 11:34

## 2022-10-11 RX ADMIN — NICOTINE SCH PATCH: 21 PATCH, EXTENDED RELEASE TRANSDERMAL at 07:25

## 2022-10-11 RX ADMIN — DOCUSATE SODIUM SCH MG: 100 CAPSULE, LIQUID FILLED ORAL at 07:24

## 2022-10-11 RX ADMIN — BENZTROPINE MESYLATE SCH MG: 1 TABLET ORAL at 19:04

## 2022-10-11 NOTE — NUR
CONSERVATORSHIP PROCESS, PLACEMENT

10/14/2022 is the estimate for a temporary conservatorship to be in place, per phone call to 
[facility] Public Guardian Linda Lord [phone]. Ms. Lord has known Mr. Quintero for many years, has assisted with multiple placements, and will be in touch ASAP. 
Provided [facility] main number [phone] to facilitate contact.

## 2022-10-11 NOTE — NUR
NURSING PROGRESS NOTE



Problem: Per 5150 pt. is here for SA by OD on street drugs after command hallucinations 
telling him to do so. Pt. states that he took "Fentanyl, black china, meth, 8 ball" in 
suicide attempt. Pt. states he feels hopeless. Pt. is also delusional, stating, "they won't 
give me my billion dollars". Pt. states, "I tried killing myself because Im a free renetta. 
They dont love me but I love them. Yes I do."



Interventions:  Maintained a safe and supportive environment, ensured contract for safety, 
provided clear and simple instructions, attempted to orient to reality, monitored behavior 
and provided intervention as needed, provided active listening and positive encouragement, 
encouraged independent performance of ADLs, and maintained Q 15min safety checks. 



Response: Patient was up at approximately 0630 ambulating in the hallway waiting for 
breakfast.  Requested hot chocolate.  Ambulating frequently in the hallway with eyes 
squinted and definite grimace on face at all times.  Patient made a phone call to his mom 
this morning and stated I am not going to call her ever again.  All she wants is money.  
She is all about poverty.  She owes me over $600, and she cant ever say Thank you or pay me 
back and Im really tired of it.  Patient given Klonopin for anxiety with some relief.  
Patient continued to walk to the hallways, watched some TV.  Patient received Haldol and 
Ativan at 1200 secondary to increasing anxiety per Dr. PETERSON.  At approximately 1455, patient 
was ambulating in the singh and started yelling I am sick of this fing place.  There is 
nothing to do here.  I am bored and tired and its been 5 days and I am not better on my 
medications.  Dr. PETERSON was located nearby as well as Sherley, and this Writer who asked the 
patient to come into his room so that we can give him an update on his current status as 
well as discuss his part in improving as well as being patient with the Process.  Sherley stated 
to the patient that [employer] and another person in [place] have received his 
paperwork, and expect to be able to send referrals out by Friday or next Monday, to get the 
patient placed near his 13 yo son in [facility].  Patient appeared appreciative knowing 
this information.  This Writer also discussed with the patient, Sherley & Dr. PETERSON present that he 
needs to understand that some things take time.  We have no idea how long the court will 
take to process this information, but he needs to start meeting [facility] to improve his time 
here, let us know what he needs, what he would like to do, and we will make every attempt 
for patio time on a daily basis and find interests that best suit him.  Sherley will assist in 
this planning also. Patient received Seroquel at 1455 and it appeared to calm him down for 
the rest of the afternoon.  



Plan:  Patients mood continues to be labile and requires medication adjustments in a safe 
and supportive environment. Pts conservatorship is still going forward.  Keep patient 
informed of daily report on any/all updates on the conserved process so that patient can 
remain intact on what is being done on his behalf.

## 2022-10-11 NOTE — NUR
NURSING PROGRESS NOTE



Problem : Per 5150 pt. is here for SA by OD on street drugs after command hallucinations 
telling him to do so. Pt. states that he took "Fentanyl, black china, meth, 8 ball" in 
suicide attempt. Pt. states he feels hopeless. Pt. is also delusional, stating, "they won't 
give me my billion dollars". Pt. states, "I tried killing myself because Im a free renetta. 
They dont love me but I love them. Yes I do."



Interventions :  Maintained a safe and supportive environment, ensured contract for safety, 
provided clear and simple instructions, attempted to orient to reality, monitored behavior 
and provided intervention as needed, provided active listening and positive encouragement, 
encouraged independent performance of ADLs, and maintained Q 15min safety checks. 



Response : Pt spent entire shift in bed resting.  When I asked him how he was doing he 
stated "Not good."  He continues to state that he is not happy with being here. He denies 
S/I, H/I, A/V hallucinations, no delusional statements were made.  Affect was flat, hygiene 
fair.  Continues to have poor insight into his reason for being here.  Took his night time 
meds without problems and denies any side effects other than "making me sleepy."  Discussed 
ways to control his anger when he starts to feel frustrated.  He verbalized understanding 
and stated he will try to work on it. 



Plan :  Patients mood continues to be labile and requires medication adjustments in a safe 
and supportive environment. Pts conservatorship is still going forward.

## 2022-10-11 NOTE — NUR
Reassessment: Per nursing documentation diet was changed to vegetarian per pt request. Pt 
continues to receive double protein TID, eating 100% of meals meeting est needs at this 
time. [facility] 10/9 receiving routine and PRN bowel care. No change to recommendations at this 
time, will continue to monitor.

Recommendations:

1) Continue Vegetarian diet per pt request

2) Double eggs WB, double meat BIDLD

3) Bowel care PRN

4) Weekly scaled weights

-------------------------------------------------------------------------------

Addendum: 10/11/22 at 0719 by Loc Villalba RD

-------------------------------------------------------------------------------

Amended: Links added.

## 2022-10-12 VITALS — SYSTOLIC BLOOD PRESSURE: 100 MMHG | DIASTOLIC BLOOD PRESSURE: 64 MMHG

## 2022-10-12 VITALS — SYSTOLIC BLOOD PRESSURE: 90 MMHG | DIASTOLIC BLOOD PRESSURE: 62 MMHG

## 2022-10-12 RX ADMIN — DIVALPROEX SODIUM SCH MG: 250 TABLET, DELAYED RELEASE ORAL at 20:56

## 2022-10-12 RX ADMIN — IBUPROFEN SCH MG: 400 TABLET, FILM COATED ORAL at 11:50

## 2022-10-12 RX ADMIN — BENZTROPINE MESYLATE SCH MG: 1 TABLET ORAL at 20:55

## 2022-10-12 RX ADMIN — NICOTINE SCH PATCH: 21 PATCH, EXTENDED RELEASE TRANSDERMAL at 08:13

## 2022-10-12 RX ADMIN — DIVALPROEX SODIUM SCH MG: 250 TABLET, DELAYED RELEASE ORAL at 08:12

## 2022-10-12 RX ADMIN — IBUPROFEN SCH MG: 400 TABLET, FILM COATED ORAL at 16:12

## 2022-10-12 RX ADMIN — BENZTROPINE MESYLATE SCH MG: 1 TABLET ORAL at 08:13

## 2022-10-12 RX ADMIN — DOCUSATE SODIUM SCH MG: 100 CAPSULE, LIQUID FILLED ORAL at 08:13

## 2022-10-12 RX ADMIN — IBUPROFEN SCH MG: 400 TABLET, FILM COATED ORAL at 08:14

## 2022-10-12 NOTE — NUR
Nursing Progress Note: Mike



Problem : 

Per 5150 pt. is here for SA by OD on street drugs after command hallucinations telling him 
to do so. Pt. states that he took "Fentanyl, black china, meth, 8 ball" in suicide attempt. 
Pt. states he feels hopeless. Pt. is also delusional, stating, "they won't give me my 
billion dollars". Pt. states, "I tried killing myself because Im a free renetta. They dont 
love me but I love them. Yes I do."



Interventions : 

Maintained a safe and supportive environment, ensured contract for safety, provided clear 
and simple instructions, attempted to orient to reality, monitored behavior and provided 
intervention as needed, provided active listening and positive encouragement, encouraged 
independent performance of ADLs, and maintained Q 15min safety checks. 



Response : 

Received pt. awake pacing near his door. Pt. approached writer and requested his meds early. 
He denies SI, HI, A/VH he was receptive to taking his medications. Pt. appears to have a 
slow gait, but has had no outburst of aggressive behaviors presented. Pt. approached later 
mid-day c/o needing some medication for anxiety, it was a routine med time and that was 
sufficient in aiding his anxiety. Pt. has attended all meals in the main dining room, he 
doesnt engage other cohorts. Pt. was given PRN Klonopin  d/t s/sx of agitation he was 
fixating on gaining weight because of medications, and I just want to get out of here Pt. 
has good hygiene and is wearing unit scrubs. 



Plan :

 Per Dr. Davalos, pt. continues to require a safe and supportive environment. 
Conservatorship is recommended.

## 2022-10-12 NOTE — NUR
NURSING PROGRESS NOTE



Problem: Per 5150 pt. is here for SA by OD on street drugs after command hallucinations 
telling him to do so. Pt. states that he took "Fentanyl, black china, meth, 8 ball" in 
suicide attempt. Pt. states he feels hopeless. Pt. is also delusional, stating, "they won't 
give me my billion dollars". Pt. states, "I tried killing myself because Im a free renetta. 
They dont love me but I love them. Yes I do."



Interventions:  Maintained a safe and supportive environment, ensured contract for safety, 
provided clear and simple instructions, attempted to orient to reality, monitored behavior 
and provided intervention as needed, provided active listening and positive encouragement, 
encouraged independent performance of ADLs, and maintained Q 15min safety checks. 



Response: Patient met this nurse in the hallway at shift change. Patient reported feeling 
anxious. This nurse explained to the patient that it was to soon for any medications at this 
time but to wait till 1900 and he could take his evening meds earlier. Patient given evening 
meds. Patient went to lay down in bed. Patient given a sandwich and chips. Patient woke up 
at 20:00 stating that his anxiety was still bad and he was experiencing a sensation in his 
body like electricity at times. This nurse was able to calmly talk to the patient and give 
the patient PRN Klonopin 2mg for anxiety. Patient was able to go back to his room and lay 
down. The patient was able to fall asleep shortly after.



Plan:  Patients mood continues to be labile and requires medication adjustments in a safe 
and supportive environment. Pts conservatorship is still going forward.  Keep patient 
informed of daily report on any/all updates on the conserved process so that patient can 
remain intact on what is being done on his behalf.

## 2022-10-13 VITALS — DIASTOLIC BLOOD PRESSURE: 70 MMHG | SYSTOLIC BLOOD PRESSURE: 120 MMHG

## 2022-10-13 VITALS — SYSTOLIC BLOOD PRESSURE: 101 MMHG | DIASTOLIC BLOOD PRESSURE: 55 MMHG

## 2022-10-13 RX ADMIN — IBUPROFEN SCH MG: 400 TABLET, FILM COATED ORAL at 07:20

## 2022-10-13 RX ADMIN — DIVALPROEX SODIUM SCH MG: 250 TABLET, DELAYED RELEASE ORAL at 07:21

## 2022-10-13 RX ADMIN — BENZTROPINE MESYLATE SCH MG: 1 TABLET ORAL at 07:21

## 2022-10-13 RX ADMIN — NICOTINE POLACRILEX PRN MG: 2 LOZENGE ORAL at 12:37

## 2022-10-13 RX ADMIN — NICOTINE SCH PATCH: 21 PATCH, EXTENDED RELEASE TRANSDERMAL at 07:22

## 2022-10-13 RX ADMIN — BENZTROPINE MESYLATE SCH MG: 1 TABLET ORAL at 20:03

## 2022-10-13 RX ADMIN — DOCUSATE SODIUM SCH MG: 100 CAPSULE, LIQUID FILLED ORAL at 07:20

## 2022-10-13 RX ADMIN — IBUPROFEN SCH MG: 400 TABLET, FILM COATED ORAL at 11:44

## 2022-10-13 RX ADMIN — DIVALPROEX SODIUM SCH MG: 250 TABLET, DELAYED RELEASE ORAL at 20:05

## 2022-10-13 RX ADMIN — IBUPROFEN SCH MG: 400 TABLET, FILM COATED ORAL at 16:44

## 2022-10-13 NOTE — NUR
Nursing Progress Note: Mike



Problem : 

Per 5150 pt. is here for SA by OD on street drugs after command hallucinations telling him 
to do so. Pt. states that he took "Fentanyl, black china, meth, 8 ball" in suicide attempt. 
Pt. states he feels hopeless. Pt. is also delusional, stating, "they won't give me my 
billion dollars". Pt. states, "I tried killing myself because Im a free renetta. They dont 
love me but I love them. Yes I do."



Interventions : 

Maintained a safe and supportive environment, ensured contract for safety, provided clear 
and simple instructions, attempted to orient to reality, monitored behavior and provided 
intervention as needed, provided active listening and positive encouragement, encouraged 
independent performance of ADLs, and maintained Q 15min safety checks. 



Response : 

Received pt. awake pacing unit socializing with other patients. Patient was later found 
sitting in community room watching tv. Patient asked about changes to his medications and 
about his length of stay. Patient participated in snack and went to bed. Nurse woke up 
patient to give night medications which patient took without difficulty and returned to bed.



Plan :

 Per Dr. Davalos, pt. continues to require a safe and supportive environment. 
Conservatorship is recommended.

## 2022-10-13 NOTE — NUR
Nursing Progress Note: Mike



Problem : 

Per 5150 pt. is here for SA by OD on street drugs after command hallucinations telling him 
to do so. Pt. states that he took "Fentanyl, black china, meth, 8 ball" in suicide attempt. 
Pt. states he feels hopeless. Pt. is also delusional, stating, "they won't give me my 
billion dollars". Pt. states, "I tried killing myself because Im a free renetta. They dont 
love me but I love them. Yes I do."



Interventions : 

Maintained a safe and supportive environment, ensured contract for safety, provided clear 
and simple instructions, attempted to orient to reality, monitored behavior and provided 
intervention as needed, provided active listening and positive encouragement, encouraged 
independent performance of ADLs, and maintained Q 15min safety checks. 



Response : 

Received pt. asleep, he woke to attend breakfast. He was calm during assessment and denies 
SI, HI, A/VH. Pt. approached writer later in the morning c/o feeling anxious and 
immediately began to reiterate his feelings to DC and presents as irritable while discussing 
his feelings of unjustly being held; PRN Klonopin given. Pt. returned to his room and calmly 
discussed his plan to maintain his feelings. Pt. has a flat affect, his hygiene is fair and 
is wearing unit scrubs. Pt. ate all his meals in the main dining room with cohorts, and was 
observed socializing with cohorts at times. His mood was down casted   throughout the rest 
of shift, and did not present as hostile. Pt. c/o Lt. foot pain and received PRN Tylenol and 
a nicotine lozenge was requested as well. 

Pt. weekly weight was 120.0kg



Plan :

 Per Dr. Davalos, pt. continues to require a safe and supportive environment. 
Conservatorship is recommended.

## 2022-10-14 VITALS — SYSTOLIC BLOOD PRESSURE: 120 MMHG | DIASTOLIC BLOOD PRESSURE: 77 MMHG

## 2022-10-14 VITALS — DIASTOLIC BLOOD PRESSURE: 72 MMHG | SYSTOLIC BLOOD PRESSURE: 117 MMHG

## 2022-10-14 RX ADMIN — IBUPROFEN SCH MG: 400 TABLET, FILM COATED ORAL at 12:31

## 2022-10-14 RX ADMIN — IBUPROFEN SCH MG: 400 TABLET, FILM COATED ORAL at 16:10

## 2022-10-14 RX ADMIN — DIVALPROEX SODIUM SCH MG: 250 TABLET, DELAYED RELEASE ORAL at 20:03

## 2022-10-14 RX ADMIN — NICOTINE POLACRILEX PRN MG: 2 LOZENGE ORAL at 18:53

## 2022-10-14 RX ADMIN — IBUPROFEN SCH MG: 400 TABLET, FILM COATED ORAL at 07:47

## 2022-10-14 RX ADMIN — BENZTROPINE MESYLATE SCH MG: 1 TABLET ORAL at 20:04

## 2022-10-14 RX ADMIN — DIVALPROEX SODIUM SCH MG: 250 TABLET, DELAYED RELEASE ORAL at 07:46

## 2022-10-14 RX ADMIN — DOCUSATE SODIUM SCH MG: 100 CAPSULE, LIQUID FILLED ORAL at 07:47

## 2022-10-14 RX ADMIN — NICOTINE SCH PATCH: 21 PATCH, EXTENDED RELEASE TRANSDERMAL at 07:47

## 2022-10-14 RX ADMIN — BENZTROPINE MESYLATE SCH MG: 1 TABLET ORAL at 07:47

## 2022-10-14 NOTE — NUR
Nursing Progress Note: 



Problem : 

Per 5150 pt. is here for SA by OD on street drugs after command hallucinations telling him 
to do so. Pt. states that he took "Fentanyl, black china, meth, 8 ball" in suicide attempt. 
Pt. states he feels hopeless. Pt. is also delusional, stating, "they won't give me my 
billion dollars". Pt. states, "I tried killing myself because Im a free renetta. They dont 
love me but I love them. Yes I do."



Interventions : 

Maintained a safe and supportive environment, ensured contract for safety, provided clear 
and simple instructions, attempted to orient to reality, monitored behavior and provided 
intervention as needed, provided active listening and positive encouragement, encouraged 
independent performance of ADLs, and maintained Q 15min safety checks. 



Response:



Patient awoke shortly after shift change.  Patient has been anxious for most of the day 
because he called his Conservator, Amy, and she evidently informed him that he may go to 
[facility].  Patient does not want to go there and reports bad experiences there. 
Patient spent time pacing the hallways and talking to different staff of his dilemma of 
going to [place].  Patient complains of left ankle and hip pain and was provided with 
routine Motrin, as well as prn Tylenol.  Patient states that he is taking too much Depakote, 
and it needs to be reduced, but reinforced that he has been calm and cooperative with it.  
Patient lying down in bed at this time.







Plan :

Per Dr. Davalos, pt. continues to require a safe and supportive environment. Patient is 
conserved and is awaiting placement.

## 2022-10-14 NOTE — NUR
Nursing Progress Note: Mike



Problem : 

Per 5150 pt. is here for SA by OD on street drugs after command hallucinations telling him 
to do so. Pt. states that he took "Fentanyl, black china, meth, 8 ball" in suicide attempt. 
Pt. states he feels hopeless. Pt. is also delusional, stating, "they won't give me my 
billion dollars". Pt. states, "I tried killing myself because Im a free renetta. They dont 
love me but I love them. Yes I do."



Interventions : 

Maintained a safe and supportive environment, ensured contract for safety, provided clear 
and simple instructions, attempted to orient to reality, monitored behavior and provided 
intervention as needed, provided active listening and positive encouragement, encouraged 
independent performance of ADLs, and maintained Q 15min safety checks. 



Response : Patient was observed sleeping at Change of shift. Patient requested prn Tylenol 
for headache and returned to bed. Patient was given Tylenol and chicken broth. Patient 
stayed in bed until snack time. Patient complained about ho much weight he has gained since 
being here. Patient ate snack and went back to room. Patient took all night medications 
without issue and went to bed.



Plan :

Per Dr. Davalos, pt. continues to require a safe and supportive environment. 
Conservatorship is recommended.

## 2022-10-15 VITALS — DIASTOLIC BLOOD PRESSURE: 70 MMHG | SYSTOLIC BLOOD PRESSURE: 113 MMHG

## 2022-10-15 VITALS — DIASTOLIC BLOOD PRESSURE: 62 MMHG | SYSTOLIC BLOOD PRESSURE: 108 MMHG

## 2022-10-15 RX ADMIN — IBUPROFEN SCH MG: 400 TABLET, FILM COATED ORAL at 16:32

## 2022-10-15 RX ADMIN — BENZTROPINE MESYLATE SCH MG: 1 TABLET ORAL at 08:18

## 2022-10-15 RX ADMIN — DIVALPROEX SODIUM SCH MG: 250 TABLET, DELAYED RELEASE ORAL at 20:20

## 2022-10-15 RX ADMIN — IBUPROFEN SCH MG: 400 TABLET, FILM COATED ORAL at 12:05

## 2022-10-15 RX ADMIN — IBUPROFEN SCH MG: 400 TABLET, FILM COATED ORAL at 08:18

## 2022-10-15 RX ADMIN — MAGNESIUM HYDROXIDE PRN ML: 400 SUSPENSION ORAL at 15:52

## 2022-10-15 RX ADMIN — NICOTINE POLACRILEX PRN MG: 2 LOZENGE ORAL at 13:29

## 2022-10-15 RX ADMIN — NICOTINE SCH PATCH: 21 PATCH, EXTENDED RELEASE TRANSDERMAL at 08:18

## 2022-10-15 RX ADMIN — DOCUSATE SODIUM SCH MG: 100 CAPSULE, LIQUID FILLED ORAL at 08:18

## 2022-10-15 RX ADMIN — BENZTROPINE MESYLATE SCH MG: 1 TABLET ORAL at 20:20

## 2022-10-15 RX ADMIN — DIVALPROEX SODIUM SCH MG: 250 TABLET, DELAYED RELEASE ORAL at 08:19
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
## 2022-10-15 NOTE — NUR
Nursing Progress Note



Problem: 

Per 5150 pt. is here for SA by OD on street drugs after command hallucinations telling him 
to do so. Pt. states that he took "Fentanyl, black china, meth, 8 ball" in suicide attempt. 
Pt. states he feels hopeless. Pt. is also delusional, stating, "they won't give me my 
billion dollars". Pt. states, "I tried killing myself because Im a free renetta. They dont 
love me but I love them. Yes I do."



Interventions: 



Maintained a safe and supportive environment, ensured contract for safety, provided clear 
and simple instructions, attempted to orient to reality, monitored behavior and provided 
intervention as needed, provided active listening and positive encouragement, encouraged 
independent performance of ADLs, and maintained Q 15min safety checks. 



Response:



Patient apologized for behavior today, and told RN that he would be better tomorrow, and not 
whine about [place].  Patient settled in for the night after eating dinner and taking his 
medications and slept through the night.



Patient awoke shortly after shift change.  Patient has been anxious for most of the day 
because he called his Conservator, Amy, and she evidently informed him that he may go to 
[facility].  Patient does not want to go there and reports bad experiences there. 
Patient spent time pacing the hallways and talking to different staff of his dilemma of 
going to [place].  Patient complains of left ankle and hip pain and was provided with 
routine Motrin, as well as prn Tylenol.  Patient states that he is taking too much Depakote, 
and it needs to be reduced, but reinforced that he has been calm and cooperative with it.  
Patient lying down in bed at this time.



Plan:

Per Dr. Davalos, pt. continues to require a safe and supportive environment. Patient is 
conserved and is awaiting placement.

## 2022-10-16 VITALS — SYSTOLIC BLOOD PRESSURE: 141 MMHG | DIASTOLIC BLOOD PRESSURE: 87 MMHG

## 2022-10-16 VITALS — SYSTOLIC BLOOD PRESSURE: 100 MMHG | DIASTOLIC BLOOD PRESSURE: 68 MMHG

## 2022-10-16 RX ADMIN — IBUPROFEN SCH MG: 400 TABLET, FILM COATED ORAL at 07:47

## 2022-10-16 RX ADMIN — DIVALPROEX SODIUM SCH MG: 250 TABLET, DELAYED RELEASE ORAL at 20:45

## 2022-10-16 RX ADMIN — DIVALPROEX SODIUM SCH MG: 250 TABLET, DELAYED RELEASE ORAL at 07:47

## 2022-10-16 RX ADMIN — NICOTINE SCH PATCH: 21 PATCH, EXTENDED RELEASE TRANSDERMAL at 07:46

## 2022-10-16 RX ADMIN — BENZTROPINE MESYLATE SCH MG: 1 TABLET ORAL at 20:45

## 2022-10-16 RX ADMIN — IBUPROFEN SCH MG: 400 TABLET, FILM COATED ORAL at 12:08

## 2022-10-16 RX ADMIN — IBUPROFEN SCH MG: 400 TABLET, FILM COATED ORAL at 17:12

## 2022-10-16 RX ADMIN — DOCUSATE SODIUM SCH MG: 100 CAPSULE, LIQUID FILLED ORAL at 07:47

## 2022-10-16 RX ADMIN — BENZTROPINE MESYLATE SCH MG: 1 TABLET ORAL at 07:47

## 2022-10-16 NOTE — NUR
Nursing Progress Note



Problem: 

Per 5150 pt. is here for SA by OD on street drugs after command hallucinations telling him 
to do so. Pt. states that he took "Fentanyl, black china, meth, 8 ball" in suicide attempt. 
Pt. states he feels hopeless. Pt. is also delusional, stating, "they won't give me my 
billion dollars". Pt. states, "I tried killing myself because Im a free renetta. They dont 
love me but I love them. Yes I do."



Interventions: 



Maintained a safe and supportive environment, ensured contract for safety, provided clear 
and simple instructions, attempted to orient to reality, monitored behavior and provided 
intervention as needed, provided active listening and positive encouragement, encouraged 
independent performance of ADLs, and maintained Q 15min safety checks. 



Response:  Patient woke up early this morning. He was in a good mood, and remained so until 
mid-afternoon. He starts perseverating on why hes still here and gets angry. Patient sees 
other people discharging, and wonders, Why are they getting out before me? He also c/o leg 
pain from too much walking. Instead of less walking, he wants more pain medicine. When 
pointed out, he just gets more angry. 



Patient is resting quietly in bed at the start of the shift.  Cooperative with medications 
and assessment. Perseverates on his discharge plan.  Patient complains of BLE pain related 
to pacing the halls.  Continues on routine Motrin.  PRN Tylenol is helpful for breakthrough 
pain relief.  Patient paces the unit wearing headphones.  Denies any mental health symptoms 
at this time.  Does not appear to be responding to internal stimuli.



Plan:

Per Dr. Davalos, pt. continues to require a safe and supportive environment. Patient is 
conserved and is awaiting placement.

## 2022-10-16 NOTE — NUR
Nursing Progress Note



Problem: 

Per 5150 pt. is here for SA by OD on street drugs after command hallucinations telling him 
to do so. Pt. states that he took "Fentanyl, black china, meth, 8 ball" in suicide attempt. 
Pt. states he feels hopeless. Pt. is also delusional, stating, "they won't give me my 
billion dollars". Pt. states, "I tried killing myself because Im a free renetta. They dont 
love me but I love them. Yes I do."



Interventions: 



Maintained a safe and supportive environment, ensured contract for safety, provided clear 
and simple instructions, attempted to orient to reality, monitored behavior and provided 
intervention as needed, provided active listening and positive encouragement, encouraged 
independent performance of ADLs, and maintained Q 15min safety checks. 



Response:

Patient is awake in his room at the start of the shift.  Cooperative with assessment.  
Complains of anxiety stating, Im really anxious.  I feel like Im going to have a panic 
attack or something.  PRN Klonopin is given and patient takes a nap through the evening.  
Takes medication then goes to sleep.



Plan:

Per Dr. Davalos, pt. continues to require a safe and supportive environment. Patient is 
conserved and is awaiting placement.

## 2022-10-17 VITALS — SYSTOLIC BLOOD PRESSURE: 101 MMHG | DIASTOLIC BLOOD PRESSURE: 57 MMHG

## 2022-10-17 VITALS — DIASTOLIC BLOOD PRESSURE: 62 MMHG | SYSTOLIC BLOOD PRESSURE: 137 MMHG

## 2022-10-17 VITALS — SYSTOLIC BLOOD PRESSURE: 138 MMHG | DIASTOLIC BLOOD PRESSURE: 76 MMHG

## 2022-10-17 RX ADMIN — NICOTINE POLACRILEX PRN MG: 2 LOZENGE ORAL at 10:35

## 2022-10-17 RX ADMIN — DOCUSATE SODIUM SCH MG: 100 CAPSULE, LIQUID FILLED ORAL at 07:23

## 2022-10-17 RX ADMIN — IBUPROFEN SCH MG: 400 TABLET, FILM COATED ORAL at 17:46

## 2022-10-17 RX ADMIN — DIVALPROEX SODIUM SCH MG: 250 TABLET, DELAYED RELEASE ORAL at 20:37

## 2022-10-17 RX ADMIN — NICOTINE SCH PATCH: 21 PATCH, EXTENDED RELEASE TRANSDERMAL at 07:20

## 2022-10-17 RX ADMIN — BENZTROPINE MESYLATE SCH MG: 1 TABLET ORAL at 20:37

## 2022-10-17 RX ADMIN — IBUPROFEN SCH MG: 400 TABLET, FILM COATED ORAL at 13:27

## 2022-10-17 RX ADMIN — DIVALPROEX SODIUM SCH MG: 250 TABLET, DELAYED RELEASE ORAL at 07:20

## 2022-10-17 RX ADMIN — BENZTROPINE MESYLATE SCH MG: 1 TABLET ORAL at 07:19

## 2022-10-17 RX ADMIN — IBUPROFEN SCH MG: 400 TABLET, FILM COATED ORAL at 08:03

## 2022-10-17 NOTE — NUR
PRESSURE ULCER EDUCATION:



DEFINITION:  A pressure ulcer is an area of skin that breaks down when you stay in one 
position too long. The constant pressure against the skin reduces the blood flow to that 
area and the affected tissue dies.



CAUSES:

"Being bedridden or in a wheelchair

"Fragile skin

"Having a chronic condition, such as diabetes or vascular disease

"Inability to move certain parts of your body without assistance

"Older age

"Incontinence of urine or stool



SYMPTOMS:

"A reddened area that DOES NOT turn white when pressed on - this can be the beginning of a 
pressure ulcer

"A blister, deep sore or a crater - these can be advanced pressure ulcers



FIRST AID:

"Relieve the pressure on this area

"Keep the area clean and dry

"Call your primary doctor if you see any of the above symptoms

"DO NOT massage the area

"DO NOT use a donut shaped or ring shaped pillow- these actually interfere with the blood 
flow and cause complications



PREVENTION:

"Check for pressure ulcers everyday

"Change position at least every two hours to relieve pressure

"Use items that help relieve pressure- pillows, sheepskin, foam padding, and powders.

"Keep skin clean and dry

"Eat healthy well balanced meals

"Exercise daily



IF YOU SEE ANY OF THESE SYMPTOMS WHILE IN THE HOSPITAL - TELL YOUR NURSE IMMEDIATELY.



IF YOU SEE ANY OF THESE SYMPTOMS WHILE AT HOME OR HAVE ANY QUESTIONS OR CONCERNS  ABOUT 
PRESSURE ULCERS - CALL YOUR PRIMARY DOCTOR IMMEDIATELY.


-------------------------------------------------------------------------------

Addendum: 10/17/22 at 1542 by Sapna Armstrong RN

-------------------------------------------------------------------------------

Amended: Links added.

## 2022-10-17 NOTE — NUR
Nursing Progress Note



Problem: 

Per 5150 pt. is here for SA by OD on street drugs after command hallucinations telling him 
to do so. Pt. states that he took "Fentanyl, black china, meth, 8 ball" in suicide attempt. 
Pt. states he feels hopeless. Pt. is also delusional, stating, "they won't give me my 
billion dollars". Pt. states, "I tried killing myself because Im a free renetta. They dont 
love me but I love them. Yes I do."



Interventions: 



Maintained a safe and supportive environment, ensured contract for safety, provided clear 
and simple instructions, attempted to orient to reality, monitored behavior and provided 
intervention as needed, provided active listening and positive encouragement, encouraged 
independent performance of ADLs, and maintained Q 15min safety checks. 



Response: Patient is pleasant and cooperative with care; compliant with medication. He 
reported that he threw away his Nicotine patch. He denies SI, HI, A/VH this shift; no 
apparent delusions expressed. Patient appeared happy; social with peers and singing along to 
the music in the headset. He participated in HS snack prior to bed; observed sleeping and 
does not appear to be having difficulty. 



Plan:

Per Dr. Davalos, pt. continues to require a safe and supportive environment. Patient is 
conserved and is awaiting placement.

## 2022-10-17 NOTE — NUR
NURSING PROGRESS NOTE:



Problem:  Per 5150 pt. is here for SA by OD on street drugs after command hallucinations 
telling him to do so. Pt. states that he took "Fentanyl, black china, meth, 8 ball" in 
suicide attempt. Pt. states he feels hopeless. Pt. is also delusional, stating, "they won't 
give me my billion dollars". Pt. states, "I tried killing myself because Im a free renetta. 
They dont love me but I love them. Yes I do."



Interventions:  Maintained a safe and supportive environment, provided clear and simple 
instructions, attempted to orient to reality, monitored behavior and provided intervention 
as needed, provided active listening and positive encouragement, encouraged independent 
performance of ADLs, and maintained Q 15min safety checks. 



Response:  Received patient awake pacing the singh at shift change. Pt asked writer when can 
I get my medication?  Pt presented slightly anxious, but was cooperative and compliant. Pt 
paced the singh throughout the morning. Pt continues to fixate on weight gain. Pt was 
overheard saying I need to lose weight. Pt declined his morning snack. Pt is pretty 
consistent with pacing the singh. Pt also c/o bilateral leg and feet pain. Wound consult was 
placed as bilateral plantar side of foot is dry, cracked and one slightly open area. Pts 
pain is intermittently being controlled with PRN Tylenol, Tramadol and scheduled Motrin. 
Writer suggested to pt sit for a while and elevate feet pt states I cant sit, it drives me 
crazy, besides moving around helps me with my anxiety. Pt came to staff and verbalized he 
was feeling anxious. Not at anyone, its hard to explain. Pt was offered his PRN 
Klonopin. Pt became tearful and said I dont like being bipolar. After taking his 
medication pt said Im not going to quit, God doesnt like quitters. 



Writer found pt in another tearful episode, upon further questioning pt began talking about 
his bipolar was inherited. Pt became emotional with tears and said That means my son is 
going to be bipolar and it is all my fault. Pt had a negative outlook on his mental health. 
Writer sat with patient and let him talk. Writer also reinforced that his bipolar wasnt bad 
and how well he was doing on his medication. Writer compared bipolar to diabetes if you 
dont take your meds for either you get sick. Pt also talked about not wanting to be the 
death of his mother. Pt said I take responsibility for my actions. Pt continues to be 
tangential at times, but his insight is improving. Pt is learning to calm himself and 
recognize triggers and verbalize them.  Pt napped in the afternoon. Wound care assessed and 
placed orders. 





Plan: Patients continues to be labile, however moods are improving with medication 
compliance. Pt continues to require a safe and supportive environment. Pts conservatorship 
is still going forward.

## 2022-10-18 VITALS — SYSTOLIC BLOOD PRESSURE: 147 MMHG | DIASTOLIC BLOOD PRESSURE: 73 MMHG

## 2022-10-18 VITALS — SYSTOLIC BLOOD PRESSURE: 113 MMHG | DIASTOLIC BLOOD PRESSURE: 76 MMHG

## 2022-10-18 RX ADMIN — DIVALPROEX SODIUM SCH MG: 250 TABLET, DELAYED RELEASE ORAL at 20:50

## 2022-10-18 RX ADMIN — NICOTINE SCH PATCH: 21 PATCH, EXTENDED RELEASE TRANSDERMAL at 07:48

## 2022-10-18 RX ADMIN — IBUPROFEN SCH MG: 400 TABLET, FILM COATED ORAL at 17:46

## 2022-10-18 RX ADMIN — DIVALPROEX SODIUM SCH MG: 250 TABLET, DELAYED RELEASE ORAL at 07:48

## 2022-10-18 RX ADMIN — IBUPROFEN SCH MG: 400 TABLET, FILM COATED ORAL at 07:48

## 2022-10-18 RX ADMIN — DOCUSATE SODIUM SCH MG: 100 CAPSULE, LIQUID FILLED ORAL at 07:53

## 2022-10-18 RX ADMIN — IBUPROFEN SCH MG: 400 TABLET, FILM COATED ORAL at 13:11

## 2022-10-18 RX ADMIN — BENZTROPINE MESYLATE SCH MG: 1 TABLET ORAL at 20:49

## 2022-10-18 RX ADMIN — BENZTROPINE MESYLATE SCH MG: 1 TABLET ORAL at 07:48

## 2022-10-18 NOTE — NUR
Nursing Progress Note



Problem: 

Per 5150 pt. is here for SA by OD on street drugs after command hallucinations telling him 
to do so. Pt. states that he took "Fentanyl, black china, meth, 8 ball" in suicide attempt. 
Pt. states he feels hopeless. Pt. is also delusional, stating, "they won't give me my 
billion dollars". Pt. states, "I tried killing myself because Im a free renetta. They dont 
love me but I love them. Yes I do."



Interventions: 



Maintained a safe and supportive environment, ensured contract for safety, provided clear 
and simple instructions, attempted to orient to reality, monitored behavior and provided 
intervention as needed, provided active listening and positive encouragement, encouraged 
independent performance of ADLs, and maintained Q 15min safety checks. 



Response: Patient is pleasant and cooperative with care; compliant with medication. PRN 
Clonazepam provided per patient request for increased anxiety. He reported he removed his 
Nicotine patch. Patient denies SI, HI, A/VH; no apparent delusions expressed this shift. He 
was briefly social and pacing the unit but later was isolative; did not participate in HS 
snack. Observed sleeping and does not appear to be having difficulty. 



Plan:

Per Dr. Davalos, pt. continues to require a safe and supportive environment. Patient is 
conserved and is awaiting placement.

## 2022-10-18 NOTE — NUR
CONSERVATORSHIP PROCESS, CONTACT UPDATE

11/3/22 back from vacation, [facility] Public Guardian Linda Lord. [place] Public 
Guardian, Nafisa Trammell can be reached at [phone].

## 2022-10-18 NOTE — NUR
NURSING PROGRESS NOTE:



Problem:  Per 5150 pt. is here for SA by OD on street drugs after command hallucinations 
telling him to do so. Pt. states that he took "Fentanyl, black china, meth, 8 ball" in 
suicide attempt. Pt. states he feels hopeless. Pt. is also delusional, stating, "they won't 
give me my billion dollars". Pt. states, "I tried killing myself because Im a free renetta. 
They dont love me but I love them. Yes I do."



Interventions:  Maintained a safe and supportive environment, provided foot care per wound 
care orders, provided clear and simple instructions, attempted to orient to reality, 
monitored behavior and provided intervention as needed, provided active listening and 
positive encouragement, encouraged independent performance of ADLs, and maintained Q 15min 
safety checks. 



Response:  Received patient sleeping at shift change. Pt slept 8.25 hours per sleep 
assessment. Pt woke prior to breakfast and presented calm. Pt didnt ask for medication, but 
was compliant when administered. Pt c/o bilateral foot pain. Pt was encouraged to utilize 
the recumbent bike instead of pacing all day. During one on one assessment pt handed writer 
his petition for temporary conservatorship papers I was served this yesterday. Pt stated 
I will go where they send me. Pt later had an outburst where he threw his water pitcher on 
the ground and hit the wall. Pt was mad about being conserved I am tired of being lied 
too! My mom needs to get me out of this! Writer listedned to patient, validated his 
feelings of being mad, but also told him he cant be tossing and hitting things. Pt was 
given a towel and he cleaned up his room without issue. Pt didnt require PRN at this time. 
Pt later came to writer and apologized pt stated that was immature of me to do that. 
Feelings were validated and writer reinforced pt processing his angry before acting out. 
Pts insight continues to improve. Pt napped a few hours during the afternoon. No more 
outbursts noted. Foot care was provided per wound care orders. No delusional statement made.



PRN Klonopin was utilized at pt's request with effect. 





Plan: Patients continues to be labile, however moods are improving with medication 
compliance. Pt continues to require a safe and supportive environment. Pts conservatorship 
is still going forward.


-------------------------------------------------------------------------------

Addendum: 10/18/22 at 1748 by Amy Baker RN

-------------------------------------------------------------------------------

PT REMOVED NICOTINE PATCH. DISPOSED OF IN PROPER RECEPTACLE

## 2022-10-19 VITALS — SYSTOLIC BLOOD PRESSURE: 138 MMHG | DIASTOLIC BLOOD PRESSURE: 85 MMHG

## 2022-10-19 VITALS — DIASTOLIC BLOOD PRESSURE: 62 MMHG | SYSTOLIC BLOOD PRESSURE: 110 MMHG

## 2022-10-19 VITALS — SYSTOLIC BLOOD PRESSURE: 110 MMHG | DIASTOLIC BLOOD PRESSURE: 62 MMHG

## 2022-10-19 VITALS — DIASTOLIC BLOOD PRESSURE: 75 MMHG | SYSTOLIC BLOOD PRESSURE: 120 MMHG

## 2022-10-19 RX ADMIN — NICOTINE POLACRILEX PRN MG: 2 LOZENGE ORAL at 11:53

## 2022-10-19 RX ADMIN — DOCUSATE SODIUM SCH MG: 100 CAPSULE, LIQUID FILLED ORAL at 07:33

## 2022-10-19 RX ADMIN — NICOTINE POLACRILEX PRN MG: 2 LOZENGE ORAL at 18:55

## 2022-10-19 RX ADMIN — IBUPROFEN SCH MG: 400 TABLET, FILM COATED ORAL at 07:33

## 2022-10-19 RX ADMIN — DIVALPROEX SODIUM SCH MG: 250 TABLET, DELAYED RELEASE ORAL at 07:33

## 2022-10-19 RX ADMIN — IBUPROFEN SCH MG: 400 TABLET, FILM COATED ORAL at 17:20

## 2022-10-19 RX ADMIN — BENZTROPINE MESYLATE SCH MG: 1 TABLET ORAL at 07:33

## 2022-10-19 RX ADMIN — NICOTINE SCH PATCH: 21 PATCH, EXTENDED RELEASE TRANSDERMAL at 07:39

## 2022-10-19 RX ADMIN — DIVALPROEX SODIUM SCH MG: 250 TABLET, DELAYED RELEASE ORAL at 20:38

## 2022-10-19 RX ADMIN — IBUPROFEN SCH MG: 400 TABLET, FILM COATED ORAL at 12:02

## 2022-10-19 RX ADMIN — BENZTROPINE MESYLATE SCH MG: 1 TABLET ORAL at 20:40

## 2022-10-19 NOTE — NUR
Nursing Progress Note



Problem: 

Per 5150 pt. is here for SA by OD on street drugs after command hallucinations telling him 
to do so. Pt. states that he took "Fentanyl, black china, meth, 8 ball" in suicide attempt. 
Pt. states he feels hopeless. Pt. is also delusional, stating, "they won't give me my 
billion dollars". Pt. states, "I tried killing myself because Im a free renetta. They dont 
love me but I love them. Yes I do."



Interventions: 



Maintained a safe and supportive environment, ensured contract for safety, provided clear 
and simple instructions, attempted to orient to reality, monitored behavior and provided 
intervention as needed, provided active listening and positive encouragement, encouraged 
independent performance of ADLs, and maintained Q 15min safety checks. 



Response: Patient is pleasant and cooperative with care; compliant with medication. Claimed 
to have removed Nicotine patch prior to med pass. He denies SI, HI, A/VH; no apparent 
delusions expressed. He appears fatigued and guarded this shift. Patient remained in bed 
throughout this shift; observed sleeping and does not appear to be having difficulty. 



Plan:

Per Dr. Davalos, pt. continues to require a safe and supportive environment. Patient is 
conserved and is awaiting placement.

## 2022-10-19 NOTE — NUR
NURSING PROGRESS NOTE:



Problem:  Per 5150 pt. is here for SA by OD on street drugs after command hallucinations 
telling him to do so. Pt. states that he took "Fentanyl, black china, meth, 8 ball" in 
suicide attempt. Pt. states he feels hopeless. Pt. is also delusional, stating, "they won't 
give me my billion dollars". Pt. states, "I tried killing myself because Im a free renetta. 
They dont love me but I love them. Yes I do."



Interventions:  Maintained a safe and supportive environment, provided foot care per wound 
care orders, provided clear and simple instructions, attempted to orient to reality, 
monitored behavior and provided intervention as needed, provided active listening and 
positive encouragement, encouraged independent performance of ADLs, and maintained Q 15min 
safety checks. 



Response:  Received patient sleeping at shift change. Pt slept 8.0 hours per sleep 
assessment. Pt was more visible on unit today, pacing as is his routine. Pt had no outbursts 
today and didnt utilize any PRNs. Pt has his moments of tangential conversation r/t his 
conservatorship and not wanting to be here It has been constructive to validate his 
feelings, but continue to set boundaries for his behavior. Pt is difficult to understand 
sometimes as his speech is muffled. Pt denies SI/HI. Pt reports AH are lessoned, but were 
evil.  Foot care was provided per wound care orders. 





Plan: Patients continues to be labile, however moods are improving with medication 
compliance. Pt continues to require a safe and supportive environment. Pts conservatorship 
is still going forward.

## 2022-10-19 NOTE — NUR
Reassessment: Pt continues on Vegetarian diet while receiving double protein TID, eating 
mostly 100% of meals meeting est needs at this time. [facility] 10/17 receiving routine and PRN 
bowel care No change to recommendations at this time, will continue to monitor.

Recommendations:

1) Continue Vegetarian diet per pt request

2) Double protein TID

3) Bowel care PRN

4) Weekly scaled weights

-------------------------------------------------------------------------------

Addendum: 10/19/22 at 0736 by Loc Villalba RD

-------------------------------------------------------------------------------

Amended: Links added.

## 2022-10-20 VITALS — DIASTOLIC BLOOD PRESSURE: 67 MMHG | SYSTOLIC BLOOD PRESSURE: 91 MMHG

## 2022-10-20 VITALS — SYSTOLIC BLOOD PRESSURE: 113 MMHG | DIASTOLIC BLOOD PRESSURE: 65 MMHG

## 2022-10-20 RX ADMIN — IBUPROFEN SCH MG: 400 TABLET, FILM COATED ORAL at 11:23

## 2022-10-20 RX ADMIN — BENZTROPINE MESYLATE SCH MG: 1 TABLET ORAL at 07:40

## 2022-10-20 RX ADMIN — IBUPROFEN SCH MG: 400 TABLET, FILM COATED ORAL at 16:31

## 2022-10-20 RX ADMIN — IBUPROFEN SCH MG: 400 TABLET, FILM COATED ORAL at 07:41

## 2022-10-20 RX ADMIN — DOCUSATE SODIUM SCH MG: 100 CAPSULE, LIQUID FILLED ORAL at 07:41

## 2022-10-20 RX ADMIN — DIVALPROEX SODIUM SCH MG: 250 TABLET, DELAYED RELEASE ORAL at 07:41

## 2022-10-20 RX ADMIN — NICOTINE SCH PATCH: 21 PATCH, EXTENDED RELEASE TRANSDERMAL at 07:42

## 2022-10-20 RX ADMIN — DIVALPROEX SODIUM SCH MG: 250 TABLET, DELAYED RELEASE ORAL at 20:24

## 2022-10-20 RX ADMIN — NICOTINE POLACRILEX PRN MG: 2 LOZENGE ORAL at 11:23

## 2022-10-20 RX ADMIN — BENZTROPINE MESYLATE SCH MG: 1 TABLET ORAL at 20:24

## 2022-10-20 NOTE — NUR
Nursing Progress Note



Problem: 

Per 5150 pt. is here for SA by OD on street drugs after command hallucinations telling him 
to do so. Pt. states that he took "Fentanyl, black china, meth, 8 ball" in suicide attempt. 
Pt. states he feels hopeless. Pt. is also delusional, stating, "they won't give me my 
billion dollars". Pt. states, "I tried killing myself because Im a free renetta. They dont 
love me but I love them. Yes I do."



Interventions: 



Maintained a safe and supportive environment, ensured contract for safety, provided clear 
and simple instructions, attempted to orient to reality, monitored behavior and provided 
intervention as needed, provided active listening and positive encouragement, encouraged 
independent performance of ADLs, and maintained Q 15min safety checks. 



Response: Patient is pleasant and cooperative with care; compliant with medication. PRN 
Tylenol provided for knee pain. Nicotine patch removed. He denies SI, HI, A/VH; no apparent 
delusions expressed this shift. Patient mostly self isolated; participated in HS snack and 
retired to bed. Observed sleeping and does not appear to be having difficulty. 



Plan:

Per Dr. Davalos, pt. continues to require a safe and supportive environment. Patient is 
conserved and is awaiting placement.

## 2022-10-20 NOTE — NUR
Nursing Progress Note: [place]



Problem : 

Per 5150 pt. is here for SA by OD on street drugs after command hallucinations telling him 
to do so. Pt. states that he took "Fentanyl, black china, meth, 8 ball" in suicide attempt.

Interventions : 

Maintained a safe and supportive environment, ensured contract for safety, provided clear 
and simple instructions, attempted to orient to reality, monitored behavior and provided 
intervention as needed, provided active listening and positive encouragement, encouraged 
independent performance of ADLs, and maintained Q 15min safety checks. 



Response : 

Received pt. asleep, he woke to attend breakfast. He denies SI, HI, A/VH and reports hes 
ready for placement. Pt. took his medication without hesitancy, and spent most of the shift 
OOB and walking around the unit or talking on the phone. He has good hygiene and is wearing 
street clothes. He ate all meals in the dining room and often socializes with cohorts. No 
c/o anxiety, but pt. presented with increased emotions and was tearful stating I let my Mom 
and my son down because I did meth, Im never gonna get out of here Writer encouraged pt. to 
look forward and focus on his sobriety and discussed upcoming steps. PRN Klonopin requested 
and was given. As the day went on pt. was on edge with increased signs of agitation, and 
making several calls to his family and verbalized fighting this case Pt. was able to be re 
directed and later returned apologetic stating Im sorry about all that He was encouraged 
to participate in group. 

Pt. weekly weight was 117.9kg



Plan :

 Per Dr. Davalos, pt. continues to require a safe and supportive environment. 
Conservatorship is recommended.

## 2022-10-21 VITALS — DIASTOLIC BLOOD PRESSURE: 72 MMHG | SYSTOLIC BLOOD PRESSURE: 123 MMHG

## 2022-10-21 VITALS — SYSTOLIC BLOOD PRESSURE: 121 MMHG | DIASTOLIC BLOOD PRESSURE: 82 MMHG

## 2022-10-21 LAB
ALBUMIN SERPL BCP-MCNC: 3.1 G/DL (ref 3.4–5)
ALBUMIN/GLOB SERPL: 1 {RATIO} (ref 1.1–1.5)
ALP SERPL-CCNC: 71 IU/L (ref 46–116)
ALT SERPL W P-5'-P-CCNC: 24 U/L (ref 12–78)
ANION GAP SERPL CALCULATED.3IONS-SCNC: 8 MMOL/L (ref 8–16)
AST SERPL W P-5'-P-CCNC: 15 U/L (ref 10–37)
BASOPHILS # BLD AUTO: 0.1 X10'3 (ref 0–0.2)
BASOPHILS NFR BLD AUTO: 1.4 % (ref 0–1)
BILIRUB SERPL-MCNC: 0.3 MG/DL (ref 0.1–1)
BUN SERPL-MCNC: 14 MG/DL (ref 7–18)
BUN/CREAT SERPL: 17.7 (ref 5.4–32)
CALCIUM SERPL-MCNC: 8.1 MG/DL (ref 8.5–10.1)
CHLORIDE SERPL-SCNC: 103 MMOL/L (ref 99–107)
CO2 SERPL-SCNC: 29.4 MMOL/L (ref 24–32)
CREAT SERPL-MCNC: 0.79 MG/DL (ref 0.6–1.1)
EOSINOPHIL # BLD AUTO: 0.5 X10'3 (ref 0–0.9)
EOSINOPHIL NFR BLD AUTO: 10.7 % (ref 0–6)
ERYTHROCYTE [DISTWIDTH] IN BLOOD BY AUTOMATED COUNT: 13.5 % (ref 11.5–14.5)
GFR SERPL CREATININE-BSD FRML MDRD: > 90 ML/MIN
GLUCOSE SERPL-MCNC: 95 MG/DL (ref 70–104)
HCT VFR BLD AUTO: 41.4 % (ref 42–52)
HGB BLD-MCNC: 14 G/DL (ref 14–17.9)
LYMPHOCYTES # BLD AUTO: 1.2 X10'3 (ref 1.1–4.8)
LYMPHOCYTES NFR BLD AUTO: 26.5 % (ref 21–51)
MCH RBC QN AUTO: 30.6 PG (ref 27–31)
MCHC RBC AUTO-ENTMCNC: 33.7 G/DL (ref 33–36.5)
MCV RBC AUTO: 90.9 FL (ref 78–98)
MONOCYTES # BLD AUTO: 0.4 X10'3 (ref 0–0.9)
MONOCYTES NFR BLD AUTO: 9.2 % (ref 2–12)
NEUTROPHILS # BLD AUTO: 2.4 X10'3 (ref 1.8–7.7)
NEUTROPHILS NFR BLD AUTO: 52.2 % (ref 42–75)
PLATELET # BLD AUTO: 180 X10'3 (ref 140–440)
PMV BLD AUTO: 8.5 FL (ref 7.4–10.4)
POTASSIUM SERPL-SCNC: 4.2 MMOL/L (ref 3.5–5.1)
PROT SERPL-MCNC: 6.1 G/DL (ref 6.4–8.2)
RBC # BLD AUTO: 4.55 X10'6 (ref 4.7–6.1)
SODIUM SERPL-SCNC: 140 MMOL/L (ref 135–145)
VALPROATE SERPL-MCNC: < 3 UG/ML (ref 50–100)
WBC # BLD AUTO: 4.5 X10'3 (ref 4.5–11)

## 2022-10-21 RX ADMIN — DOCUSATE SODIUM SCH MG: 100 CAPSULE, LIQUID FILLED ORAL at 07:38

## 2022-10-21 RX ADMIN — DIVALPROEX SODIUM SCH MG: 250 TABLET, DELAYED RELEASE ORAL at 07:37

## 2022-10-21 RX ADMIN — IBUPROFEN SCH MG: 400 TABLET, FILM COATED ORAL at 07:38

## 2022-10-21 RX ADMIN — NICOTINE SCH PATCH: 21 PATCH, EXTENDED RELEASE TRANSDERMAL at 07:41

## 2022-10-21 RX ADMIN — IBUPROFEN SCH MG: 400 TABLET, FILM COATED ORAL at 11:44

## 2022-10-21 RX ADMIN — BENZTROPINE MESYLATE SCH MG: 1 TABLET ORAL at 20:17

## 2022-10-21 RX ADMIN — DIVALPROEX SODIUM SCH MG: 250 TABLET, DELAYED RELEASE ORAL at 20:15

## 2022-10-21 RX ADMIN — NICOTINE POLACRILEX PRN MG: 2 LOZENGE ORAL at 09:14

## 2022-10-21 RX ADMIN — BENZTROPINE MESYLATE SCH MG: 1 TABLET ORAL at 07:38

## 2022-10-21 RX ADMIN — IBUPROFEN SCH MG: 400 TABLET, FILM COATED ORAL at 18:20

## 2022-10-21 NOTE — NUR
Nursing Progress Note: Mike



Problem : 

Per 5150 pt. is here for SA by OD on street drugs after command hallucinations telling him 
to do so. Pt. states that he took "Fentanyl, black china, meth, 8 ball" in suicide attempt.

Interventions : 

Maintained a safe and supportive environment, ensured contract for safety, provided clear 
and simple instructions, attempted to orient to reality, monitored behavior and provided 
intervention as needed, provided active listening and positive encouragement, encouraged 
independent performance of ADLs, and maintained Q 15min safety checks. 



Response : 

Received pt. asleep, he awoke to receive medications and proceeded to breakfast. Pt. took 
his medications without hesitation and denies SI, HI, A/VH, and reports his plan is to go 
along with the court Pt. recounted yesterdays events and reports his plan to have a good 
day. Pt. has been out of his room ambulating in the singh, he reports Lt. foot pain and 
received PRN Tylenol, but refused needing the cream. Pt. ate his meals in the dining room 
and has been observed socializing at times. He is dressed in his clothes from yesterday, and 
hygiene is fair.  Pt. requested PRN Klonopin for anxiety med given with good results. Pt. 
is calm today and with no outburst.



Plan :

 Per Dr. Davalos, pt. continues to require a safe and supportive environment. 
Conservatorship is recommended.

## 2022-10-21 NOTE — NUR
Nursing Progress Note: [place]



Problem : 

Per 5150 pt. is here for SA by OD on street drugs after command hallucinations telling him 
to do so. Pt. states that he took "Fentanyl, black china, meth, 8 ball" in suicide attempt.



Interventions : 

Maintained a safe and supportive environment, ensured contract for safety, provided clear 
and simple instructions, attempted to orient to reality, monitored behavior and provided 
intervention as needed, provided active listening and positive encouragement, encouraged 
independent performance of ADLs, and maintained Q 15min safety checks. 



Response : Patient was found sleeping at change of shift. Patient continued to sleep until 
getting vitals. Patient began complaining of knee pain and was given prn Tylenol. Patient 
continued to socialize with others and pace hallway until snack time. Patient participated 
in snack and expressed his frustration of it taking so long for him to get conserved and to 
get out of here. Patient took all night medications without issue and returned to bed. 
Patient had no difficulty sleeping.



Plan :

 Per Dr. Davalos, pt. continues to require a safe and supportive environment. 
Conservatorship is recommended.

## 2022-10-21 NOTE — NUR
pt. refused cream

-------------------------------------------------------------------------------

Addendum: 10/21/22 at 0941 by Gisele Matt RN

-------------------------------------------------------------------------------

Amended: Links added.

## 2022-10-21 NOTE — NUR
pt. refused needing cream

-------------------------------------------------------------------------------

Addendum: 10/21/22 at 0941 by Gisele Matt RN

-------------------------------------------------------------------------------

Amended: Links added.

## 2022-10-22 VITALS — DIASTOLIC BLOOD PRESSURE: 72 MMHG | SYSTOLIC BLOOD PRESSURE: 122 MMHG

## 2022-10-22 VITALS — SYSTOLIC BLOOD PRESSURE: 114 MMHG | DIASTOLIC BLOOD PRESSURE: 66 MMHG

## 2022-10-22 RX ADMIN — IBUPROFEN SCH MG: 400 TABLET, FILM COATED ORAL at 07:25

## 2022-10-22 RX ADMIN — DOCUSATE SODIUM SCH MG: 100 CAPSULE, LIQUID FILLED ORAL at 07:25

## 2022-10-22 RX ADMIN — NICOTINE SCH PATCH: 21 PATCH, EXTENDED RELEASE TRANSDERMAL at 07:28

## 2022-10-22 RX ADMIN — IBUPROFEN SCH MG: 400 TABLET, FILM COATED ORAL at 16:55

## 2022-10-22 RX ADMIN — BENZTROPINE MESYLATE SCH MG: 1 TABLET ORAL at 20:35

## 2022-10-22 RX ADMIN — NICOTINE POLACRILEX PRN MG: 2 LOZENGE ORAL at 12:06

## 2022-10-22 RX ADMIN — DIVALPROEX SODIUM SCH MG: 250 TABLET, DELAYED RELEASE ORAL at 07:25

## 2022-10-22 RX ADMIN — BENZTROPINE MESYLATE SCH MG: 1 TABLET ORAL at 07:24

## 2022-10-22 RX ADMIN — DIVALPROEX SODIUM SCH MG: 250 TABLET, DELAYED RELEASE ORAL at 20:35

## 2022-10-22 RX ADMIN — IBUPROFEN SCH MG: 400 TABLET, FILM COATED ORAL at 12:05

## 2022-10-22 NOTE — NUR
Nursing Progress Note: [place]



Problem : 

Per 5150 pt. is here for SA by OD on street drugs after command hallucinations telling him 
to do so. Pt. states that he took "Fentanyl, black china, meth, 8 ball" in suicide attempt.





Interventions : 

Maintained a safe and supportive environment, ensured contract for safety, provided clear 
and simple instructions, attempted to orient to reality, monitored behavior and provided 
intervention as needed, provided active listening and positive encouragement, encouraged 
independent performance of ADLs, and maintained Q 15min safety checks. 



Response : Patient was received sleeping in bed at change of shift. Patient continued to 
sleep and self isolate in room until snack time. Patient participated in snack and had taken 
all night medications without difficulty before returning back to bed. Patient requested no 
prns this shift.





Plan :

 Per Dr. Davalos, pt. continues to require a safe and supportive environment. 
Conservatorship is recommended.

## 2022-10-22 NOTE — NUR
Nursing Progress Note:    



Problem : 

Per 5150 pt. is here for SA by OD on street drugs after command hallucinations telling him 
to do so. Pt. states that he took "Fentanyl, black china, meth, 8 ball" in suicide attempt.



Interventions : 

Maintained a safe and supportive environment, ensured contract for safety, provided clear 
and simple instructions, attempted to orient to reality, monitored behavior and provided 
intervention as needed, provided active listening and positive encouragement, encouraged 
independent performance of ADLs, and maintained Q 15min safety checks. 



Response : 

Received Pt in bed sleeping w/o distress. Pt woke and was cooperative with vitals and 
returned to sleeping. Pt woke again for breakfast and ate well, and took AM meds w/o issue, 
requesting them early. Pt lethargic and returned to room after pacing halls for a bit. Pt 
took shower and Nicotine patch fell off, so Pt gave it to this RN and used a nicotine 
lozenge. Pt became vocal about his frustrations r/t wanting to leave but did not become 
agitated and was given PRN Klonopin in afternoon. Pt napped in afternoon and woke for dinner 
and ate well with others. Pt did not have any loud or aggressive outbursts today.



Plan :

 Per Dr. Davalos, pt. continues to require a safe and supportive environment. 
Conservatorship is recommended.

## 2022-10-23 VITALS — SYSTOLIC BLOOD PRESSURE: 128 MMHG | DIASTOLIC BLOOD PRESSURE: 63 MMHG

## 2022-10-23 VITALS — DIASTOLIC BLOOD PRESSURE: 73 MMHG | SYSTOLIC BLOOD PRESSURE: 107 MMHG

## 2022-10-23 RX ADMIN — IBUPROFEN SCH MG: 400 TABLET, FILM COATED ORAL at 08:26

## 2022-10-23 RX ADMIN — IBUPROFEN SCH MG: 400 TABLET, FILM COATED ORAL at 17:12

## 2022-10-23 RX ADMIN — DOCUSATE SODIUM SCH MG: 100 CAPSULE, LIQUID FILLED ORAL at 08:26

## 2022-10-23 RX ADMIN — NICOTINE POLACRILEX PRN MG: 2 LOZENGE ORAL at 08:36

## 2022-10-23 RX ADMIN — BENZTROPINE MESYLATE SCH MG: 1 TABLET ORAL at 19:49

## 2022-10-23 RX ADMIN — IBUPROFEN SCH MG: 400 TABLET, FILM COATED ORAL at 12:33

## 2022-10-23 RX ADMIN — DIVALPROEX SODIUM SCH MG: 250 TABLET, DELAYED RELEASE ORAL at 19:49

## 2022-10-23 RX ADMIN — NICOTINE SCH PATCH: 21 PATCH, EXTENDED RELEASE TRANSDERMAL at 08:27

## 2022-10-23 RX ADMIN — BENZTROPINE MESYLATE SCH MG: 1 TABLET ORAL at 08:26

## 2022-10-23 RX ADMIN — DIVALPROEX SODIUM SCH MG: 250 TABLET, DELAYED RELEASE ORAL at 08:26

## 2022-10-23 NOTE — NUR
Nursing Progress Note:    [place]



Problem : 

Per 5150 pt. is here for SA by OD on street drugs after command hallucinations telling him 
to do so. Pt. states that he took "Fentanyl, black china, meth, 8 ball" in suicide attempt.



Interventions : 

Maintained a safe and supportive environment, ensured contract for safety, provided clear 
and simple instructions, attempted to orient to reality, monitored behavior and provided 
intervention as needed, provided active listening and positive encouragement, encouraged 
independent performance of ADLs, and maintained Q 15min safety checks. 



Response : 

Received pt in bed sleeping.  Woke pt up for HS medications and he states he is doing well, 
no complaints.  Observed pt swallow all HS medications.  Pt went back to sleep, no 
complaints or needs at this time, will continue to monitor.  Pt did not have any loud or 
aggressive outbursts this shift. 



Plan :

 Per Dr. Davalos, pt. continues to require a safe and supportive environment. 
Conservatorship is recommended.

## 2022-10-24 VITALS — DIASTOLIC BLOOD PRESSURE: 82 MMHG | SYSTOLIC BLOOD PRESSURE: 120 MMHG

## 2022-10-24 VITALS — SYSTOLIC BLOOD PRESSURE: 121 MMHG | DIASTOLIC BLOOD PRESSURE: 69 MMHG

## 2022-10-24 RX ADMIN — BENZTROPINE MESYLATE SCH MG: 1 TABLET ORAL at 07:22

## 2022-10-24 RX ADMIN — IBUPROFEN SCH MG: 400 TABLET, FILM COATED ORAL at 11:51

## 2022-10-24 RX ADMIN — DOCUSATE SODIUM SCH MG: 100 CAPSULE, LIQUID FILLED ORAL at 07:22

## 2022-10-24 RX ADMIN — IBUPROFEN SCH MG: 400 TABLET, FILM COATED ORAL at 16:47

## 2022-10-24 RX ADMIN — DIVALPROEX SODIUM SCH MG: 250 TABLET, DELAYED RELEASE ORAL at 07:22

## 2022-10-24 RX ADMIN — BENZTROPINE MESYLATE SCH MG: 1 TABLET ORAL at 19:57

## 2022-10-24 RX ADMIN — DIVALPROEX SODIUM SCH MG: 250 TABLET, DELAYED RELEASE ORAL at 19:57

## 2022-10-24 RX ADMIN — NICOTINE SCH PATCH: 21 PATCH, EXTENDED RELEASE TRANSDERMAL at 07:21

## 2022-10-24 RX ADMIN — IBUPROFEN SCH MG: 400 TABLET, FILM COATED ORAL at 07:23

## 2022-10-24 NOTE — NUR
NURSING PROGRESS NOTE



Problem : Per 5150 pt. is here for SA by OD on street drugs after command hallucinations 
telling him to do so. Pt. states that he took "Fentanyl, black china, meth, 8 ball" in 
suicide attempt.



Interventions : Maintained a safe and supportive environment, provided clear and simple 
instructions, monitored behavior and provided intervention as needed, provided active 
listening and positive encouragement, encouraged independent performance of ADLs, and 
maintained Q 15min safety checks. 



Response :  Patient was pacing singh listening to headphones at shift change. Patient spoke 
with his mom and appeared to be less anxious after conversation. Pts mother is concerned 
that pts rights are going to be completely stripped away. She stated she read in the 
conservator papers about a death benefit and told him not to sign anything regarding housing 
(writer not sure what she meant, referred her to .) Pt wanted another copy of papers, one 
was printed and placed on shelf. Pt was compliant with medication and was observed taking 
meds without issue. Nicotine patch was removed. No outbursts or PRNs required. No 
delusional statements. 

Per PN ammonia level and Depakote level will be redrawn in a couple of days. 



Plan :   Patient continues to require a safe and supportive environment, mood continues to 
improve each day. Conservatorship continues to go forward.

## 2022-10-24 NOTE — NUR
NURSING PROGRESS NOTE



Problem : 

Per 5150 pt. is here for SA by OD on street drugs after command hallucinations telling him 
to do so. Pt. states that he took "Fentanyl, black china, meth, 8 ball" in suicide attempt.



Interventions : 

Maintained a safe and supportive environment, ensured contract for safety, provided clear 
and simple instructions, monitored behavior and provided intervention as needed, provided 
active listening and positive encouragement, encouraged independent performance of ADLs, and 
maintained Q 15min safety checks. 



Response :   



Patient was asleep at change of shift and up soon after.  Patient had been speaking to his 
mom all morning on the phone and she was making patient anxious because of her worry about 
his conservatorship.  Patient believing that his conservator was going to put life insurance 
on him and then collect the money. Patient kept amping and received Clonazepam 2 mg.  During 
morning break another patient criticized his music.  Patient got very angry and was yelling 
and storming up and down the singh.  He eventually made it to his room.  RN gave patient and 
addition 1 mg Ativan and 5 mg Haldol to the 1 mg Ativan and 5 mg Haldol that was already 
ordered for noon.  Patient took the medication and fell asleep in his bed.  An hour later he 
was up, calm and eating lunch.  Patient was polite and in a much better mood as of this 
writing. 



Plan :   Patient continues to require a safe and supportive environment, mood continues to 
improve each day. Conservatorship continues to go forward.

## 2022-10-24 NOTE — NUR
Therapeutic Group, Note

Client attended 2 p.m. therapeutic group. Group method:  group talk regarding healthy 
communication based on Jran method -- no criticism, contempt, defensiveness, 
stonewalling, but stressing need to keep self and others safe (for example, it's okay not to 
talk or engage with someone when it's not safe). Acknowledged the uniqueness of this 
environment, and processing at length may or may not be necessary (chose communication based 
on setting, culture, need, relationship, etc.) Client stayed for the duration of group time.

## 2022-10-24 NOTE — NUR
DISCHARGE PLANNING

[facility] granted a temporary conservatorship 10/20/22. Contact is Jose Juan Hawkins 
[phone]. Discussed with client. Client responds well to frequent reminders of the 
status of his process, his conservatorship being temporary, the same as his prior 
conservatorship (which ended in 2021), etc.

## 2022-10-25 VITALS — SYSTOLIC BLOOD PRESSURE: 134 MMHG | DIASTOLIC BLOOD PRESSURE: 69 MMHG

## 2022-10-25 VITALS — DIASTOLIC BLOOD PRESSURE: 70 MMHG | SYSTOLIC BLOOD PRESSURE: 120 MMHG

## 2022-10-25 RX ADMIN — BENZTROPINE MESYLATE SCH MG: 1 TABLET ORAL at 19:49

## 2022-10-25 RX ADMIN — DIVALPROEX SODIUM SCH MG: 250 TABLET, DELAYED RELEASE ORAL at 08:12

## 2022-10-25 RX ADMIN — NICOTINE POLACRILEX PRN MG: 2 LOZENGE ORAL at 10:46

## 2022-10-25 RX ADMIN — DOCUSATE SODIUM SCH MG: 100 CAPSULE, LIQUID FILLED ORAL at 08:14

## 2022-10-25 RX ADMIN — IBUPROFEN SCH MG: 400 TABLET, FILM COATED ORAL at 13:06

## 2022-10-25 RX ADMIN — NICOTINE POLACRILEX PRN MG: 2 LOZENGE ORAL at 08:32

## 2022-10-25 RX ADMIN — BENZTROPINE MESYLATE SCH MG: 1 TABLET ORAL at 08:14

## 2022-10-25 RX ADMIN — DIVALPROEX SODIUM SCH MG: 250 TABLET, DELAYED RELEASE ORAL at 19:48

## 2022-10-25 RX ADMIN — IBUPROFEN SCH MG: 400 TABLET, FILM COATED ORAL at 17:09

## 2022-10-25 RX ADMIN — NICOTINE SCH PATCH: 21 PATCH, EXTENDED RELEASE TRANSDERMAL at 08:15

## 2022-10-25 RX ADMIN — NICOTINE POLACRILEX PRN MG: 2 LOZENGE ORAL at 17:09

## 2022-10-25 RX ADMIN — IBUPROFEN SCH MG: 400 TABLET, FILM COATED ORAL at 08:13

## 2022-10-25 NOTE — NUR
PLACEMENT UPDATE



Sent [facility] packet [facility].



Phone# [phone]

Email: [email or web address]



SIDDHARTHA Salamanca

## 2022-10-25 NOTE — NUR
NURSING PROGRESS NOTE



Problem : Per 5150 pt. is here for SA by OD on street drugs after command hallucinations 
telling him to do so. Pt. states that he took "Fentanyl, black china, meth, 8 ball" in 
suicide attempt.



Interventions : Maintained a safe and supportive environment, provided clear and simple 
instructions, monitored behavior and provided intervention as needed, provided active 
listening and positive encouragement, encouraged independent performance of ADLs, and 
maintained Q 15min safety checks. 



Response : Patient is pleasant and cooperative with care; compliant with medication. 
Nicotine patch removed. He denies SI, HI, A/VH; no apparent delusions but conversations are 
tangential at times. Patient is mostly isolative to his room but out a little more this 
shift than previous night. He participated in HS snack in the community room prior to bed; 
observed sleeping and does not appear to be having difficulty. 



Plan : Patient continues to require a safe and supportive environment, mood continues to 
improve each day. Conservatorship continues to go forward.

## 2022-10-25 NOTE — NUR
NURSING PROGRESS NOTE



Problem : Per 5150 pt. is here for SA by OD on street drugs after command hallucinations 
telling him to do so. Pt. states that he took "Fentanyl, black china, meth, 8 ball" in 
suicide attempt.



Interventions : 1:1 assessment with therapeutic communication and active listening. 
Medication administration/ education/monitoring. Provided PRN's as needed for agitation. 
Maintained a safe and supportive environment. Monitored behavior and provided intervention 
as needed. Maintained Q 15min safety checks. 



Response: Pt confused this morning regarding his placement and when he is leaving. Pt 
tearful and angry. Pt able to calm down with PRN's. Pt states, "I'm going to cause my Mom a 
stroke." Then cried. Pt appears depressed. 



Plan: Patient continues to require a safe and supportive environment, mood continues to 
improve each day. Conservatorship continues to go forward.

## 2022-10-25 NOTE — NUR
NURSING PROGRESS NOTE



Problem : Per 5150 pt. is here for SA by OD on street drugs after command hallucinations 
telling him to do so. Pt. states that he took "Fentanyl, black china, meth, 8 ball" in 
suicide attempt.



Interventions : Maintained a safe and supportive environment, provided clear and simple 
instructions, monitored behavior and provided intervention as needed, provided active 
listening and positive encouragement, encouraged independent performance of ADLs, and 
maintained Q 15min safety checks. 



Response :  Patient is pleasant and cooperative with care; compliant with medication. Denies 
SI, HI, A/VH; no apparent delusions expressed. Patient reports "had a really great day" and 
smiling; appeared happy. He is watched TV and participated in HS snack in the community room 
prior to bed; observed sleeping and does not appear to be having difficulty. 



Plan : Patient continues to require a safe and supportive environment, mood continues to 
improve each day. Conservatorship continues to go forward. 

-------------------------------------------------------------------------------

Addendum: 10/25/22 at 0338 by Wen Devlin RN

-------------------------------------------------------------------------------

ENTERED IN ERROR; WRONG PATIENT.

## 2022-10-26 VITALS — SYSTOLIC BLOOD PRESSURE: 115 MMHG | DIASTOLIC BLOOD PRESSURE: 71 MMHG

## 2022-10-26 VITALS — SYSTOLIC BLOOD PRESSURE: 116 MMHG | DIASTOLIC BLOOD PRESSURE: 77 MMHG

## 2022-10-26 RX ADMIN — BENZTROPINE MESYLATE SCH MG: 1 TABLET ORAL at 20:28

## 2022-10-26 RX ADMIN — DOCUSATE SODIUM SCH MG: 100 CAPSULE, LIQUID FILLED ORAL at 07:34

## 2022-10-26 RX ADMIN — IBUPROFEN SCH MG: 400 TABLET, FILM COATED ORAL at 11:38

## 2022-10-26 RX ADMIN — NICOTINE SCH PATCH: 21 PATCH, EXTENDED RELEASE TRANSDERMAL at 08:37

## 2022-10-26 RX ADMIN — DIVALPROEX SODIUM SCH MG: 250 TABLET, DELAYED RELEASE ORAL at 07:35

## 2022-10-26 RX ADMIN — IBUPROFEN SCH MG: 400 TABLET, FILM COATED ORAL at 07:34

## 2022-10-26 RX ADMIN — NICOTINE POLACRILEX PRN MG: 2 LOZENGE ORAL at 17:36

## 2022-10-26 RX ADMIN — DIVALPROEX SODIUM SCH MG: 250 TABLET, DELAYED RELEASE ORAL at 20:28

## 2022-10-26 RX ADMIN — IBUPROFEN SCH MG: 400 TABLET, FILM COATED ORAL at 16:58

## 2022-10-26 RX ADMIN — BENZTROPINE MESYLATE SCH MG: 1 TABLET ORAL at 07:34

## 2022-10-26 RX ADMIN — NICOTINE POLACRILEX PRN MG: 2 LOZENGE ORAL at 10:59

## 2022-10-26 NOTE — NUR
Reassessment: Pt continues on Vegetarian diet while receiving double

protein TID, eating mostly 100% of meals meeting est needs at this time.

[facility] 10/23 receiving routine bowel care No change to recommendations at this time, will 
continue to monitor.

Recommendations:

1) Continue Vegetarian diet per pt request

2) Double protein TID

3) Bowel care PRN

4) Weekly scaled weights

-------------------------------------------------------------------------------

Addendum: 10/26/22 at 0754 by Loc Villalba RD

-------------------------------------------------------------------------------

Amended: Links added.

## 2022-10-26 NOTE — NUR
NURSING PROGRESS NOTE



Problem : Per 5150 pt. is here for SA by OD on street drugs after command hallucinations 
telling him to do so. Pt. states that he took "Fentanyl, black china, meth, 8 ball" in 
suicide attempt.



Interventions : 1:1 assessment with therapeutic communication and active listening. 
Medication administration/ education/monitoring. Provided PRN's as needed for agitation. 
Maintained a safe and supportive environment. Monitored behavior and provided intervention 
as needed. Maintained Q 15min safety checks. 



Response: RN received pt. asleep in bed at start of shift. Pt. awoke for coffee and took all 
medications. Pt. ate breakfast with peers in community room and went back to sleep. Pt. 
awoke mid-morning. 1:1 done at bedside, pt. denies all psych symptoms but  c/o feeling 
sedated most of the day due to the medications. Pt. states,  I dont know what Im going to 
do when I go to [place] because they make you go to groups all day and you cant sleep. 
Pt. reports feeling thankful for his mom and his son. Later in the day pt. c/o of anxiety 
and RN encouraged pt. to attempt to use mindfulness instead of ask for anxiolytic PRNs. RN 
did deep breathing with pt. and pt. reports it helped him to calm down. Pt. c/o anxiety 
throughout the day but did not request and PRN anxiolytics today. Pt. observed listening to 
headphones and pacing. Pt. observed socializing with peers. Pt. received Tylenol 650mg po x2 
for left ankle pain.



Plan: Patient continues to require a safe and supportive environment, mood continues to 
improve each day. Conservatorship continues to go forward.

## 2022-10-26 NOTE — NUR
NURSING PROGRESS NOTE



Problem : Per 5150 pt. is here for SA by OD on street drugs after command hallucinations 
telling him to do so. Pt. states that he took "Fentanyl, black china, meth, 8 ball" in 
suicide attempt.



Interventions : Maintained a safe and supportive environment, provided clear and simple 
instructions, monitored behavior and provided intervention as needed, provided active 
listening and positive encouragement, encouraged independent performance of ADLs, and 
maintained Q 15min safety checks. 



Response : Patient is pleasant and cooperative with care; compliant with medication. 
Nicotine patch removed. He denies SI, HI, A/VH; overheard talking a lot about 
spirituality/Yarsani with female peer. He expressed some irritability r/t not being able to 
talk with his mom. Patient participated in HS snack prior to bed; observed sleeping and does 
not appear to be having difficult. 



Plan : Patient continues to require a safe and supportive environment, mood continues to 
improve each day. Conservatorship continues to go forward.

## 2022-10-27 VITALS — DIASTOLIC BLOOD PRESSURE: 63 MMHG | SYSTOLIC BLOOD PRESSURE: 124 MMHG

## 2022-10-27 VITALS — DIASTOLIC BLOOD PRESSURE: 77 MMHG | SYSTOLIC BLOOD PRESSURE: 122 MMHG

## 2022-10-27 RX ADMIN — DIVALPROEX SODIUM SCH MG: 250 TABLET, DELAYED RELEASE ORAL at 07:13

## 2022-10-27 RX ADMIN — DIVALPROEX SODIUM SCH MG: 250 TABLET, DELAYED RELEASE ORAL at 20:12

## 2022-10-27 RX ADMIN — NICOTINE POLACRILEX PRN MG: 2 LOZENGE ORAL at 13:52

## 2022-10-27 RX ADMIN — DOCUSATE SODIUM SCH MG: 100 CAPSULE, LIQUID FILLED ORAL at 07:12

## 2022-10-27 RX ADMIN — NICOTINE POLACRILEX PRN MG: 2 LOZENGE ORAL at 07:41

## 2022-10-27 RX ADMIN — BENZTROPINE MESYLATE SCH MG: 1 TABLET ORAL at 20:13

## 2022-10-27 RX ADMIN — BENZTROPINE MESYLATE SCH MG: 1 TABLET ORAL at 07:13

## 2022-10-27 RX ADMIN — NICOTINE SCH PATCH: 21 PATCH, EXTENDED RELEASE TRANSDERMAL at 07:14

## 2022-10-27 RX ADMIN — IBUPROFEN SCH MG: 400 TABLET, FILM COATED ORAL at 16:22

## 2022-10-27 RX ADMIN — IBUPROFEN SCH MG: 400 TABLET, FILM COATED ORAL at 07:41

## 2022-10-27 RX ADMIN — IBUPROFEN SCH MG: 400 TABLET, FILM COATED ORAL at 11:28

## 2022-10-27 NOTE — NUR
Nursing Progress Note: Mike



Problem: 

Per 5150 pt. is here for SA by OD on street drugs after command hallucinations telling him 
to do so. Pt. states that he took "Fentanyl, black china, meth, 8 ball" in suicide attempt.

Interventions: 

Maintained a safe and supportive environment, ensured contract for safety, provided clear 
and simple instructions, attempted to orient to reality, monitored behavior and provided 
intervention as needed, provided active listening and positive encouragement, encouraged 
independent performance of ADLs, and maintained Q 15min safety checks. 



Response: 

Received pt. asleep, he woke to attend breakfast. He denies SI, HI, A/VH and reports Im 
ready to go to [place] or something Pt. presents as upbeat this morning. He ate breakfast 
in the main dining room and socializes with cohorts. Pt. spent most of the morning walking 
around the unit and listening to headphones. Later staff alerted this writer to hearing 
teeth clenching from pt. Spoke to pt. and found he was angry and agitated, and he reported 
That damn girl was saying things to me, telling me shit Writer was able to speak to pt. 
for a while and encouraged pt. to use coping skills to deal with stressors; he was able to 
calm himself down. Pt. has good hygiene, well groomed, and wearing street clothes. Pt. 
reported bilateral ankle pain and received PRN Tylenol which was not effective. He presented 
as agitated pacing the unit d/t concerns of gaining 70 lbs from the medicine he then 
started perseverating on paperwork he signed and threw on the floor two weeks ago; PRN 
Klonopin and Tramadol given. 



Plan:

Per provider, pt. continues to require a safe and supportive environment. Conservatorship is 
recommended.

## 2022-10-27 NOTE — NUR
NURSING PROGRESS NOTE



Problem : Per 5150 pt. is here for SA by OD on street drugs after command hallucinations 
telling him to do so. Pt. states that he took "Fentanyl, black china, meth, 8 ball" in 
suicide attempt.



Interventions : Maintained a safe and supportive environment, provided clear and simple 
instructions, monitored behavior and provided intervention as needed, provided active 
listening and positive encouragement, encouraged independent performance of ADLs, and 
maintained Q 15min safety checks. 



Response : Patient is pleasant and cooperative with care; compliant with medication. PRN 
Clonazepam provided per patient request. He reported he removed his Nicotine patch prior to 
med pass. Patient denies SI, HI, A/VH; no apparent delusions expressed. He was mostly self 
isolative but joking and social with writer. Patient expressed some concern over weight gain 
this shift. He is observed sleeping and does not appear to be having difficulty. 



Plan : Patient continues to require a safe and supportive environment, mood continues to 
improve each day. Conservatorship continues to go forward.

## 2022-10-28 VITALS — DIASTOLIC BLOOD PRESSURE: 72 MMHG | SYSTOLIC BLOOD PRESSURE: 115 MMHG

## 2022-10-28 VITALS — DIASTOLIC BLOOD PRESSURE: 61 MMHG | SYSTOLIC BLOOD PRESSURE: 136 MMHG

## 2022-10-28 RX ADMIN — BENZTROPINE MESYLATE SCH MG: 1 TABLET ORAL at 07:02

## 2022-10-28 RX ADMIN — DOCUSATE SODIUM SCH MG: 100 CAPSULE, LIQUID FILLED ORAL at 07:02

## 2022-10-28 RX ADMIN — BENZTROPINE MESYLATE SCH MG: 1 TABLET ORAL at 19:11

## 2022-10-28 RX ADMIN — IBUPROFEN SCH MG: 400 TABLET, FILM COATED ORAL at 17:33

## 2022-10-28 RX ADMIN — NICOTINE SCH PATCH: 21 PATCH, EXTENDED RELEASE TRANSDERMAL at 07:01

## 2022-10-28 RX ADMIN — IBUPROFEN SCH MG: 400 TABLET, FILM COATED ORAL at 08:33

## 2022-10-28 RX ADMIN — DIVALPROEX SODIUM SCH MG: 250 TABLET, DELAYED RELEASE ORAL at 07:02

## 2022-10-28 RX ADMIN — NICOTINE POLACRILEX PRN MG: 2 LOZENGE ORAL at 13:23

## 2022-10-28 RX ADMIN — DIVALPROEX SODIUM SCH MG: 250 TABLET, DELAYED RELEASE ORAL at 19:11

## 2022-10-28 RX ADMIN — IBUPROFEN SCH MG: 400 TABLET, FILM COATED ORAL at 12:29

## 2022-10-28 NOTE — NUR
NURSING PROGRESS NOTE



Problem : Per 5150 pt. is here for SA by OD on street drugs after command hallucinations 
telling him to do so. Pt. states that he took "Fentanyl, black china, meth, 8 ball" in 
suicide attempt.



Interventions : Maintained a safe and supportive environment, provided clear and simple 
instructions, monitored behavior and provided intervention as needed, provided active 
listening and positive encouragement, encouraged independent performance of ADLs, and 
maintained Q 15min safety checks. 



Response : Patient is pleasant and cooperative with care; compliant with medication. PRN 
Tylenol provided for ankle pain and Nicotine patch removed. He denies SI, HI, A/VH; no 
apparent delusions expressed this shift. Patient continues to perseverate on weight gain and 
reported he was working out in his room prior to bed; observed sleeping and does not appear 
to be having difficulty. 



Plan : Patient continues to require a safe and supportive environment, mood continues to 
improve each day. Conservatorship continues to go forward.

## 2022-10-28 NOTE — NUR
NURSING PROGRESS NOTE:



Problem:  Per 5150 pt. is here for SA by OD on street drugs after command hallucinations 
telling him to do so. Pt. states that he took "Fentanyl, black china, meth, 8 ball" in 
suicide attempt. Pt. states he feels hopeless. Pt. is also delusional, stating, "they won't 
give me my billion dollars". Pt. states, "I tried killing myself because Im a free renetta. 
They dont love me but I love them. Yes I do."



Interventions:  Maintained a safe and supportive environment, provided foot care per wound 
care orders, provided clear and simple instructions, attempted to orient to reality, 
monitored behavior and provided intervention as needed, provided active listening and 
positive encouragement, encouraged independent performance of ADLs, and maintained Q 15min 
safety checks. 



Response:  Patient was up for breakfast and compliant with morning med pass. Hes pleasant 
and cooperative with staff, whether pacing or in the community room. His mood can be labile 
depending on whats happening on the unit. Patient has a lot of delusional content that is 
evil, which makes it hard for him to believe hes a good person, worthy of love. Positive 
encouragement works, but there are setbacks that can be hard to overcome.







Plan: Patients continues to be labile, however moods are improving with medication 
compliance. Pt continues to require a safe and supportive environment. Pts conservatorship 
is still going forward.

## 2022-10-29 VITALS — SYSTOLIC BLOOD PRESSURE: 107 MMHG | DIASTOLIC BLOOD PRESSURE: 67 MMHG

## 2022-10-29 VITALS — DIASTOLIC BLOOD PRESSURE: 47 MMHG | SYSTOLIC BLOOD PRESSURE: 111 MMHG

## 2022-10-29 RX ADMIN — NICOTINE POLACRILEX PRN MG: 2 LOZENGE ORAL at 07:53

## 2022-10-29 RX ADMIN — BENZTROPINE MESYLATE SCH MG: 1 TABLET ORAL at 07:27

## 2022-10-29 RX ADMIN — IBUPROFEN SCH MG: 400 TABLET, FILM COATED ORAL at 07:27

## 2022-10-29 RX ADMIN — NICOTINE POLACRILEX PRN MG: 2 LOZENGE ORAL at 19:15

## 2022-10-29 RX ADMIN — IBUPROFEN SCH MG: 400 TABLET, FILM COATED ORAL at 12:18

## 2022-10-29 RX ADMIN — BENZTROPINE MESYLATE SCH MG: 1 TABLET ORAL at 20:14

## 2022-10-29 RX ADMIN — DOCUSATE SODIUM SCH MG: 100 CAPSULE, LIQUID FILLED ORAL at 07:26

## 2022-10-29 RX ADMIN — DIVALPROEX SODIUM SCH MG: 250 TABLET, DELAYED RELEASE ORAL at 20:14

## 2022-10-29 RX ADMIN — NICOTINE SCH PATCH: 21 PATCH, EXTENDED RELEASE TRANSDERMAL at 07:31

## 2022-10-29 RX ADMIN — DIVALPROEX SODIUM SCH MG: 250 TABLET, DELAYED RELEASE ORAL at 07:27

## 2022-10-29 RX ADMIN — IBUPROFEN SCH MG: 400 TABLET, FILM COATED ORAL at 17:18

## 2022-10-29 RX ADMIN — NICOTINE POLACRILEX PRN MG: 2 LOZENGE ORAL at 11:49

## 2022-10-29 NOTE — NUR
Nursing Progress Note:   



Problem : 

Per 5150 pt. is here for SA by OD on street drugs after command hallucinations telling him 
to do so. Pt. states that he took "Fentanyl, black china, meth, 8 ball" in suicide attempt.



Interventions : 

Maintained a safe and supportive environment, ensured contract for safety, provided clear 
and simple instructions, attempted to orient to reality, monitored behavior and provided 
intervention as needed, provided active listening and positive encouragement, encouraged 
independent performance of ADLs, and maintained Q 15min safety checks. 



Response : 

Received Pt in bed sleeping w/o distress. Pt woke and was cooperative with vitals and 
returned to sleeping. Pt woke again for breakfast and ate well, and took AM meds early and 
w/o issue. Pt in pleasant mood overall, but spoke of daydreams about the end of the world 
and Armageddon. He requested Tylenol for pain X2 this shift, once for HA and the other for 
knee pain. Pt interested in football scores and joked with staff throughout the day. Pt used 
nicotine Lozenges X2. He ate meals well with others and interacted appropriately. Pt did not 
have any loud or aggressive outbursts today.



Plan :

 Per Dr. Davalos, pt. continues to require a safe and supportive environment. 
Conservatorship is recommended.

## 2022-10-29 NOTE — NUR
Nursing Progress Note



Problem : 

Per 5150 pt. is here for SA by OD on street drugs after command hallucinations telling him 
to do so. Pt. states that he took "Fentanyl, black china, meth, 8 ball" in suicide attempt.



Interventions : 

Maintained a safe and supportive environment, ensured contract for safety, provided clear 
and simple instructions, attempted to orient to reality, monitored behavior and provided 
intervention as needed, provided active listening and positive encouragement, encouraged 
independent performance of ADLs, and maintained Q 15min safety checks. 



Response : 

Received Pt in community room watching TV after dinner. Pt cooperative with vitals and asked 
for a nicotine jay. Pt apologized for agitation earlier and we were able to talk about how 
he handled his emotions well. Pt engaged in assessments and enjoyed snack. He received a PRN 
for anxiety and took PM meds w/o issue. He reported feeling more calm and expressed 
frustrations in a clear normal conversation. Pt sleeping at this time w/o distress. 



Plan :

 Per Dr. Davalos, pt. continues to require a safe and supportive environment. 
Conservatorship is recommended.

## 2022-10-30 VITALS — SYSTOLIC BLOOD PRESSURE: 107 MMHG | DIASTOLIC BLOOD PRESSURE: 70 MMHG

## 2022-10-30 VITALS — DIASTOLIC BLOOD PRESSURE: 67 MMHG | SYSTOLIC BLOOD PRESSURE: 122 MMHG

## 2022-10-30 RX ADMIN — IBUPROFEN SCH MG: 400 TABLET, FILM COATED ORAL at 17:21

## 2022-10-30 RX ADMIN — NICOTINE POLACRILEX PRN MG: 2 LOZENGE ORAL at 10:34

## 2022-10-30 RX ADMIN — NICOTINE POLACRILEX PRN MG: 2 LOZENGE ORAL at 17:20

## 2022-10-30 RX ADMIN — BENZTROPINE MESYLATE SCH MG: 1 TABLET ORAL at 20:28

## 2022-10-30 RX ADMIN — IBUPROFEN SCH MG: 400 TABLET, FILM COATED ORAL at 07:40

## 2022-10-30 RX ADMIN — IBUPROFEN SCH MG: 400 TABLET, FILM COATED ORAL at 11:33

## 2022-10-30 RX ADMIN — DOCUSATE SODIUM SCH MG: 100 CAPSULE, LIQUID FILLED ORAL at 07:39

## 2022-10-30 RX ADMIN — DIVALPROEX SODIUM SCH MG: 250 TABLET, DELAYED RELEASE ORAL at 07:39

## 2022-10-30 RX ADMIN — NICOTINE SCH PATCH: 21 PATCH, EXTENDED RELEASE TRANSDERMAL at 07:38

## 2022-10-30 RX ADMIN — BENZTROPINE MESYLATE SCH MG: 1 TABLET ORAL at 07:38

## 2022-10-30 RX ADMIN — DIVALPROEX SODIUM SCH MG: 250 TABLET, DELAYED RELEASE ORAL at 20:30

## 2022-10-30 NOTE — NUR
Nursing Progress Note:



Problem:  Per 5150 pt. is here for SA by OD on street drugs after command hallucinations 
telling him to do so. Pt. states that he took "Fentanyl, black china, meth, 8 ball" in 
suicide attempt. Pt. states he feels hopeless. Pt. is also delusional, stating, "They won't 
give me my billion dollars". Pt. states, "I tried killing myself because Im a free renetta. 
They dont love me but I love them. Yes I do."



Interventions:  Maintained a safe and supportive environment, provided foot care per wound 
care orders, provided clear and simple instructions, attempted to orient to reality, 
monitored behavior and provided intervention as needed, provided active listening and 
positive encouragement, encouraged independent performance of ADLs, and maintained Q 15min 
safety checks. 



Response:  Patient was up for breakfast and AM med pass. He continues to be compliant with 
medications, and hasnt requested any PRNs today. This morning another patient was agitated 
and yelling, but the patient held it together all dayeven when his football team lost. This 
morning he appeared depressed, and avoided eye contact. This afternoon hes smiling and more 
amiable. Patient denies all MH symptoms, and none have been observed. He hasnt made any 
delusional statements, and conversation has been linear, although he is sometimes hard to 
understand.



Plan: Patients continues to be labile, however moods are improving with medication 
compliance. Pt continues to require a safe and supportive environment. Pts conservatorship 
is still going forward

## 2022-10-31 VITALS — SYSTOLIC BLOOD PRESSURE: 125 MMHG | DIASTOLIC BLOOD PRESSURE: 71 MMHG

## 2022-10-31 VITALS — DIASTOLIC BLOOD PRESSURE: 67 MMHG | SYSTOLIC BLOOD PRESSURE: 98 MMHG

## 2022-10-31 RX ADMIN — DOCUSATE SODIUM SCH MG: 100 CAPSULE, LIQUID FILLED ORAL at 07:52

## 2022-10-31 RX ADMIN — BENZTROPINE MESYLATE SCH MG: 1 TABLET ORAL at 20:22

## 2022-10-31 RX ADMIN — NICOTINE POLACRILEX PRN MG: 2 LOZENGE ORAL at 12:57

## 2022-10-31 RX ADMIN — IBUPROFEN SCH MG: 400 TABLET, FILM COATED ORAL at 16:47

## 2022-10-31 RX ADMIN — BENZTROPINE MESYLATE SCH MG: 1 TABLET ORAL at 07:52

## 2022-10-31 RX ADMIN — IBUPROFEN SCH MG: 400 TABLET, FILM COATED ORAL at 11:57

## 2022-10-31 RX ADMIN — NICOTINE POLACRILEX PRN MG: 2 LOZENGE ORAL at 17:06

## 2022-10-31 RX ADMIN — NICOTINE SCH PATCH: 21 PATCH, EXTENDED RELEASE TRANSDERMAL at 07:59

## 2022-10-31 RX ADMIN — IBUPROFEN SCH MG: 400 TABLET, FILM COATED ORAL at 07:52

## 2022-10-31 RX ADMIN — DIVALPROEX SODIUM SCH MG: 250 TABLET, DELAYED RELEASE ORAL at 07:51

## 2022-10-31 RX ADMIN — DIVALPROEX SODIUM SCH MG: 250 TABLET, DELAYED RELEASE ORAL at 20:22

## 2022-10-31 NOTE — NUR
Nursing Progress Note:



Problem:  Per 5150 pt. is here for SA by OD on street drugs after command hallucinations 
telling him to do so. Pt. states that he took "Fentanyl, black china, meth, 8 ball" in 
suicide attempt. Pt. states he feels hopeless. Pt. is also delusional, stating, "They won't 
give me my billion dollars". Pt. states, "I tried killing myself because Im a free renetta. 
They dont love me but I love them. Yes I do."



Interventions:  Maintained a safe and supportive environment, provided foot care per wound 
care orders, provided clear and simple instructions, attempted to orient to reality, 
monitored behavior and provided intervention as needed, provided active listening and 
positive encouragement, encouraged independent performance of ADLs, and maintained Q 15min 
safety checks. 



Response:  Patient is pleasant and cooperative with care; compliant with medication. He 
reported he removed his Nicotine prior to med pass. He denies SI, HI, a/VH; no apparent 
delusions expressed. Patient reported he is feeling anxious d/t upcoming court date as well 
as feeling depressed. Patient is social with staff and briefly walking the unit this shift. 
He briefly participated in HS snack prior to bed; observed sleeping and does not appear to 
be having difficulty. 



Plan: Patients continues to be labile, however moods are improving with medication 
compliance. Pt continues to require a safe and supportive environment. Pts conservatorship 
is still going forward

## 2022-10-31 NOTE — NUR
Nursing Progress Note:   



Problem : 

Per 5150 pt. is here for SA by OD on street drugs after command hallucinations telling him 
to do so. Pt. states that he took "Fentanyl, black china, meth, 8 ball" in suicide attempt.



Interventions : 

Maintained a safe and supportive environment, ensured contract for safety, provided clear 
and simple instructions, attempted to orient to reality, monitored behavior and provided 
intervention as needed, provided active listening and positive encouragement, encouraged 
independent performance of ADLs, and maintained Q 15min safety checks. 



Response : 

RN received pt. asleep in bed at start of shift. Pt. awoke and took all medications. Pt. ate 
all meal sin community room. Pt. pacing the hallways and c/o anxiety, pt. took scheduled 
Ativan and Haldol at 11AM with minimal effect. Pt. attempted to attend group but c/o of 
feeling too agitated. Pt. became agitated after he was not allowed to have a hot chocolate 
after scheduled snack time. Pt. slammed his door shouting, Im possessed by the devil!. 
Pt. given PRN Klonopin 2mg with good effect. Pt. later apologized to staff for his behavior. 
Pt. napped for 2 hours in the afternoon. Pt. awoke and requested headphones, pt. continued 
pacing. Pt. c/o left knee pain and requested Tylenol. Pt. received 650mg with good effect.



Plan :

 Per Dr. Davlaos, pt. continues to require a safe and supportive environment. 
Conservatorship is recommended.

## 2022-10-31 NOTE — NUR
Nursing Progress Note:   



Problem : 

Per 5150 pt. is here for SA by OD on street drugs after command hallucinations telling him 
to do so. Pt. states that he took "Fentanyl, black china, meth, 8 ball" in suicide attempt.



Interventions : 

Maintained a safe and supportive environment, ensured contract for safety, provided clear 
and simple instructions, attempted to orient to reality, monitored behavior and provided 
intervention as needed, provided active listening and positive encouragement, encouraged 
independent performance of ADLs, and maintained Q 15min safety checks. 



Response : 

Pt spent time pacing in the singh at change of shift. He socialized in the group room during 
the evening. Pt reports having a bad day and is remorseful for the way he was acting. Pt 
reports feeling frustrated with his "humboldt conservator" states he doesnt feel like he is 
getting the right things, he wants new clothes and a place to go. Pt took HS meds after 
having an evening snack and went to bed. 



Plan :

 Per Dr. Davalos, pt. continues to require a safe and supportive environment. 
Conservatorship is recommended.

## 2022-11-01 VITALS — SYSTOLIC BLOOD PRESSURE: 117 MMHG | DIASTOLIC BLOOD PRESSURE: 67 MMHG

## 2022-11-01 VITALS — DIASTOLIC BLOOD PRESSURE: 71 MMHG | SYSTOLIC BLOOD PRESSURE: 113 MMHG

## 2022-11-01 RX ADMIN — NICOTINE SCH PATCH: 21 PATCH, EXTENDED RELEASE TRANSDERMAL at 08:09

## 2022-11-01 RX ADMIN — NICOTINE POLACRILEX PRN MG: 2 LOZENGE ORAL at 08:27

## 2022-11-01 RX ADMIN — DOCUSATE SODIUM SCH MG: 100 CAPSULE, LIQUID FILLED ORAL at 08:03

## 2022-11-01 RX ADMIN — IBUPROFEN SCH MG: 400 TABLET, FILM COATED ORAL at 08:04

## 2022-11-01 RX ADMIN — BENZTROPINE MESYLATE SCH MG: 1 TABLET ORAL at 20:09

## 2022-11-01 RX ADMIN — BENZTROPINE MESYLATE SCH MG: 1 TABLET ORAL at 08:03

## 2022-11-01 RX ADMIN — IBUPROFEN SCH MG: 400 TABLET, FILM COATED ORAL at 11:30

## 2022-11-01 RX ADMIN — IBUPROFEN SCH MG: 400 TABLET, FILM COATED ORAL at 17:28

## 2022-11-01 RX ADMIN — DIVALPROEX SODIUM SCH MG: 250 TABLET, DELAYED RELEASE ORAL at 20:09

## 2022-11-01 RX ADMIN — DIVALPROEX SODIUM SCH MG: 250 TABLET, DELAYED RELEASE ORAL at 08:04

## 2022-11-01 NOTE — NUR
Reassessment: Pt continues eating well, documented with mostly 100% PO intake while 
receiving double protein TID. LBM 10/29, receiving routine bowel care with PRN bowel care 
available. No nutrition intervention implemented at this time. Will continue to follow.

Recommendations:

1) Continue Vegetarian diet per pt request

2) Double protein TID

3) Bowel care PRN

4) Weekly scaled weights

-------------------------------------------------------------------------------

Addendum: 11/01/22 at 0956 by Venita Choudhary RD

-------------------------------------------------------------------------------

Amended: Links added.

## 2022-11-01 NOTE — NUR
Nursing Progress Note:   



Problem : 

Per 5150 pt. is here for SA by OD on street drugs after command hallucinations telling him 
to do so. Pt. states that he took "Fentanyl, black china, meth, 8 ball" in suicide attempt.



Interventions : 

Maintained a safe and supportive environment, ensured contract for safety, provided clear 
and simple instructions, attempted to orient to reality, monitored behavior and provided 
intervention as needed, provided active listening and positive encouragement, encouraged 
independent performance of ADLs, and maintained Q 15min safety checks. 



Response : 

RN received pt. asleep in bed at start of shift. Pt. awoke and took all medications. Pt. ate 
all meal sin community room. Pt. pacing the hallways and c/o anxiety, pt. took scheduled 
Ativan and Haldol at 11AM with minimal effect. Pt. had an outburst of yelling, RN offered 
pt. anxiolytic but pt. refused along with scheduled Seroquel. Pt. states, Can you just kill 
me? I just want to die!. Pt. pacing the hallway and shouting, **** all of you! I dont 
care about anyone! I dont care about my son! I dont care about my mom! Pt. then drank a  
bottle of hand-soap, stating, I hope this kills me. Poison control was called and 
instructions were to monitor pt. for hypovolemia. Pt. had several bouts of emesis but soon 
afterward pt. is observed eating and drinking and able to hold down fluids. Pt. eventually 
asked for Klonopin and received 2mg along with late dose of scheduled Seroquel. Pt. pacing 
hallway. 



Plan :

 Per Dr. Davalos, pt. continues to require a safe and supportive environment. 
Conservatorship is recommended.

## 2022-11-01 NOTE — NUR
COURT



[place] does not need to attend court on 11/3/22. It is a status hearing. His next court 
date will be determined on 11/3/22 and Public Guardian will apprise writer.



SIDDHARTHA Salamanca

## 2022-11-01 NOTE — NUR
THERAPEUTIC GROUP

Client came into group room sporadically. Group focus was resilience and responding to 
change, specifically psychosocial education about change-related anxiety, and coping 
mechanisms. Client was distressed and angry, as evidenced by facial scowl, pacing, abrupt 
movements, pallor. Client did not speak.

## 2022-11-01 NOTE — NUR
POISON CONTROL



Called poison control for advisement on pt drinking a bottle of antibacterial hand soap. 
Poison control stated the concentration of benzalkonium chloride in the soap was low, and 
will only cause GI upset, vomiting, diarrhea. If vomiting persists, monitor hydration.

## 2022-11-02 VITALS — DIASTOLIC BLOOD PRESSURE: 59 MMHG | SYSTOLIC BLOOD PRESSURE: 114 MMHG

## 2022-11-02 VITALS — DIASTOLIC BLOOD PRESSURE: 67 MMHG | SYSTOLIC BLOOD PRESSURE: 122 MMHG

## 2022-11-02 RX ADMIN — IBUPROFEN SCH MG: 400 TABLET, FILM COATED ORAL at 17:17

## 2022-11-02 RX ADMIN — BENZTROPINE MESYLATE SCH MG: 1 TABLET ORAL at 07:39

## 2022-11-02 RX ADMIN — DOCUSATE SODIUM SCH MG: 100 CAPSULE, LIQUID FILLED ORAL at 07:39

## 2022-11-02 RX ADMIN — IBUPROFEN SCH MG: 400 TABLET, FILM COATED ORAL at 12:57

## 2022-11-02 RX ADMIN — NICOTINE SCH PATCH: 21 PATCH, EXTENDED RELEASE TRANSDERMAL at 11:27

## 2022-11-02 RX ADMIN — DIVALPROEX SODIUM SCH MG: 250 TABLET, DELAYED RELEASE ORAL at 20:20

## 2022-11-02 RX ADMIN — BENZTROPINE MESYLATE SCH MG: 1 TABLET ORAL at 20:20

## 2022-11-02 RX ADMIN — DIVALPROEX SODIUM SCH MG: 250 TABLET, DELAYED RELEASE ORAL at 07:39

## 2022-11-02 RX ADMIN — NICOTINE POLACRILEX PRN MG: 2 LOZENGE ORAL at 11:27

## 2022-11-02 RX ADMIN — IBUPROFEN SCH MG: 400 TABLET, FILM COATED ORAL at 07:39

## 2022-11-02 RX ADMIN — NICOTINE POLACRILEX PRN MG: 2 LOZENGE ORAL at 08:09

## 2022-11-02 NOTE — NUR
Nursing Progress Note:   



Problem : 

Per 5150 pt. is here for SA by OD on street drugs after command hallucinations telling him 
to do so. Pt. states that he took "Fentanyl, black china, meth, 8 ball" in suicide attempt.



Interventions : 

Maintained a safe and supportive environment, ensured contract for safety, provided clear 
and simple instructions, attempted to orient to reality, monitored behavior and provided 
intervention as needed, provided active listening and positive encouragement, encouraged 
independent performance of ADLs, and maintained Q 15min safety checks. 



Response : 

RN received pt. asleep in bed at start of shift. Pt. awoke and took all medications. Pt. ate 
all meals iin community room. Pt. pacing the hallways and c/o anxiety, pt. took scheduled 
Ativan and Haldol at 11AM with moderate effect. In early afternoon pt. requesting 
anxiolytics and received Klonopin 2mg po and Haldol 5mg po with moderate effect. Pt. 
socializing with peers and pacing halls while listening to music on headphones. After dinner 
pt. pacing hallway and asking for more anxiolytics. Pt. states, What do I have to do to get 
out of here and go back to [place]? Actually I think I would take [place] even.  



Plan :

 Per Dr. Davalos, pt. continues to require a safe and supportive environment. 
Conservatorship is recommended.
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
## 2022-11-03 VITALS — DIASTOLIC BLOOD PRESSURE: 67 MMHG | SYSTOLIC BLOOD PRESSURE: 133 MMHG

## 2022-11-03 VITALS — DIASTOLIC BLOOD PRESSURE: 88 MMHG | SYSTOLIC BLOOD PRESSURE: 132 MMHG

## 2022-11-03 RX ADMIN — IBUPROFEN SCH MG: 400 TABLET, FILM COATED ORAL at 17:05

## 2022-11-03 RX ADMIN — DOCUSATE SODIUM SCH MG: 100 CAPSULE, LIQUID FILLED ORAL at 08:07

## 2022-11-03 RX ADMIN — NICOTINE SCH PATCH: 21 PATCH, EXTENDED RELEASE TRANSDERMAL at 08:07

## 2022-11-03 RX ADMIN — BENZTROPINE MESYLATE SCH MG: 1 TABLET ORAL at 08:06

## 2022-11-03 RX ADMIN — IBUPROFEN SCH MG: 400 TABLET, FILM COATED ORAL at 12:41

## 2022-11-03 RX ADMIN — IBUPROFEN SCH MG: 400 TABLET, FILM COATED ORAL at 08:07

## 2022-11-03 RX ADMIN — DIVALPROEX SODIUM SCH MG: 250 TABLET, DELAYED RELEASE ORAL at 20:21

## 2022-11-03 RX ADMIN — NICOTINE POLACRILEX PRN MG: 2 LOZENGE ORAL at 19:09

## 2022-11-03 RX ADMIN — BENZTROPINE MESYLATE SCH MG: 1 TABLET ORAL at 20:22

## 2022-11-03 RX ADMIN — DIVALPROEX SODIUM SCH MG: 250 TABLET, DELAYED RELEASE ORAL at 08:06

## 2022-11-03 RX ADMIN — NICOTINE POLACRILEX PRN MG: 2 LOZENGE ORAL at 10:39
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
## 2022-11-04 VITALS — SYSTOLIC BLOOD PRESSURE: 113 MMHG | DIASTOLIC BLOOD PRESSURE: 83 MMHG

## 2022-11-04 VITALS — SYSTOLIC BLOOD PRESSURE: 130 MMHG | DIASTOLIC BLOOD PRESSURE: 84 MMHG

## 2022-11-04 RX ADMIN — DIVALPROEX SODIUM SCH MG: 250 TABLET, DELAYED RELEASE ORAL at 20:29

## 2022-11-04 RX ADMIN — IBUPROFEN SCH MG: 400 TABLET, FILM COATED ORAL at 17:27

## 2022-11-04 RX ADMIN — IBUPROFEN SCH MG: 400 TABLET, FILM COATED ORAL at 07:33

## 2022-11-04 RX ADMIN — DOCUSATE SODIUM SCH MG: 100 CAPSULE, LIQUID FILLED ORAL at 07:22

## 2022-11-04 RX ADMIN — IBUPROFEN SCH MG: 400 TABLET, FILM COATED ORAL at 12:33

## 2022-11-04 RX ADMIN — DIVALPROEX SODIUM SCH MG: 250 TABLET, DELAYED RELEASE ORAL at 07:23

## 2022-11-04 RX ADMIN — BENZTROPINE MESYLATE SCH MG: 1 TABLET ORAL at 07:23

## 2022-11-04 RX ADMIN — NICOTINE POLACRILEX PRN MG: 2 LOZENGE ORAL at 18:43

## 2022-11-04 RX ADMIN — BENZTROPINE MESYLATE SCH MG: 1 TABLET ORAL at 20:29

## 2022-11-04 RX ADMIN — NICOTINE SCH PATCH: 21 PATCH, EXTENDED RELEASE TRANSDERMAL at 07:27
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
## 2022-11-04 NOTE — NUR
PLACEMENT UPDATE



Sent updated notes to Jose Juan Gross with [facility] for placement 
purposes.



Emailed and left him a voicemail asking him when Mike's next court date is. Still 
waiting for a response.



Phone# [phone]

Email: [email or web address]

## 2022-11-04 NOTE — NUR
Nursing Progress Note      



Problem: Per 5150 pt. is here for SA by OD on street drugs after command hallucinations 
telling him to do so. Pt. states that he took "Fentanyl, black china, meth, 8 ball" in 
suicide attempt. Pt. states he feels hopeless. Pt. is also delusional, stating, "they won't 
give me my billion dollars". Pt. states, "I tried killing myself because Im a free renetta. 
They dont love me but I love them. Yes I do."



Interventions: 1:1 assessment, medication administration/education/monitoring, therapeutic 
conversation, active listening, ensured contract for safety, provided distraction, 
redirection, encouragement, positive reinforcement, and Q15 minute safety checks.



Response:  Pt was up before breakfast asking for his meds. Pt was pleasant and cooperative 
with medication and assessments. Pt denied SI/HI/AH/VH. Pt paces the unit listening to radio 
headphones. Pt likes listening to Ene Nguyen, Ana Cifuentes, and Diego. Pt is restless at 
times, he does not like to sit or lie down for too long during the day despite his legs 
becoming sore from it at times. Pt requested PRN Tylenol 650 mg for 2/10 left posterior knee 
pain at 1041 with good effect. Pt also takes routine Motrin. Lotion applied to dry skin on 
feet and calloused great toes, no open areas noted. Pt becomes anxious at times when he 
thinks about wishing to be discharged somewhere. Pt is able to calm himself and easily 
redirectable. 



Plan: Crisis interruption and stabilization in a safe and therapeutic environment. Pt is on 
a TCON. Pt is awaiting for his county to arrange a court date.

## 2022-11-05 VITALS — SYSTOLIC BLOOD PRESSURE: 139 MMHG | DIASTOLIC BLOOD PRESSURE: 81 MMHG

## 2022-11-05 VITALS — DIASTOLIC BLOOD PRESSURE: 53 MMHG | SYSTOLIC BLOOD PRESSURE: 112 MMHG

## 2022-11-05 RX ADMIN — IBUPROFEN SCH MG: 400 TABLET, FILM COATED ORAL at 12:53

## 2022-11-05 RX ADMIN — NICOTINE SCH PATCH: 21 PATCH, EXTENDED RELEASE TRANSDERMAL at 07:15

## 2022-11-05 RX ADMIN — BENZTROPINE MESYLATE SCH MG: 1 TABLET ORAL at 07:17

## 2022-11-05 RX ADMIN — IBUPROFEN SCH MG: 400 TABLET, FILM COATED ORAL at 07:36

## 2022-11-05 RX ADMIN — DOCUSATE SODIUM SCH MG: 100 CAPSULE, LIQUID FILLED ORAL at 07:16

## 2022-11-05 RX ADMIN — IBUPROFEN SCH MG: 400 TABLET, FILM COATED ORAL at 17:10

## 2022-11-05 RX ADMIN — BENZTROPINE MESYLATE SCH MG: 1 TABLET ORAL at 20:30

## 2022-11-05 RX ADMIN — DIVALPROEX SODIUM SCH MG: 250 TABLET, DELAYED RELEASE ORAL at 20:29

## 2022-11-05 RX ADMIN — DIVALPROEX SODIUM SCH MG: 250 TABLET, DELAYED RELEASE ORAL at 07:17
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
## 2022-11-05 NOTE — NUR
Nursing Progress Note      



Problem: Per 5150 pt. is here for SA by OD on street drugs after command hallucinations 
telling him to do so. Pt. states that he took "Fentanyl, black china, meth, 8 ball" in 
suicide attempt. Pt. states he feels hopeless. Pt. is also delusional, stating, "they won't 
give me my billion dollars". Pt. states, "I tried killing myself because Im a free renetta. 
They dont love me but I love them. Yes I do."



Interventions: 1:1 assessment, medication administration/education/monitoring, therapeutic 
conversation, active listening, ensured contract for safety, provided distraction, 
redirection, encouragement, positive reinforcement, and Q15 minute safety checks.



Response: Patient is pleasant and cooperative with care; compliant with medication. PRN 
Tylenol provided for his L knee. Nicotine patch removed. He denies SI, HI, A/VH; no apparent 
delusions expressed. He is social with staff, paced the unit and participated in HS snack 
prior to bed; observed sleeping and does not appear to be having difficulty. 



Plan: Crisis interruption and stabilization in a safe and therapeutic environment. Pt is on 
a TCON. Pt is awaiting for his county to arrange a court date.

## 2022-11-06 VITALS — SYSTOLIC BLOOD PRESSURE: 98 MMHG | DIASTOLIC BLOOD PRESSURE: 59 MMHG

## 2022-11-06 VITALS — DIASTOLIC BLOOD PRESSURE: 64 MMHG | SYSTOLIC BLOOD PRESSURE: 96 MMHG

## 2022-11-06 RX ADMIN — BENZTROPINE MESYLATE SCH MG: 1 TABLET ORAL at 07:31

## 2022-11-06 RX ADMIN — IBUPROFEN SCH MG: 400 TABLET, FILM COATED ORAL at 12:36

## 2022-11-06 RX ADMIN — DIVALPROEX SODIUM SCH MG: 250 TABLET, DELAYED RELEASE ORAL at 07:32

## 2022-11-06 RX ADMIN — NICOTINE SCH PATCH: 21 PATCH, EXTENDED RELEASE TRANSDERMAL at 07:33

## 2022-11-06 RX ADMIN — IBUPROFEN SCH MG: 400 TABLET, FILM COATED ORAL at 07:31

## 2022-11-06 RX ADMIN — IBUPROFEN SCH MG: 400 TABLET, FILM COATED ORAL at 17:37

## 2022-11-06 RX ADMIN — BENZTROPINE MESYLATE SCH MG: 1 TABLET ORAL at 20:35

## 2022-11-06 RX ADMIN — NICOTINE POLACRILEX PRN MG: 2 LOZENGE ORAL at 15:48

## 2022-11-06 RX ADMIN — DIVALPROEX SODIUM SCH MG: 250 TABLET, DELAYED RELEASE ORAL at 20:35

## 2022-11-06 RX ADMIN — DOCUSATE SODIUM SCH MG: 100 CAPSULE, LIQUID FILLED ORAL at 07:32

## 2022-11-06 NOTE — NUR
Nursing Progress Note:      



Problem: Per 5150 pt. is here for SA by OD on street drugs after command hallucinations 
telling him to do so. Pt. states that he took "Fentanyl, black china, meth, 8 ball" in 
suicide attempt. Pt. states he feels hopeless. Pt. is also delusional, stating, "they won't 
give me my billion dollars". Pt. states, "I tried killing myself because Im a free renetta. 
They dont love me but I love them. Yes I do."



Interventions: 1:1 assessment, medication administration/education/monitoring, therapeutic 
conversation, active listening, ensured contract for safety, provided distraction, 
redirection, encouragement, positive reinforcement, and Q15 minute safety checks.



Response:  Patient awake shortly after change of shift.  Patient requests his medications 
early, which is his usual routine.  Patient has increasing stress/anxiety in the morning and 
patient states that if he is flipped off again, he will probably act on it.  Instructed 
patient to get RN before he does anything and patient agrees.  Patient checks with staff 
before entering dining room, to see if it is safe.  Patient is told where to sit in dining 
room to ensure safety during meals.  Patient self-isolates in his room for most of the day, 
napping on and off.  At approximately 15:45, patient requests something for anxiety and was 
given his prn Klonopin, and again appears anxious and impulsive.  There were no behavioral 
difficulties this shift.  



Plan: Crisis interruption and stabilization in a safe and therapeutic environment. Pt is on 
a TCON. Pt is awaiting for his county to arrange a court date.
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
## 2022-11-07 VITALS — DIASTOLIC BLOOD PRESSURE: 60 MMHG | SYSTOLIC BLOOD PRESSURE: 110 MMHG

## 2022-11-07 VITALS — DIASTOLIC BLOOD PRESSURE: 81 MMHG | SYSTOLIC BLOOD PRESSURE: 161 MMHG

## 2022-11-07 VITALS — SYSTOLIC BLOOD PRESSURE: 120 MMHG | DIASTOLIC BLOOD PRESSURE: 80 MMHG

## 2022-11-07 RX ADMIN — IBUPROFEN SCH MG: 400 TABLET, FILM COATED ORAL at 11:48

## 2022-11-07 RX ADMIN — BENZTROPINE MESYLATE SCH MG: 1 TABLET ORAL at 20:39

## 2022-11-07 RX ADMIN — DIVALPROEX SODIUM SCH MG: 250 TABLET, DELAYED RELEASE ORAL at 20:39

## 2022-11-07 RX ADMIN — BENZTROPINE MESYLATE SCH MG: 1 TABLET ORAL at 07:33

## 2022-11-07 RX ADMIN — DIVALPROEX SODIUM SCH MG: 250 TABLET, DELAYED RELEASE ORAL at 07:34

## 2022-11-07 RX ADMIN — IBUPROFEN SCH MG: 400 TABLET, FILM COATED ORAL at 07:34

## 2022-11-07 RX ADMIN — IBUPROFEN SCH MG: 400 TABLET, FILM COATED ORAL at 17:00

## 2022-11-07 RX ADMIN — NICOTINE SCH PATCH: 21 PATCH, EXTENDED RELEASE TRANSDERMAL at 07:35

## 2022-11-07 RX ADMIN — NICOTINE POLACRILEX PRN MG: 2 LOZENGE ORAL at 08:43

## 2022-11-07 RX ADMIN — DOCUSATE SODIUM SCH MG: 100 CAPSULE, LIQUID FILLED ORAL at 07:34
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
## 2022-11-08 VITALS — DIASTOLIC BLOOD PRESSURE: 74 MMHG | SYSTOLIC BLOOD PRESSURE: 123 MMHG

## 2022-11-08 VITALS — DIASTOLIC BLOOD PRESSURE: 71 MMHG | SYSTOLIC BLOOD PRESSURE: 120 MMHG

## 2022-11-08 RX ADMIN — BENZTROPINE MESYLATE SCH MG: 1 TABLET ORAL at 21:17

## 2022-11-08 RX ADMIN — DIVALPROEX SODIUM SCH MG: 250 TABLET, DELAYED RELEASE ORAL at 07:14

## 2022-11-08 RX ADMIN — NICOTINE POLACRILEX PRN MG: 2 LOZENGE ORAL at 07:41

## 2022-11-08 RX ADMIN — NICOTINE SCH PATCH: 21 PATCH, EXTENDED RELEASE TRANSDERMAL at 07:15

## 2022-11-08 RX ADMIN — DOCUSATE SODIUM SCH MG: 100 CAPSULE, LIQUID FILLED ORAL at 07:14

## 2022-11-08 RX ADMIN — IBUPROFEN SCH MG: 400 TABLET, FILM COATED ORAL at 11:49

## 2022-11-08 RX ADMIN — DIVALPROEX SODIUM SCH MG: 250 TABLET, DELAYED RELEASE ORAL at 21:17

## 2022-11-08 RX ADMIN — IBUPROFEN SCH MG: 400 TABLET, FILM COATED ORAL at 16:49

## 2022-11-08 RX ADMIN — IBUPROFEN SCH MG: 400 TABLET, FILM COATED ORAL at 07:59

## 2022-11-08 RX ADMIN — BENZTROPINE MESYLATE SCH MG: 1 TABLET ORAL at 07:14
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
## 2022-11-09 VITALS — SYSTOLIC BLOOD PRESSURE: 122 MMHG | DIASTOLIC BLOOD PRESSURE: 67 MMHG

## 2022-11-09 VITALS — SYSTOLIC BLOOD PRESSURE: 111 MMHG | DIASTOLIC BLOOD PRESSURE: 79 MMHG

## 2022-11-09 RX ADMIN — IBUPROFEN SCH MG: 400 TABLET, FILM COATED ORAL at 07:43

## 2022-11-09 RX ADMIN — DIVALPROEX SODIUM SCH MG: 250 TABLET, DELAYED RELEASE ORAL at 20:34

## 2022-11-09 RX ADMIN — NICOTINE POLACRILEX PRN MG: 2 LOZENGE ORAL at 11:49

## 2022-11-09 RX ADMIN — IBUPROFEN SCH MG: 400 TABLET, FILM COATED ORAL at 12:55

## 2022-11-09 RX ADMIN — DIVALPROEX SODIUM SCH MG: 250 TABLET, DELAYED RELEASE ORAL at 07:43

## 2022-11-09 RX ADMIN — DOCUSATE SODIUM SCH MG: 100 CAPSULE, LIQUID FILLED ORAL at 07:43

## 2022-11-09 RX ADMIN — IBUPROFEN SCH MG: 400 TABLET, FILM COATED ORAL at 17:32

## 2022-11-09 RX ADMIN — BENZTROPINE MESYLATE SCH MG: 1 TABLET ORAL at 20:34

## 2022-11-09 RX ADMIN — NICOTINE SCH PATCH: 21 PATCH, EXTENDED RELEASE TRANSDERMAL at 07:44

## 2022-11-09 RX ADMIN — NICOTINE POLACRILEX PRN MG: 2 LOZENGE ORAL at 16:15

## 2022-11-09 RX ADMIN — BENZTROPINE MESYLATE SCH MG: 1 TABLET ORAL at 07:43
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
## 2022-11-10 VITALS — DIASTOLIC BLOOD PRESSURE: 74 MMHG | SYSTOLIC BLOOD PRESSURE: 124 MMHG

## 2022-11-10 VITALS — DIASTOLIC BLOOD PRESSURE: 73 MMHG | SYSTOLIC BLOOD PRESSURE: 136 MMHG

## 2022-11-10 RX ADMIN — DIVALPROEX SODIUM SCH MG: 250 TABLET, DELAYED RELEASE ORAL at 07:44

## 2022-11-10 RX ADMIN — DOCUSATE SODIUM SCH MG: 100 CAPSULE, LIQUID FILLED ORAL at 07:44

## 2022-11-10 RX ADMIN — BENZTROPINE MESYLATE SCH MG: 1 TABLET ORAL at 07:44

## 2022-11-10 RX ADMIN — IBUPROFEN SCH MG: 400 TABLET, FILM COATED ORAL at 07:45

## 2022-11-10 RX ADMIN — BENZTROPINE MESYLATE SCH MG: 1 TABLET ORAL at 20:39

## 2022-11-10 RX ADMIN — IBUPROFEN SCH MG: 400 TABLET, FILM COATED ORAL at 12:16

## 2022-11-10 RX ADMIN — DIVALPROEX SODIUM SCH MG: 250 TABLET, DELAYED RELEASE ORAL at 20:41

## 2022-11-10 RX ADMIN — NICOTINE SCH PATCH: 21 PATCH, EXTENDED RELEASE TRANSDERMAL at 07:44

## 2022-11-10 RX ADMIN — NICOTINE POLACRILEX PRN MG: 2 LOZENGE ORAL at 06:41

## 2022-11-10 RX ADMIN — IBUPROFEN SCH MG: 400 TABLET, FILM COATED ORAL at 17:30
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
## 2022-11-10 NOTE — NUR
Received order for consult. Patient asked Hospitalist for Suboxone. Spoke with patients 
 and she plans on talking to Dr Perla to see if this would be beneficial for 
patient.

## 2022-11-10 NOTE — NUR
Reassessment: Pt continues eating well on Vegetarian diet, documented with mostly 100% PO 
intake while receiving double protein TID. LBM 11/8, receiving routine bowel care with PRN 
bowel care available. No nutrition intervention implemented at this time. Will continue to 
follow.

Recommendations:

1) Continue Vegetarian diet per pt request

2) Double protein TID

3) Bowel care PRN

4) Weekly scaled weights

-------------------------------------------------------------------------------

Addendum: 11/10/22 at 0703 by Loc Villalba RD

-------------------------------------------------------------------------------

Amended: Links added.

## 2022-11-10 NOTE — NUR
Nursing Progress Note: Mike



Problem: 

Per 5150 pt. is here for SA by OD on street drugs after command hallucinations telling him 
to do so. Pt. states that he took "Fentanyl, black china, meth, 8 ball" in suicide attempt.

Interventions: 

Maintained a safe and supportive environment, ensured contract for safety, provided clear 
and simple instructions, attempted to orient to reality, monitored behavior and provided 
intervention as needed, provided active listening and positive encouragement, encouraged 
independent performance of ADLs, and maintained Q 15min safety checks. 



Response: 

Received pt. lying in bed resting.  He states he is doing OK and that he is somewhat 
depressed and anxious.  Pt isolated all evening and took HS medications and went back to 
sleep.  No other needs or complaints.  He denies SI, HI, A/VH. 



Plan:

Per provider, pt. continues to require a safe and supportive environment. Conservatorship is 
recommended.

## 2022-11-11 VITALS — DIASTOLIC BLOOD PRESSURE: 85 MMHG | SYSTOLIC BLOOD PRESSURE: 138 MMHG

## 2022-11-11 VITALS — SYSTOLIC BLOOD PRESSURE: 115 MMHG | DIASTOLIC BLOOD PRESSURE: 75 MMHG

## 2022-11-11 RX ADMIN — DIVALPROEX SODIUM SCH MG: 250 TABLET, DELAYED RELEASE ORAL at 07:52

## 2022-11-11 RX ADMIN — IBUPROFEN SCH MG: 400 TABLET, FILM COATED ORAL at 07:52

## 2022-11-11 RX ADMIN — NICOTINE POLACRILEX PRN MG: 2 LOZENGE ORAL at 11:28

## 2022-11-11 RX ADMIN — NICOTINE SCH PATCH: 21 PATCH, EXTENDED RELEASE TRANSDERMAL at 07:51

## 2022-11-11 RX ADMIN — IBUPROFEN SCH MG: 400 TABLET, FILM COATED ORAL at 18:02

## 2022-11-11 RX ADMIN — BENZTROPINE MESYLATE SCH MG: 1 TABLET ORAL at 20:25

## 2022-11-11 RX ADMIN — BENZTROPINE MESYLATE SCH MG: 1 TABLET ORAL at 07:52

## 2022-11-11 RX ADMIN — DIVALPROEX SODIUM SCH MG: 250 TABLET, DELAYED RELEASE ORAL at 20:26

## 2022-11-11 RX ADMIN — DOCUSATE SODIUM SCH MG: 100 CAPSULE, LIQUID FILLED ORAL at 07:51

## 2022-11-11 RX ADMIN — IBUPROFEN SCH MG: 400 TABLET, FILM COATED ORAL at 12:27

## 2022-11-11 NOTE — NUR
Nursing Progress Note:



Problem : Per 5150 Pt. reports recurring suicidal thoughts and was planning to commit 
suicide with a hand written note. Pt. makes multiple somatic complaints. Pt. reports of 
having a tape worm and recently losing 70lbs. Pt. reports he has a bug in his right ear, 
that this bone structure is changing, and that he has HIV. Pt. reports SI but states, "Not 
here though". Pt. reports he did have a plan to use a knife to cut himself. Pt. report 
previous suicide attempt attempting to cut himself 2 years ago. Pt. reports A/VH, reporting 
that he hears people talking in his head and images being projected and bright flashes. Pt. 
reports he does not have any goals or dreams because 6 years ago he quit working due to 
memory issues. Pt. stopped taking his medications in May 2022 because he felt they were not 
working.



Interventions : Introduced self and established rapport, maintained a safe and supportive 
environment, ensured contract for safety, provided clear and simple instructions, provided 
active listening and positive encouragement, encouraged participation on the unit, provided 
medication education and encouraged compliance, and maintained Q 15min safety checks.  



Response :  Patient seen in bed at [facility]. He wakes up for breakfast, but states Im not 
hungry. Hes compliant with medications and still believes he has tapeworm. A stool sample 
was sent to the lab and there were no white blood cells or tapeworm observed. Patient 
continues to be depressed, feeling worthless and useless. He believes the only thing that 
will fix it is for him to die. He continues to refuse food, and he drinks very little.

Plan : Pt. is not eating nor drinking and needs crisis intervention and medication 
titration. Pt. wants to go to the [facility] and working with SS for that.

## 2022-11-11 NOTE — NUR
Sent updated notes to [facility] for placement purposes.



SIDDHARTHA Salamanca

## 2022-11-11 NOTE — NUR
Nursing Progress Note: 



Problem:  Per 5150 pt. is here for SA by OD on street drugs after command hallucinations 
telling him to do so. Pt. states that he took "Fentanyl, black china, meth, 8 ball" in 
suicide attempt.

Interventions:  Maintained a safe and supportive environment, ensured contract for safety, 
provided clear and simple instructions, attempted to orient to reality, monitored behavior 
and provided intervention as needed, provided active listening and positive encouragement, 
encouraged independent performance of ADLs, and maintained Q 15min safety checks. 



Response:  Patient was observed sleeping at change of shift. Patient continued to sleep 
until nurse brought in night medications. Patient was encouraged to participate in snack but 
refused. Patient retuned to sleep until 0500 when patient woke up asking about a new 
medications that was supposed to be added. Patient was not happy when he found out it was 
not there. 



Plan: Per provider, pt. continues to require a safe and supportive environment. 
Conservatorship is recommended.

## 2022-11-12 VITALS — SYSTOLIC BLOOD PRESSURE: 108 MMHG | DIASTOLIC BLOOD PRESSURE: 66 MMHG

## 2022-11-12 VITALS — DIASTOLIC BLOOD PRESSURE: 69 MMHG | SYSTOLIC BLOOD PRESSURE: 107 MMHG

## 2022-11-12 RX ADMIN — DIVALPROEX SODIUM SCH MG: 250 TABLET, DELAYED RELEASE ORAL at 07:00

## 2022-11-12 RX ADMIN — DOCUSATE SODIUM SCH MG: 100 CAPSULE, LIQUID FILLED ORAL at 07:01

## 2022-11-12 RX ADMIN — BUPRENORPHINE AND NALOXONE SCH TAB: 2; .5 TABLET SUBLINGUAL at 08:11

## 2022-11-12 RX ADMIN — IBUPROFEN SCH MG: 400 TABLET, FILM COATED ORAL at 07:00

## 2022-11-12 RX ADMIN — IBUPROFEN SCH MG: 400 TABLET, FILM COATED ORAL at 11:32

## 2022-11-12 RX ADMIN — IBUPROFEN SCH MG: 400 TABLET, FILM COATED ORAL at 17:02

## 2022-11-12 RX ADMIN — NICOTINE SCH PATCH: 21 PATCH, EXTENDED RELEASE TRANSDERMAL at 07:02

## 2022-11-12 RX ADMIN — DIVALPROEX SODIUM SCH MG: 250 TABLET, DELAYED RELEASE ORAL at 20:01

## 2022-11-12 RX ADMIN — BENZTROPINE MESYLATE SCH MG: 1 TABLET ORAL at 07:01

## 2022-11-12 RX ADMIN — BENZTROPINE MESYLATE SCH MG: 1 TABLET ORAL at 20:02

## 2022-11-12 NOTE — NUR
Nursing Progress Note:



Problem: Pt on a 5150 for DTS. Pt reports the devil is talking to him telling him to commit 
suicide and he plans to overdose on Fentanyl. 



Interventions: Provided 1:1 assessment with therapeutic communication and active listening; 
Provided medication administration/education/monitoring; Provided PRN's as requested; 
Encouraged participation of outside group activity; Observed pt tearful; Monitored and 
Maintained Q 15min safety checks.  



Response: Pt calm, cooperative and polite during morning assessment; Pt is medication 
compliant; requesting either PRN's or routine medications most of the day; Pt declined 
outside group obsessing on "exercise and increasing endorphins." He continues to appear 
depressed with a flat affect. He reports, "I miss my son" he was observed crying. 



Plan : Pt. is not eating nor drinking and needs crisis intervention and medication 
titration. Pt. wants to go to the [facility] and working with SS for that.

## 2022-11-12 NOTE — NUR
Nursing Progress Note: 



Problem:  Per 5150 pt. is here for SA by OD on street drugs after command hallucinations 
telling him to do so. Pt. states that he took "Fentanyl, black china, meth, 8 ball" in 
suicide attempt.

Interventions:  Maintained a safe and supportive environment, ensured contract for safety, 
provided clear and simple instructions, attempted to orient to reality, monitored behavior 
and provided intervention as needed, provided active listening and positive encouragement, 
encouraged independent performance of ADLs, and maintained Q 15min safety checks. 



Response:  Patient was received pacing hallways at beginning of shift. Patient expressed 
concern in gaining weight and hoped if he paced the halls for two hour he could burn off his 
dinner. Patient was later observed listening to music and sitting on end of bed Patient 
refused to eat snack but kept asking for juice. Patient took all night medications without 
issue and retuned to pacing halls until eventually going to bed. Patient got up two separate 
times in the night asking for more juice. 



Plan: Per provider, pt. continues to require a safe and supportive environment. 
Conservatorship is recommended.

## 2022-11-13 VITALS — SYSTOLIC BLOOD PRESSURE: 135 MMHG | DIASTOLIC BLOOD PRESSURE: 89 MMHG

## 2022-11-13 VITALS — SYSTOLIC BLOOD PRESSURE: 108 MMHG | DIASTOLIC BLOOD PRESSURE: 67 MMHG

## 2022-11-13 RX ADMIN — DIVALPROEX SODIUM SCH MG: 250 TABLET, DELAYED RELEASE ORAL at 07:06

## 2022-11-13 RX ADMIN — IBUPROFEN SCH MG: 400 TABLET, FILM COATED ORAL at 12:20

## 2022-11-13 RX ADMIN — BENZTROPINE MESYLATE SCH MG: 1 TABLET ORAL at 19:42

## 2022-11-13 RX ADMIN — NICOTINE SCH PATCH: 21 PATCH, EXTENDED RELEASE TRANSDERMAL at 07:06

## 2022-11-13 RX ADMIN — BENZTROPINE MESYLATE SCH MG: 1 TABLET ORAL at 07:07

## 2022-11-13 RX ADMIN — IBUPROFEN SCH MG: 400 TABLET, FILM COATED ORAL at 16:55

## 2022-11-13 RX ADMIN — NICOTINE POLACRILEX PRN MG: 2 LOZENGE ORAL at 18:42

## 2022-11-13 RX ADMIN — DOCUSATE SODIUM SCH MG: 100 CAPSULE, LIQUID FILLED ORAL at 07:06

## 2022-11-13 RX ADMIN — BUPRENORPHINE AND NALOXONE SCH TAB: 2; .5 TABLET SUBLINGUAL at 07:08

## 2022-11-13 RX ADMIN — IBUPROFEN SCH MG: 400 TABLET, FILM COATED ORAL at 07:08

## 2022-11-13 RX ADMIN — DIVALPROEX SODIUM SCH MG: 250 TABLET, DELAYED RELEASE ORAL at 19:41

## 2022-11-13 NOTE — NUR
Nursing Progress Note:



Problem: Pt on a 5150 for DTS. Pt reports the devil is talking to him telling him to commit 
suicide and he plans to overdose on Fentanyl. 



Interventions: Provided 1:1 assessment with therapeutic communication and active listening; 
Provided medication administration/education/monitoring; Provided PRN's as requested; 
Encouraged participation of outside group activity, Monitored and Maintained Q 15min safety 
checks.  



Response:  Received patient awake shortly after change of shift.  Patient requests 
medications early, and ate with peers in dining room. Patient appears a little more social 
today with peers and is pleasant and cooperative this shift.  Patient reports being happy, 
but tired.  



Patient informs RN that his mom has cancer, and appears a sad when telling story.  He wants 
to know if there are any organizations that donate money to cancer patients.  Patient tells 
RN that I remind him of his mother, and smiles.  



Patient has been pacing on the unit most of the afternoon. Patient complains of right great 
toe pain, where his toenail came off, routine Motrin with prn Tylenol administered with good 
result.



Plan :   Patient needs structured environment with medication adjustments in a safe 
environment.

## 2022-11-13 NOTE — NUR
Nursing Progress Note:



Problem: Pt on a 5150 for DTS. Pt reports the devil is talking to him telling him to commit 
suicide and he plans to overdose on Fentanyl. 



Interventions: Provided 1:1 assessment with therapeutic communication and active listening; 
Provided medication administration/education/monitoring; Provided PRN's as requested; 
Encouraged participation of outside group activity; Observed pt tearful; Monitored and 
Maintained Q 15min safety checks.  



Response: Patient was observed pacing unit again stating he needed to burn calories. Patient 
was later found sleeping in bed and had to be awoken in order to give night medications. 
Patient again refused night snack stating he wont be taking in no extra calories. Patient 
retuned to bed after medications. Patient slept until 2300 when he got up confused about the 
time. Patient had to be told multiple times what time it was. Patient eventually went back 
to bed. 



Plan : Pt. is not eating nor drinking and needs crisis intervention and medication 
titration. Pt. wants to go to the [facility] and working with SS for that.

## 2022-11-14 VITALS — SYSTOLIC BLOOD PRESSURE: 103 MMHG | DIASTOLIC BLOOD PRESSURE: 62 MMHG

## 2022-11-14 VITALS — SYSTOLIC BLOOD PRESSURE: 112 MMHG | DIASTOLIC BLOOD PRESSURE: 66 MMHG

## 2022-11-14 RX ADMIN — DOCUSATE SODIUM SCH MG: 100 CAPSULE, LIQUID FILLED ORAL at 07:36

## 2022-11-14 RX ADMIN — NICOTINE SCH PATCH: 21 PATCH, EXTENDED RELEASE TRANSDERMAL at 07:37

## 2022-11-14 RX ADMIN — DIVALPROEX SODIUM SCH MG: 250 TABLET, DELAYED RELEASE ORAL at 07:36

## 2022-11-14 RX ADMIN — DIVALPROEX SODIUM SCH MG: 250 TABLET, DELAYED RELEASE ORAL at 20:06

## 2022-11-14 RX ADMIN — IBUPROFEN SCH MG: 400 TABLET, FILM COATED ORAL at 07:36

## 2022-11-14 RX ADMIN — BENZTROPINE MESYLATE SCH MG: 1 TABLET ORAL at 07:36

## 2022-11-14 RX ADMIN — BENZTROPINE MESYLATE SCH MG: 1 TABLET ORAL at 20:06

## 2022-11-14 RX ADMIN — IBUPROFEN SCH MG: 400 TABLET, FILM COATED ORAL at 17:32

## 2022-11-14 RX ADMIN — BUPRENORPHINE AND NALOXONE SCH TAB: 2; .5 TABLET SUBLINGUAL at 07:36

## 2022-11-14 RX ADMIN — IBUPROFEN SCH MG: 400 TABLET, FILM COATED ORAL at 12:33

## 2022-11-14 NOTE — NUR
NURSING PROGRESS NOTE



Problem: Pt on a 5150 for DTS. Pt reports the devil is talking to him telling him to commit 
suicide and he plans to overdose on Fentanyl. 



Interventions: Maintained a safe and supportive environment, administered medication per 
orders with no adverse side effects, provided clear and simple instructions, monitored pain 
and need for intervention, provided redirection as needed, and maintained Q 15min safety 
checks



Response:  Received patient sleeping at shift change. Pt woke and requested his medication 
and took without issue.  Pt reports sleeping well. Pt reported right leg pain radiating into 
his foot. Pt receives scheduled Motrin and PRN Tylenol, with good results.  Pt was pretty 
quiet today, paced unit most of the morning. He took a long nap after lunch. Pt reports 
feeling sad. I miss my mom. Pt had no behavioral outbursts today. He engages with staff 
appropriately. His affect is restricted but brightens at times. 



Plan :   Providers report patient is at baseline and awaiting placement. SW are working with 
[facility] to arrange placement.

## 2022-11-14 NOTE — NUR
Nursing Progress Note:



Problem: Pt on a 5150 for DTS. Pt reports the devil is talking to him telling him to commit 
suicide and he plans to overdose on Fentanyl. 



Interventions: Provided 1:1 assessment with therapeutic communication and active listening; 
Provided medication administration/education/monitoring; Provided PRN's as requested; 
Encouraged participation of outside group activity; Observed pt tearful; Monitored and 
Maintained Q 15min safety checks.  



Response: Patient in hallway at shift change. Patient asked for Tylenol for ankle pain and a 
nicotine lozenge. Patient reports that he is on a vegan diet and needs to walk in the 
hallway to burn calories. The patient states that he had a good day and felt very awake. The 
patient began to walk in the hallway and perseverate on not eating meat and losing weight. 
The patient had some cheese and then took evening meds w/o complications. The patient went 
to bed shortly after.



Plan : Pt. is not eating nor drinking and needs crisis intervention and medication 
titration. Pt. wants to go to the [facility] and working with SS for that.

## 2022-11-15 VITALS — SYSTOLIC BLOOD PRESSURE: 138 MMHG | DIASTOLIC BLOOD PRESSURE: 77 MMHG

## 2022-11-15 VITALS — DIASTOLIC BLOOD PRESSURE: 75 MMHG | SYSTOLIC BLOOD PRESSURE: 114 MMHG

## 2022-11-15 RX ADMIN — IBUPROFEN SCH MG: 400 TABLET, FILM COATED ORAL at 12:37

## 2022-11-15 RX ADMIN — IBUPROFEN SCH MG: 400 TABLET, FILM COATED ORAL at 17:53

## 2022-11-15 RX ADMIN — DIVALPROEX SODIUM SCH MG: 250 TABLET, DELAYED RELEASE ORAL at 08:16

## 2022-11-15 RX ADMIN — NICOTINE SCH PATCH: 21 PATCH, EXTENDED RELEASE TRANSDERMAL at 08:17

## 2022-11-15 RX ADMIN — BUPRENORPHINE AND NALOXONE SCH TAB: 2; .5 TABLET SUBLINGUAL at 08:17

## 2022-11-15 RX ADMIN — IBUPROFEN SCH MG: 400 TABLET, FILM COATED ORAL at 08:22

## 2022-11-15 RX ADMIN — DOCUSATE SODIUM SCH MG: 100 CAPSULE, LIQUID FILLED ORAL at 08:16

## 2022-11-15 RX ADMIN — DIVALPROEX SODIUM SCH MG: 250 TABLET, DELAYED RELEASE ORAL at 20:20

## 2022-11-15 RX ADMIN — BENZTROPINE MESYLATE SCH MG: 1 TABLET ORAL at 20:21

## 2022-11-15 RX ADMIN — NICOTINE POLACRILEX PRN MG: 2 LOZENGE ORAL at 12:04

## 2022-11-15 RX ADMIN — BENZTROPINE MESYLATE SCH MG: 1 TABLET ORAL at 08:16

## 2022-11-15 NOTE — NUR
Nursing Progress Note:



Problem: Pt on a 5150 for DTS. Pt reports the devil is talking to him telling him to commit 
suicide and he plans to overdose on Fentanyl. 



Interventions: Provided 1:1 assessment with therapeutic communication and active listening; 
Provided medication administration/education/monitoring; Provided PRN's as requested; 
Encouraged participation of outside group activity; Observed pt tearful; Monitored and 
Maintained Q 15min safety checks.  



Response: The patient self isolated to his room most of the evening. The patient made 
minimal responses to questions stating, "I'm tired." The patient was given a fruit cocktail 
at snack time and took all evening meds w/o complications. The patient went to bed a short 
time later.



Plan : Pt. is not eating nor drinking and needs crisis intervention and medication 
titration. Pt. wants to go to the [facility] and working with SS for that.

## 2022-11-15 NOTE — NUR
NURSING PROGRESS NOTE



Problem: Pt on a 5150 for DTS. Pt reports the devil is talking to him telling him to commit 
suicide and he plans to overdose on Fentanyl. 



Interventions: Maintained a safe and supportive environment, administered medication per 
orders, provided clear and simple instructions, monitored pain and need for intervention, 
provided redirection as needed, and maintained Q 15min safety checks



Response:  Patient sleeping at shift change.  Awoke for breakfast and medications were 
administered at bedside.  Patient then slept until 12:00 and sat in group room until lunch.  
After lunch, patient went back to bed.  Patient is complaining of feeling light headed and 
that he is tired. Reported this to Dr. Davalos, who made some medication adjustments and 
ordered labs, ammonia and Depakote levels for tomorrow.  Patient has not been pacing 
hallways and appears somewhat depressed or just not his usual self.  Patient reportedly 
slept 9.25 hours last night, but has slept most of the day.  Patient is cooperative with 
treatment.  Patient does complain of right leg pain and is treated with routine Motrin and 
prn Tylenol.  Patients friend was readmitted to the unit today, and patient was happy and 
smiling to see him and walked slowly in hallways with him, they are to be roommates.



Plan :   Providers report patient is at baseline and awaiting placement. SW are working with 
[facility] to arrange placement.

## 2022-11-16 VITALS — DIASTOLIC BLOOD PRESSURE: 76 MMHG | SYSTOLIC BLOOD PRESSURE: 126 MMHG

## 2022-11-16 VITALS — SYSTOLIC BLOOD PRESSURE: 116 MMHG | DIASTOLIC BLOOD PRESSURE: 70 MMHG

## 2022-11-16 LAB
ALBUMIN SERPL BCP-MCNC: 2.9 G/DL (ref 3.4–5)
ALBUMIN/GLOB SERPL: 0.9 {RATIO} (ref 1.1–1.5)
ALP SERPL-CCNC: 68 IU/L (ref 46–116)
ALT SERPL W P-5'-P-CCNC: 22 U/L (ref 12–78)
ANION GAP SERPL CALCULATED.3IONS-SCNC: 7 MMOL/L (ref 8–16)
AST SERPL W P-5'-P-CCNC: 22 U/L (ref 10–37)
BILIRUB SERPL-MCNC: 0.4 MG/DL (ref 0.1–1)
BUN SERPL-MCNC: 15 MG/DL (ref 7–18)
BUN/CREAT SERPL: 17.9 (ref 5.4–32)
CALCIUM SERPL-MCNC: 8.6 MG/DL (ref 8.5–10.1)
CHLORIDE SERPL-SCNC: 103 MMOL/L (ref 99–107)
CO2 SERPL-SCNC: 31.8 MMOL/L (ref 24–32)
CREAT SERPL-MCNC: 0.84 MG/DL (ref 0.6–1.1)
GFR SERPL CREATININE-BSD FRML MDRD: > 90 ML/MIN
GLUCOSE SERPL-MCNC: 86 MG/DL (ref 70–104)
POTASSIUM SERPL-SCNC: 4.4 MMOL/L (ref 3.5–5.1)
PROT SERPL-MCNC: 6.1 G/DL (ref 6.4–8.2)
SODIUM SERPL-SCNC: 142 MMOL/L (ref 135–145)
VALPROATE SERPL-MCNC: 68 UG/ML (ref 50–100)

## 2022-11-16 RX ADMIN — DOCUSATE SODIUM SCH MG: 100 CAPSULE, LIQUID FILLED ORAL at 07:47

## 2022-11-16 RX ADMIN — BENZTROPINE MESYLATE SCH MG: 1 TABLET ORAL at 20:01

## 2022-11-16 RX ADMIN — NICOTINE SCH PATCH: 21 PATCH, EXTENDED RELEASE TRANSDERMAL at 07:48

## 2022-11-16 RX ADMIN — BENZTROPINE MESYLATE SCH MG: 1 TABLET ORAL at 07:47

## 2022-11-16 RX ADMIN — BUPRENORPHINE AND NALOXONE SCH TAB: 2; .5 TABLET SUBLINGUAL at 07:47

## 2022-11-16 RX ADMIN — DIVALPROEX SODIUM SCH MG: 250 TABLET, DELAYED RELEASE ORAL at 20:01

## 2022-11-16 RX ADMIN — IBUPROFEN SCH MG: 400 TABLET, FILM COATED ORAL at 12:44

## 2022-11-16 RX ADMIN — DIVALPROEX SODIUM SCH MG: 250 TABLET, DELAYED RELEASE ORAL at 07:47

## 2022-11-16 RX ADMIN — IBUPROFEN SCH MG: 400 TABLET, FILM COATED ORAL at 18:19

## 2022-11-16 RX ADMIN — NICOTINE POLACRILEX PRN MG: 2 LOZENGE ORAL at 15:46

## 2022-11-16 RX ADMIN — IBUPROFEN SCH MG: 400 TABLET, FILM COATED ORAL at 07:47

## 2022-11-16 NOTE — NUR
NURSING PROGRESS NOTE



Problem: Pt on a 5150 for DTS. Pt reports the devil is talking to him telling him to commit 
suicide and he plans to overdose on Fentanyl. 



Interventions: Maintained a safe and supportive environment, administered medication per 
orders, provided clear and simple instructions, monitored pain and need for intervention, 
provided redirection as needed, and maintained Q 15min safety checks



Response:  Received patient sleeping in bed at shift change.  Patient awakens and takes his 
medications, then goes into the dining room for breakfast.  Patient sits with his roommate 
and talks during breakfast, then returns to bed.  Medications have been reduced by Dr. Davalos and labs appear WNL.  Patient states that he had weird dreams last night, and is 
tired this shift and sleeps much of the day. Patient takes routine Motrin and prn Tylenol  
for his right leg pain and left ankle pain. Patient has been cooperative and calm. No 
behavioral outbursts noted in the last two weeks.  



Plan :   Providers report patient is at baseline and awaiting placement. SW are working with 
[facility] to arrange placement.

## 2022-11-16 NOTE — NUR
NURSING PROGRESS NOTE



Problem: Pt on a 5150 for DTS. Pt reports the devil is talking to him telling him to commit 
suicide and he plans to overdose on Fentanyl. 



Interventions: Maintained a safe and supportive environment, administered medication per 
orders, provided clear and simple instructions, monitored pain and need for intervention, 
provided redirection as needed, and maintained Q 15min safety checks



Response:  Pt was sleeping in bed at change of shift. Pt reports his day was "not good" but 
doesnt elaborate with further assessment. Pt appears depressed and declines to have snacks 
with peers. Pt is med compliant with HS meds and returns to sleep after med pass. 



Plan :   Providers report patient is at baseline and awaiting placement. SW are working with 
[facility] to arrange placement.

## 2022-11-16 NOTE — NUR
NURSING PROGRESS NOTE



Problem: Pt on a 5150 for DTS. Pt reports the devil is talking to him telling him to commit 
suicide and he plans to overdose on Fentanyl. 



Interventions: Maintained a safe and supportive environment, administered medication per 
orders, provided clear and simple instructions, monitored pain and need for intervention, 
provided redirection as needed, and maintained Q 15min safety checks



Response:  Pt was sleeping in bed at change of shift. Pt states his day was good. Pt denies 
a/vh. Pt c/o feeling tired and "sleeping way too much...did they give me more meds?" Pt had 
a snack and returned to sleep after HS meds. 



Plan :   Providers report patient is at baseline and awaiting placement. SW are working with 
[facility] [employer] [facility] to arrange placement.


-------------------------------------------------------------------------------

Addendum: 11/17/22 at 0138 by Jenna Cisneros RN

-------------------------------------------------------------------------------

Pt woke up stated "Im freaking out, I feel like Im talking to my mom in my head." Pt was 
given prn clonazepam and returned to bed.

## 2022-11-17 VITALS — SYSTOLIC BLOOD PRESSURE: 118 MMHG | DIASTOLIC BLOOD PRESSURE: 72 MMHG

## 2022-11-17 VITALS — SYSTOLIC BLOOD PRESSURE: 115 MMHG | DIASTOLIC BLOOD PRESSURE: 74 MMHG

## 2022-11-17 RX ADMIN — NICOTINE SCH PATCH: 21 PATCH, EXTENDED RELEASE TRANSDERMAL at 08:16

## 2022-11-17 RX ADMIN — DIVALPROEX SODIUM SCH MG: 250 TABLET, DELAYED RELEASE ORAL at 08:00

## 2022-11-17 RX ADMIN — BENZTROPINE MESYLATE SCH MG: 1 TABLET ORAL at 20:37

## 2022-11-17 RX ADMIN — DIVALPROEX SODIUM SCH MG: 250 TABLET, DELAYED RELEASE ORAL at 20:37

## 2022-11-17 RX ADMIN — BENZTROPINE MESYLATE SCH MG: 1 TABLET ORAL at 08:04

## 2022-11-17 RX ADMIN — DOCUSATE SODIUM SCH MG: 100 CAPSULE, LIQUID FILLED ORAL at 08:05

## 2022-11-17 RX ADMIN — IBUPROFEN SCH MG: 400 TABLET, FILM COATED ORAL at 08:04

## 2022-11-17 RX ADMIN — BUPRENORPHINE AND NALOXONE SCH TAB: 2; .5 TABLET SUBLINGUAL at 08:05

## 2022-11-17 NOTE — NUR
Nursing Progress Note      



Problem: Per 5150 pt. is here for SA by OD on street drugs after command hallucinations 
telling him to do so. Pt. states that he took "Fentanyl, black china, meth, 8 ball" in 
suicide attempt. Pt. states he feels hopeless. Pt. is also delusional, stating, "they won't 
give me my billion dollars". Pt. states, "I tried killing myself because Im a free renetta. 
They dont love me but I love them. Yes I do."



Interventions: 1:1 assessment, medication administration/education/monitoring, therapeutic 
conversation, active listening, ensured contract for safety, behavior monitoring and 
intervention as needed; reality orientation, provided distraction, redirection, 
encouragement, positive reinforcement, and Q15 minute safety checks.



Response:  Pt was up for breakfast and cooperative with medication. Per this morning's 
report, pt has had increased somnolence, has been slurring his words more and decreased LOC. 
Noted recent labs with an elevated ammonia level. Held am Depakote. Upon later review of his 
chart discovered that ammonia level had previously been higher and was trending down. 
Notified Dr Davalos that Depakote was held this morning. Dr Davalos instructed to 
continue giving the Depakote at the decreased dose he had ordered. Pt's routine Motrin was 
changed to PRN. 



Pt napped after breakfast. Pt expressed feeling anxious about when he will be placed 
somewhere. Pt showered immediately after breakfast. Pt had notable increased slurring after 
morning medications including Suboxone. After lunch, pt reported to this nurse that he had 
been listening to the radio headphones and he heard someone on the radio say, "If you want 
to kill yourself, I'll help you." Difficult to determine it he really heard this, if it was 
a hallucination or a delusion of reference. Pt contracts for safety. When went to give pt 
his 1500 medications, pt somewhat angrily asked, "why do I need to take this? I'm sitting 
here talking to myself." Pt went on to mumble/slur something like, "I wake up talking to 
myself in a dream and then I'm talking to myself outside of the dream." Pt did take the 
medication. Pt socializes with select peers and staff. Pt paces in the hallway listening to 
the radio headphones.



Plan: Crisis interruption and stabilization in a safe and therapeutic environment. Pt is on 
a TCON. Pt is awaiting for his county to arrange a court date.


-------------------------------------------------------------------------------

Addendum: 11/17/22 at 1543 by Dolly Kebede RN (Lee)

-------------------------------------------------------------------------------

Pt reported that he felt tired today because he dreams about devils and demons every night.

## 2022-11-17 NOTE — NUR
Nursing Progress Note    Mike  



Problem: Per 5150 pt. is here for SA by OD on street drugs after command hallucinations 
telling him to do so. Pt. states that he took "Fentanyl, black china, meth, 8 ball" in 
suicide attempt. Pt. states he feels hopeless. Pt. is also delusional, stating, "they won't 
give me my billion dollars". Pt. states, "I tried killing myself because Im a free renetta. 
They dont love me but I love them. Yes I do."



Interventions: 1:1 assessment, medication administration/education/monitoring, therapeutic 
conversation, active listening, ensured contract for safety, behavior monitoring and 
intervention as needed; reality orientation, provided distraction, redirection, 
encouragement, positive reinforcement, and Q15 minute safety checks.



Response:  Pt sleeping at change of shift.  Woke pt up for HS medications, pt somewhat 
drowsy asking I have to take more medications.  Denies MH symptoms at this time.  Pt 
requested a snack which was provided and went back to sleep.    



Plan: Crisis interruption and stabilization in a safe and therapeutic environment. Pt is on 
a TCON. Pt is awaiting for his county to arrange a court date.

## 2022-11-18 VITALS — SYSTOLIC BLOOD PRESSURE: 96 MMHG | DIASTOLIC BLOOD PRESSURE: 66 MMHG

## 2022-11-18 VITALS — SYSTOLIC BLOOD PRESSURE: 98 MMHG | DIASTOLIC BLOOD PRESSURE: 50 MMHG

## 2022-11-18 RX ADMIN — IBUPROFEN PRN MG: 400 TABLET, FILM COATED ORAL at 14:59

## 2022-11-18 RX ADMIN — NICOTINE POLACRILEX PRN MG: 2 LOZENGE ORAL at 16:55

## 2022-11-18 RX ADMIN — DOCUSATE SODIUM SCH MG: 100 CAPSULE, LIQUID FILLED ORAL at 08:19

## 2022-11-18 RX ADMIN — BENZTROPINE MESYLATE SCH MG: 1 TABLET ORAL at 20:28

## 2022-11-18 RX ADMIN — DIVALPROEX SODIUM SCH MG: 250 TABLET, DELAYED RELEASE ORAL at 08:18

## 2022-11-18 RX ADMIN — BUPRENORPHINE AND NALOXONE SCH TAB: 2; .5 TABLET SUBLINGUAL at 08:18

## 2022-11-18 RX ADMIN — NICOTINE POLACRILEX PRN MG: 2 LOZENGE ORAL at 14:08

## 2022-11-18 RX ADMIN — NICOTINE SCH PATCH: 21 PATCH, EXTENDED RELEASE TRANSDERMAL at 08:59

## 2022-11-18 RX ADMIN — DIVALPROEX SODIUM SCH MG: 250 TABLET, DELAYED RELEASE ORAL at 20:27

## 2022-11-18 RX ADMIN — BENZTROPINE MESYLATE SCH MG: 1 TABLET ORAL at 08:19

## 2022-11-18 NOTE — NUR
PLACEMENT UPDATE



Sent updated notes to [facility] for placement purposes.



SIDDHARTHA Salamanca

## 2022-11-18 NOTE — NUR
Nursing Progress Note      



Problem: Per 5150 pt. is here for SA by OD on street drugs after command hallucinations 
telling him to do so. Pt. states that he took "Fentanyl, black china, meth, 8 ball" in 
suicide attempt. Pt. states he feels hopeless. Pt. is also delusional, stating, "they won't 
give me my billion dollars". Pt. states, "I tried killing myself because Im a free renetta. 
They dont love me but I love them. Yes I do."



Interventions: 1:1 assessment, medication administration/education/monitoring, therapeutic 
conversation, active listening, ensured contract for safety, behavior monitoring and 
intervention as needed; reality orientation, provided distraction, redirection, 
encouragement, positive reinforcement, and Q15 minute safety checks.



Response:  RN received pt. asleep in bed at start of shift. Pt. awoke for breakfast and took 
all medications. 1:1 done at bedside, pt. denies SI/HI, denies auditory hallucinations but 
reports seeing demons. Pt. continues to perseverate on discharge, asking, Do you know how 
much longer I am going to be here. Pt. informed that there is a process to getting 
conserved and placed and he need to be patient. At noon pt. began to become agitated, 
stating, I just cant take it any longer, I need to know when Im going. Pt. given 1300 
dose of Seroquel 100mg with moderate effect. Pt. observed pacing hallway and listening to 
headphones. Pt. is also social with his roommate. Pt. c/o left knee pain and given icepack 
and Tylenol 650mg po with moderate effect. Pt. reported that another pt. spoke to him in a 
rude manner and was having difficulty calming down. Pt. asked for a PRN and received Haldol 
5mg and Klonopin 2mg po with good effect. Pt. c/o left foot pain and received Tylenol 650mg 
po with good effect. Pt. removed nicotine patch and given to this RN to dispose of. Pt. went 
to [facility]. 



Plan: Crisis interruption and stabilization in a safe and therapeutic environment. Pt is on 
a TCON. Pt is awaiting for his county to arrange a court date.

## 2022-11-19 VITALS — DIASTOLIC BLOOD PRESSURE: 70 MMHG | SYSTOLIC BLOOD PRESSURE: 120 MMHG

## 2022-11-19 VITALS — SYSTOLIC BLOOD PRESSURE: 100 MMHG | DIASTOLIC BLOOD PRESSURE: 58 MMHG

## 2022-11-19 RX ADMIN — DIVALPROEX SODIUM SCH MG: 250 TABLET, DELAYED RELEASE ORAL at 20:07

## 2022-11-19 RX ADMIN — DIVALPROEX SODIUM SCH MG: 250 TABLET, DELAYED RELEASE ORAL at 08:12

## 2022-11-19 RX ADMIN — DOCUSATE SODIUM SCH MG: 100 CAPSULE, LIQUID FILLED ORAL at 08:11

## 2022-11-19 RX ADMIN — IBUPROFEN PRN MG: 400 TABLET, FILM COATED ORAL at 13:28

## 2022-11-19 RX ADMIN — BUPRENORPHINE AND NALOXONE SCH TAB: 2; .5 TABLET SUBLINGUAL at 08:12

## 2022-11-19 RX ADMIN — NICOTINE SCH PATCH: 21 PATCH, EXTENDED RELEASE TRANSDERMAL at 08:16

## 2022-11-19 RX ADMIN — BENZTROPINE MESYLATE SCH MG: 1 TABLET ORAL at 20:06

## 2022-11-19 RX ADMIN — NICOTINE POLACRILEX PRN MG: 2 LOZENGE ORAL at 18:09

## 2022-11-19 RX ADMIN — IBUPROFEN PRN MG: 400 TABLET, FILM COATED ORAL at 08:11

## 2022-11-19 RX ADMIN — BENZTROPINE MESYLATE SCH MG: 1 TABLET ORAL at 08:12

## 2022-11-19 NOTE — NUR
Nursing Progress Note      



Problem: Per 5150 pt. is here for SA by OD on street drugs after command hallucinations 
telling him to do so. Pt. states that he took "Fentanyl, black china, meth, 8 ball" in 
suicide attempt. Pt. states he feels hopeless. Pt. is also delusional, stating, "they won't 
give me my billion dollars". Pt. states, "I tried killing myself because Im a free renetta. 
They dont love me but I love them. Yes I do."



Interventions: 1:1 assessment, medication administration/education/monitoring, therapeutic 
conversation, active listening, ensured contract for safety, behavior monitoring and 
intervention as needed; reality orientation, provided distraction, redirection, 
encouragement, positive reinforcement, and Q15 minute safety checks.



Response:  Pt resting in bed at start of shift.  Pt denies wanting to harm self or others. 
Pt denies hearing or seeing things. Pt states I feel like Im losing my memory, its more 
like a dream. Like when I am having a conversation like right now and then go to sleep and 
wake up its like a dream the conversation. HS meds administered. Offered evening snack and 
pt states no thank you Im on a diet. Pt had a good night and slept well.



Plan: Crisis interruption and stabilization in a safe and therapeutic environment. Pt is on 
a TCON. Pt is awaiting for his county to arrange a court date.

## 2022-11-19 NOTE — NUR
Nursing Progress Note      



Problem: Per 5150 pt. is here for SA by OD on street drugs after command hallucinations 
telling him to do so. Pt. states that he took "Fentanyl, black china, meth, 8 ball" in 
suicide attempt. Pt. states he feels hopeless. Pt. is also delusional, stating, "they won't 
give me my billion dollars". Pt. states, "I tried killing myself because Im a free renetta. 
They dont love me but I love them. Yes I do."



Interventions: 1:1 assessment, medication administration/education/monitoring, therapeutic 
conversation, active listening, ensured contract for safety, behavior monitoring and 
intervention as needed; reality orientation, provided distraction, redirection, 
encouragement, positive reinforcement, and Q15 minute safety checks.



Response: Pt was up before breakfast and cooperative with medications. Pt c/o 4/10 right ear 
pain and was given PRN Ibuprofen 400 mg at 0811 with good effect. Pt had a notable increase 
in slurring his words after taking morning medications including Suboxone as well as 
pinpoint pupils. Pt c/o left knee pain 6/10 at 1020 and was given PRN Tylenol 650 mg with 
good effect. Pt stated that he did a $20 line of meth and hurt his knee, it popped out of 
socket and he popped it back in. Pt requested PRN Klonopin 2 mg at 1337 for increased 
anxiety. Pt stated that he knows he has to stay calm and stay out of trouble so he can be 
placed somewhere. Pt expressed that he feels his home [facility]  is not responsive 
because she doesn't like his mom. Pt states that she doesn't believe he is Bipolar but that 
it is all from drugs and blames his mom. Pt reports that he graduated at 6th grade level for 
reading and math. Pt states that he has always been on the spectrum. Pt expressed remorse 
that he did not cry at his dad's , everyone else did but he didn't. Pt states that 
thinking about this made him feel suicidal in the past. Pt is restless and bored, he paces 
the unit, he listens to radio headphones, he socializes with staff and his roommate. Pt 
denies SI/HI/AH/VH.







Plan: Crisis interruption and stabilization in a safe and therapeutic environment. Pt is on 
a TCON. Pt is awaiting for his county to arrange a court date.

-------------------------------------------------------------------------------

Addendum: 22 at 1645 by Dolly Kebede RN (Lee)

-------------------------------------------------------------------------------

Pt napped after morning meds, when he woke up pt stated that he did not remember who his 
nurse was and did not remember being given his meds.

## 2022-11-20 VITALS — SYSTOLIC BLOOD PRESSURE: 152 MMHG | DIASTOLIC BLOOD PRESSURE: 62 MMHG

## 2022-11-20 VITALS — SYSTOLIC BLOOD PRESSURE: 140 MMHG | DIASTOLIC BLOOD PRESSURE: 80 MMHG

## 2022-11-20 RX ADMIN — DIVALPROEX SODIUM SCH MG: 250 TABLET, DELAYED RELEASE ORAL at 20:11

## 2022-11-20 RX ADMIN — BENZTROPINE MESYLATE SCH MG: 1 TABLET ORAL at 20:12

## 2022-11-20 RX ADMIN — BENZTROPINE MESYLATE SCH MG: 1 TABLET ORAL at 07:56

## 2022-11-20 RX ADMIN — NICOTINE SCH PATCH: 21 PATCH, EXTENDED RELEASE TRANSDERMAL at 07:56

## 2022-11-20 RX ADMIN — IBUPROFEN PRN MG: 400 TABLET, FILM COATED ORAL at 19:06

## 2022-11-20 RX ADMIN — BUPRENORPHINE AND NALOXONE SCH TAB: 2; .5 TABLET SUBLINGUAL at 07:56

## 2022-11-20 RX ADMIN — DOCUSATE SODIUM SCH MG: 100 CAPSULE, LIQUID FILLED ORAL at 07:57

## 2022-11-20 RX ADMIN — DIVALPROEX SODIUM SCH MG: 250 TABLET, DELAYED RELEASE ORAL at 07:56

## 2022-11-20 NOTE — NUR
Nursing Progress Note      



Problem: Per 5150 pt. is here for SA by OD on street drugs after command hallucinations 
telling him to do so. Pt. states that he took "Fentanyl, black china, meth, 8 ball" in 
suicide attempt. Pt. states he feels hopeless. Pt. is also delusional, stating, "they won't 
give me my billion dollars". Pt. states, "I tried killing myself because Im a free renetta. 
They dont love me but I love them. Yes I do."



Interventions: 1:1 assessment, medication administration/education/monitoring, therapeutic 
conversation, active listening, ensured contract for safety, behavior monitoring and 
intervention as needed; reality orientation, provided distraction, redirection, 
encouragement, positive reinforcement, and Q15 minute safety checks.



Response:  Patient up early for breakfast and AM meds. He is calm and cooperative with 
nursing, and remains compliant with all medications. Patient is present for all meals and 
snacks through the day. He spends the day pacing the halls, usually with headphones on. 
Patient is working hard at staying calm on the unit, so he can be placed. He knows that he 
has evil thoughts, but is trying to reverse that, and he wants to be more Sabianist 
oriented. Patient has been here a long time, and does not believe his PG is trying to help 
get him placed. He needs a lot of positive encouragement that is helpful to him, and he 
responds well. 



Plan: Crisis interruption and stabilization in a safe and therapeutic environment. Pt is on 
a TCON. Pt is awaiting for his county to arrange a court date.

## 2022-11-20 NOTE — NUR
Nursing Progress Note      



Problem: Per 5150 pt. is here for SA by OD on street drugs after command hallucinations 
telling him to do so. Pt. states that he took "Fentanyl, black china, meth, 8 ball" in 
suicide attempt. Pt. states he feels hopeless. Pt. is also delusional, stating, "they won't 
give me my billion dollars". Pt. states, "I tried killing myself because Im a free renetta. 
They dont love me but I love them. Yes I do."



Interventions: 1:1 assessment, medication administration/education/monitoring, therapeutic 
conversation, active listening, ensured contract for safety, behavior monitoring and 
intervention as needed; reality orientation, provided distraction, redirection, 
encouragement, positive reinforcement, and Q15 minute safety checks.



Response: Patient was observed standing in hallway talking to other patient in the hallway. 
Patient went back and forth between his room and community room. Patient came up to nurse to 
tell them how much weight he has already lost. Patient requested to have his medications 
early so he can get back to bed. Patient agreed to participate in snack today and took all 
night medications without issue. Patient paced the singh for a little while before heading to 
bed.



Plan: Crisis interruption and stabilization in a safe and therapeutic environment. Pt is on 
a TCON. Pt is awaiting for his county to arrange a court date.

## 2022-11-21 VITALS — SYSTOLIC BLOOD PRESSURE: 111 MMHG | DIASTOLIC BLOOD PRESSURE: 61 MMHG

## 2022-11-21 VITALS — DIASTOLIC BLOOD PRESSURE: 75 MMHG | SYSTOLIC BLOOD PRESSURE: 128 MMHG

## 2022-11-21 VITALS — DIASTOLIC BLOOD PRESSURE: 96 MMHG | SYSTOLIC BLOOD PRESSURE: 152 MMHG

## 2022-11-21 RX ADMIN — BUPRENORPHINE AND NALOXONE SCH TAB: 2; .5 TABLET SUBLINGUAL at 07:15

## 2022-11-21 RX ADMIN — IBUPROFEN PRN MG: 400 TABLET, FILM COATED ORAL at 13:16

## 2022-11-21 RX ADMIN — DOCUSATE SODIUM SCH MG: 100 CAPSULE, LIQUID FILLED ORAL at 07:17

## 2022-11-21 RX ADMIN — DIVALPROEX SODIUM SCH MG: 250 TABLET, DELAYED RELEASE ORAL at 20:33

## 2022-11-21 RX ADMIN — DIVALPROEX SODIUM SCH MG: 250 TABLET, DELAYED RELEASE ORAL at 07:16

## 2022-11-21 RX ADMIN — IBUPROFEN PRN MG: 400 TABLET, FILM COATED ORAL at 08:26

## 2022-11-21 RX ADMIN — NICOTINE SCH PATCH: 21 PATCH, EXTENDED RELEASE TRANSDERMAL at 07:15

## 2022-11-21 RX ADMIN — NICOTINE POLACRILEX PRN MG: 2 LOZENGE ORAL at 13:12

## 2022-11-21 RX ADMIN — ONDANSETRON PRN MG: 4 TABLET, ORALLY DISINTEGRATING ORAL at 00:55

## 2022-11-21 RX ADMIN — BENZTROPINE MESYLATE SCH MG: 1 TABLET ORAL at 20:33

## 2022-11-21 RX ADMIN — BENZTROPINE MESYLATE SCH MG: 1 TABLET ORAL at 07:16

## 2022-11-21 NOTE — NUR
Nursing Progress Note      



Problem: Per 5150 pt. is here for SA by OD on street drugs after command hallucinations 
telling him to do so. Pt. states that he took "Fentanyl, black china, meth, 8 ball" in 
suicide attempt. Pt. states he feels hopeless. Pt. is also delusional, stating, "they won't 
give me my billion dollars". Pt. states, "I tried killing myself because Im a free renetta. 
They dont love me but I love them. Yes I do."



Interventions: 1:1 assessment, medication administration/education/monitoring, therapeutic 
conversation, active listening, ensured contract for safety, behavior monitoring and 
intervention as needed; reality orientation, provided distraction, redirection, 
encouragement, positive reinforcement, and Q15 minute safety checks.



Response: Patient is pleasant and cooperative with care; compliant with medication. PRN 
Motrin for hand pain and PRN Zofran provided. Patient reported removing his Nicotine prior 
to med pass. He denies SI, HI, A/VH; no apparent delusions were expressed this shift but 
some disorganized thought content. Patient social with staff and roommate, apologized to 
peer that he had an altercation with during previous shift and participated in HS snack 
prior to bed. He was observed sleeping and did not appear to be having difficulty; around 
0030 patient was heard retching. Patient was found vomiting; no temperature and only c/o 
pain patient had reported was HA d/t vomiting. N/O received from Dr. Ramirez for Zofran 4mg 
PO PRN Q4hrs; patient was provided one dose and has been sleeping with no apparent 
difficulties since. 



Plan: Crisis interruption and stabilization in a safe and therapeutic environment. Pt is on 
a TCON. Pt is awaiting for his county to arrange a court date.

## 2022-11-21 NOTE — NUR
Nursing Progress Note      



Problem: Per 5150 pt. is here for SA by OD on street drugs after command hallucinations 
telling him to do so. Pt. states that he took "Fentanyl, black china, meth, 8 ball" in 
suicide attempt. Pt. states he feels hopeless. Pt. is also delusional, stating, "they won't 
give me my billion dollars". Pt. states, "I tried killing myself because Im a free renetta. 
They dont love me but I love them. Yes I do."



Interventions: 1:1 assessment, medication administration/education/monitoring, therapeutic 
conversation, active listening, ensured contract for safety, behavior monitoring and 
intervention as needed; reality orientation, provided distraction, redirection, 
encouragement, positive reinforcement, and Q15 minute safety checks.



Response:  Patient awake at shift change.  Patient is complaining that his teeth hurt, 
Tylenol given without effectiveness, then administered Motrin. Patient takes his medications 
as directed.  Speech is slightly pressured and thought processes are a little disorganized. 
Patient paces hallways with headphones on. Patient is hyperverbal after medications with 
staff.  Talks a lot about Abel and the devil.  Patient states that he is happy, and is 
now walking hallways rapping to music.  Patient informed JEANIE Scott, that he felt like he 
was on meth, as she noted the last two days he has been acting hypomanic.



Patient has been pacing the hallways nonstop and complaining of left ankle pain and wanted 
Tylenol before it was due, RN requested that he go lay down.  Patient started yelling in the 
hallways, stating that he was trying to lose weight and would not go lay down. 1500 meds of 
Ativan and Haldol administered with Klonopin.  Patient apologized after explosive episode.  
At 16:45, patient came to RN and asked if he had any scheduled meds, stated that he felt 
stressed and hopeless.  He then went to lie down on his bed.



Plan: Crisis interruption and stabilization in a safe and therapeutic environment. Pt is on 
a TCON. Pt is awaiting for his county to arrange a court date.

## 2022-11-22 VITALS — SYSTOLIC BLOOD PRESSURE: 133 MMHG | DIASTOLIC BLOOD PRESSURE: 71 MMHG

## 2022-11-22 VITALS — DIASTOLIC BLOOD PRESSURE: 89 MMHG | SYSTOLIC BLOOD PRESSURE: 147 MMHG

## 2022-11-22 RX ADMIN — DIVALPROEX SODIUM SCH MG: 250 TABLET, DELAYED RELEASE ORAL at 20:37

## 2022-11-22 RX ADMIN — NICOTINE SCH PATCH: 21 PATCH, EXTENDED RELEASE TRANSDERMAL at 07:17

## 2022-11-22 RX ADMIN — BUPRENORPHINE AND NALOXONE SCH TAB: 2; .5 TABLET SUBLINGUAL at 07:18

## 2022-11-22 RX ADMIN — DOCUSATE SODIUM SCH MG: 100 CAPSULE, LIQUID FILLED ORAL at 07:18

## 2022-11-22 RX ADMIN — BENZTROPINE MESYLATE SCH MG: 1 TABLET ORAL at 20:37

## 2022-11-22 RX ADMIN — NICOTINE POLACRILEX PRN MG: 2 LOZENGE ORAL at 18:39

## 2022-11-22 RX ADMIN — BENZTROPINE MESYLATE SCH MG: 1 TABLET ORAL at 07:18

## 2022-11-22 RX ADMIN — IBUPROFEN PRN MG: 400 TABLET, FILM COATED ORAL at 19:07

## 2022-11-22 RX ADMIN — IBUPROFEN PRN MG: 400 TABLET, FILM COATED ORAL at 12:13

## 2022-11-22 RX ADMIN — DIVALPROEX SODIUM SCH MG: 250 TABLET, DELAYED RELEASE ORAL at 07:17

## 2022-11-22 NOTE — NUR
Nursing Progress Note      



Problem: Per 5150 pt. is here for SA by OD on street drugs after command hallucinations 
telling him to do so. Pt. states that he took "Fentanyl, black china, meth, 8 ball" in 
suicide attempt. Pt. states he feels hopeless. Pt. is also delusional, stating, "they won't 
give me my billion dollars". Pt. states, "I tried killing myself because Im a free renetta. 
They dont love me but I love them. Yes I do."



Interventions: 1:1 assessment, medication administration/education/monitoring, therapeutic 
conversation, active listening, ensured contract for safety, behavior monitoring and 
intervention as needed; reality orientation, provided distraction, redirection, 
encouragement, positive reinforcement, and Q15 minute safety checks.



Response: Patient is pleasant and cooperative with care; compliant with medication. He 
reported removing Nicotine patch prior to writer attempting to remove it. Patient reported 
feeling "not great but better." Denies N/V this shift. No negative behaviors presented. He 
denies SI, HI, A/VH; no apparent delusions expressed. Patient remained in bed throughout the 
shift; observed sleeping and does not appear to be having difficulty. 



Plan: Crisis interruption and stabilization in a safe and therapeutic environment. Pt is on 
a TCON. Pt is awaiting for his county to arrange a court date.

## 2022-11-22 NOTE — NUR
Nursing Progress Note      



Problem: Per 5150 pt. is here for SA by OD on street drugs after command hallucinations 
telling him to do so. Pt. states that he took "Fentanyl, black china, meth, 8 ball" in 
suicide attempt. Pt. states he feels hopeless. Pt. is also delusional, stating, "they won't 
give me my billion dollars". Pt. states, "I tried killing myself because Im a free renetta. 
They dont love me but I love them. Yes I do."



Interventions: 1:1 assessment, medication administration/education/monitoring, therapeutic 
conversation, active listening, ensured contract for safety, behavior monitoring and 
intervention as needed; reality orientation, provided distraction, redirection, 
encouragement, positive reinforcement, and Q15 minute safety checks.



Response:  Patient was up for breakfast this morning. He ate and accepted his medications. 
Patient has been walking a lot. At one time he gained a lot of weight from medications, and 
doesnt want to do it again. Also, patient states that as a child he was fat, and was 
ridiculed and bullied because of it. Its understood why he doesnt want to go there again. 
He has been c/o tooth pain form a cavity, and gets little relief from Tylenol. His Motrin 
helps some. Patient is doing everything he can to be happy and not hopeless. Headphones and 
music help. 



Plan: Crisis interruption and stabilization in a safe and therapeutic environment. Pt is on 
a TCON. Pt is awaiting for his county to arrange a court date.

## 2022-11-23 VITALS — SYSTOLIC BLOOD PRESSURE: 98 MMHG | DIASTOLIC BLOOD PRESSURE: 55 MMHG

## 2022-11-23 VITALS — SYSTOLIC BLOOD PRESSURE: 125 MMHG | DIASTOLIC BLOOD PRESSURE: 71 MMHG

## 2022-11-23 VITALS — DIASTOLIC BLOOD PRESSURE: 60 MMHG | SYSTOLIC BLOOD PRESSURE: 100 MMHG

## 2022-11-23 RX ADMIN — NICOTINE SCH PATCH: 21 PATCH, EXTENDED RELEASE TRANSDERMAL at 07:21

## 2022-11-23 RX ADMIN — DIVALPROEX SODIUM SCH MG: 250 TABLET, DELAYED RELEASE ORAL at 21:05

## 2022-11-23 RX ADMIN — BUPRENORPHINE AND NALOXONE SCH TAB: 2; .5 TABLET SUBLINGUAL at 07:14

## 2022-11-23 RX ADMIN — DOCUSATE SODIUM SCH MG: 100 CAPSULE, LIQUID FILLED ORAL at 07:14

## 2022-11-23 RX ADMIN — NICOTINE POLACRILEX PRN MG: 2 LOZENGE ORAL at 14:21

## 2022-11-23 RX ADMIN — BENZTROPINE MESYLATE SCH MG: 1 TABLET ORAL at 21:04

## 2022-11-23 RX ADMIN — DIVALPROEX SODIUM SCH MG: 250 TABLET, DELAYED RELEASE ORAL at 07:14

## 2022-11-23 RX ADMIN — IBUPROFEN PRN MG: 400 TABLET, FILM COATED ORAL at 11:56

## 2022-11-23 RX ADMIN — BENZTROPINE MESYLATE SCH MG: 1 TABLET ORAL at 07:14

## 2022-11-23 NOTE — NUR
Nursing Progress Note      



Problem: Per 5150 pt. is here for SA by OD on street drugs after command hallucinations 
telling him to do so. Pt. states that he took "Fentanyl, black china, meth, 8 ball" in 
suicide attempt. Pt. states he feels hopeless. Pt. is also delusional, stating, "they won't 
give me my billion dollars". Pt. states, "I tried killing myself because Im a free renetta. 
They dont love me but I love them. Yes I do."



Interventions: 1:1 assessment, medication administration/education/monitoring, therapeutic 
conversation, active listening, ensured contract for safety, behavior monitoring and 
intervention as needed; reality orientation, provided distraction, redirection, 
encouragement, positive reinforcement, and Q15 minute safety checks.



Response: Patient is pleasant and cooperative with care; compliant with medication. PRN 
Clonazepam and Motrin provided. Reported he removed his Nicotine patch. Patient boisterous 
this shift: social, singing and joking with staff. He is hyperreligious and preoccupied with 
losing weight. Patient was overheard talking about his brother murdering people; he was 
easily redirected. He is observed sleeping and does not appear to be having difficulty. 



Plan: Crisis interruption and stabilization in a safe and therapeutic environment. Pt is on 
a TCON. Pt is awaiting for his county to arrange a court date.

## 2022-11-23 NOTE — NUR
Nursing Progress Note      



Problem: Per 5150 pt. is here for SA by OD on street drugs after command hallucinations 
telling him to do so. Pt. states that he took "Fentanyl, black china, meth, 8 ball" in 
suicide attempt. Pt. states he feels hopeless. Pt. is also delusional, stating, "they won't 
give me my billion dollars". Pt. states, "I tried killing myself because Im a free renetta. 
They dont love me but I love them. Yes I do."



Interventions: 1:1 assessment, medication administration/education/monitoring, therapeutic 
conversation, active listening, ensured contract for safety, behavior monitoring and 
intervention as needed; reality orientation, provided distraction, redirection, 
encouragement, positive reinforcement, and Q15 minute safety checks.



Response:  RN received pt. asleep in bed at start of shift. Pt. awoke for breakfast and took 
all medications. Pt. went back to sleep after breakfast. Pt. awoke mid-morning and requested 
headphones. Pt. observed pacing the unit and listening to music. Pt. apologized to female RN 
for his behavior in previous days. Pt. states, I kept hounding you guys about my placement, 
but I realize you guys dont have anything to do with that. Pt. continued to pace 
throughout the day, stating, Im trying to loose weight. Pt. weighed himself and became 
anxious, stating, Im going to freak out, I havent lost any weight. Pt. requested 
anxiolytic and received Klonopin 2mg with good effect. Pt. started on Nyproxen for left 
ankle pain. 



Plan: Crisis interruption and stabilization in a safe and therapeutic environment. Pt is on 
a TCON. Pt is awaiting for his county to arrange a court date.

## 2022-11-24 VITALS — DIASTOLIC BLOOD PRESSURE: 52 MMHG | SYSTOLIC BLOOD PRESSURE: 108 MMHG

## 2022-11-24 VITALS — SYSTOLIC BLOOD PRESSURE: 110 MMHG | DIASTOLIC BLOOD PRESSURE: 73 MMHG

## 2022-11-24 RX ADMIN — DOCUSATE SODIUM SCH MG: 100 CAPSULE, LIQUID FILLED ORAL at 08:20

## 2022-11-24 RX ADMIN — NICOTINE POLACRILEX PRN MG: 2 LOZENGE ORAL at 19:10

## 2022-11-24 RX ADMIN — BUPRENORPHINE AND NALOXONE SCH TAB: 2; .5 TABLET SUBLINGUAL at 08:21

## 2022-11-24 RX ADMIN — BENZTROPINE MESYLATE SCH MG: 1 TABLET ORAL at 08:21

## 2022-11-24 RX ADMIN — NICOTINE SCH PATCH: 21 PATCH, EXTENDED RELEASE TRANSDERMAL at 08:25

## 2022-11-24 RX ADMIN — BENZTROPINE MESYLATE SCH MG: 1 TABLET ORAL at 20:42

## 2022-11-24 RX ADMIN — DIVALPROEX SODIUM SCH MG: 250 TABLET, DELAYED RELEASE ORAL at 20:42

## 2022-11-24 RX ADMIN — DIVALPROEX SODIUM SCH MG: 250 TABLET, DELAYED RELEASE ORAL at 08:21

## 2022-11-24 NOTE — NUR
Nursing Progress Note      



Problem: Per 5150 pt. is here for SA by OD on street drugs after command hallucinations 
telling him to do so. Pt. states that he took "Fentanyl, black china, meth, 8 ball" in 
suicide attempt. Pt. states he feels hopeless. Pt. is also delusional, stating, "they won't 
give me my billion dollars". Pt. states, "I tried killing myself because Im a free renetta. 
They dont love me but I love them. Yes I do."



Interventions: 1:1 assessment, medication administration/education/monitoring, therapeutic 
conversation, active listening, ensured contract for safety, behavior monitoring and 
intervention as needed; reality orientation, provided distraction, redirection, 
encouragement, positive reinforcement, and Q15 minute safety checks.



Response: Patient is pleasant and cooperative with care; compliant with medication. Prazosin 
was held for decreased BP. He reported he removed Nicotine patch. Patient denies SI, HI, 
A/VH; continues to perseverate on weight loss. Patient isolated to his room this shift and 
did not participate in HS snack; observed sleeping and does not appear to be having 
difficulty. 



Plan: Crisis interruption and stabilization in a safe and therapeutic environment. Pt is on 
a TCON. Pt is awaiting for his county to arrange a court date.

## 2022-11-24 NOTE — NUR
THERAPUETIC GROUP

DESCRIPTION Daily therapeutic groups support [facility] crisis-recovery environment, and meet 
medical necessity given the acuity of client symptoms.

Topic:  self-soothing -- focusing on a happy and neutral thing (dream car, dream location, 
details of car).

INTERVENTION Therapeutic communication, active listening, redirection as needed, peer 
support, validation by peers, coping skills and psychosocial education.  Secondarily: 
alleviating and discouraging isolation. 

RESPONSE Client engaged in group, creating a collage of his dream car, dream location, and 
the number "33" which he states protects him because it is related to Abel (age?). 

TREATMENT Until stable, client requires time in a safe and therapeutic environment employing 
multidisciplinary treatments, including therapeutic groups.

## 2022-11-24 NOTE — NUR
Nursing Progress Note      



Problem: Per 5150 pt. is here for SA by OD on street drugs after command hallucinations 
telling him to do so. Pt. states that he took "Fentanyl, black china, meth, 8 ball" in 
suicide attempt. Pt. states he feels hopeless. Pt. is also delusional, stating, "they won't 
give me my billion dollars". Pt. states, "I tried killing myself because Im a free renetta. 
They dont love me but I love them. Yes I do."



Interventions: 1:1 assessment, medication administration/education/monitoring, therapeutic 
conversation, active listening, ensured contract for safety, behavior monitoring and 
intervention as needed; reality orientation, provided distraction, redirection, limit 
setting, encouragement, positive reinforcement, and Q15 minute safety checks.



Response:  Pt was up for breakfast and cooperative with medications. Pt indicated that he 
was looking forward to eating some stuffing today as today is his "cheat day" for his diet. 
Pt requested that he start receiving cottage cheese and peaches on his lunch trays, diet 
modified to accommodate his request. Pt c/o 4/10 bilateral thigh pain this morning and asked 
for Tylenol which was given at 0921 with good effect, pt also received routine Naprosyn. He 
asked for Tylenol again for his legs at 1402. Pt became irritable before lunch. He was 
perseverating on a bald spot on the back of his head, making statements that the police 
caused it. He stated it was embarrassing, he didn't want any girls to see it. He then began 
perseverating on needing a new hat although he was wearing a perfectly good baseball cap. 
Verbal de-escalation, distraction, and redirection techniques utilized. Pt approached this 
nurse to ask for a "stress pill." Pt was medicated with PRN Klonopin 2 mg at 1311 with good 
effect.



Plan: Crisis interruption and stabilization in a safe and therapeutic environment. Pt is on 
a TCON. Pt is awaiting for his county to arrange a court date.

## 2022-11-25 VITALS — DIASTOLIC BLOOD PRESSURE: 64 MMHG | SYSTOLIC BLOOD PRESSURE: 115 MMHG

## 2022-11-25 VITALS — DIASTOLIC BLOOD PRESSURE: 63 MMHG | SYSTOLIC BLOOD PRESSURE: 101 MMHG

## 2022-11-25 VITALS — DIASTOLIC BLOOD PRESSURE: 67 MMHG | SYSTOLIC BLOOD PRESSURE: 117 MMHG

## 2022-11-25 RX ADMIN — NICOTINE POLACRILEX PRN MG: 2 LOZENGE ORAL at 16:09

## 2022-11-25 RX ADMIN — DOCUSATE SODIUM SCH MG: 100 CAPSULE, LIQUID FILLED ORAL at 08:21

## 2022-11-25 RX ADMIN — DIVALPROEX SODIUM SCH MG: 250 TABLET, DELAYED RELEASE ORAL at 08:20

## 2022-11-25 RX ADMIN — NICOTINE SCH PATCH: 21 PATCH, EXTENDED RELEASE TRANSDERMAL at 08:29

## 2022-11-25 RX ADMIN — BENZTROPINE MESYLATE SCH MG: 1 TABLET ORAL at 08:20

## 2022-11-25 RX ADMIN — THERA TABS SCH EACH: TAB at 11:13

## 2022-11-25 RX ADMIN — DIVALPROEX SODIUM SCH MG: 250 TABLET, DELAYED RELEASE ORAL at 20:18

## 2022-11-25 RX ADMIN — BENZTROPINE MESYLATE SCH MG: 1 TABLET ORAL at 20:17

## 2022-11-25 RX ADMIN — BUPRENORPHINE AND NALOXONE SCH TAB: 2; .5 TABLET SUBLINGUAL at 08:20

## 2022-11-25 NOTE — NUR
Nursing Progress Note      



Problem: Per 5150 pt. is here for SA by OD on street drugs after command hallucinations 
telling him to do so. Pt. states that he took "Fentanyl, black china, meth, 8 ball" in 
suicide attempt. Pt. states he feels hopeless. Pt. is also delusional, stating, "they won't 
give me my billion dollars". Pt. states, "I tried killing myself because Im a free renetta. 
They dont love me but I love them. Yes I do."



Interventions: 1:1 assessment, medication administration/education/monitoring, therapeutic 
conversation, active listening, ensured contract for safety, behavior monitoring and 
intervention as needed; reality orientation, provided distraction, redirection, 
encouragement, positive reinforcement, and Q15 minute safety checks.



Response: Patient is pleasant and cooperative with care; compliant with medication. Patient 
was expressing agitation at the beginning of shift d/t peer making racial comments toward 
him. PRN Clonazepam provided. Patient was thankful with staff "giving advise" and "helping" 
him. No further stressor or acts of irritability expressed this shift. Patient reported 
removing his Nicotine patch. He is observed sleeping and does not appear to be having 
difficulty. 



Plan: Crisis interruption and stabilization in a safe and therapeutic environment. Pt is on 
a TCON. Pt is awaiting for his county to arrange a court date.

## 2022-11-25 NOTE — NUR
Nursing Progress Note      



Problem: Per 5150 pt. is here for SA by OD on street drugs after command hallucinations 
telling him to do so. Pt. states that he took "Fentanyl, black china, meth, 8 ball" in 
suicide attempt. Pt. states he feels hopeless. Pt. is also delusional, stating, "they won't 
give me my billion dollars". Pt. states, "I tried killing myself because Im a free renetta. 
They dont love me but I love them. Yes I do."



Interventions: 1:1 assessment, medication administration/education/monitoring, therapeutic 
conversation, active listening, ensured contract for safety, behavior monitoring and 
intervention as needed; reality orientation, provided distraction, redirection, 
encouragement, positive reinforcement, and Q15 minute safety checks.



Response:  RN received pt. asleep in bed at start of shift. Pt. awoke for breakfast and took 
all medications. Pt. went back to sleep after breakfast. Pt. awoke mid-morning and requested 
headphones. Pt. observed pacing the unit and listening to music. 1:1 done at bedside, pt. 
reports that he hopes he gets out soon, and feels depressed that he is still here. Pt. c/o 
left ankle pain and given Tylenol 650mg po x1 with moderate effect. Pt. c/o of anxiety in 
the afternoon and received scheduled 1500 dose of Ativan 1mg and Haldol 5mg po with moderate 
effect. In the evening pt. became anxious and received Klonopin 2mg with good effect. 



Plan: Crisis interruption and stabilization in a safe and therapeutic environment. Pt is on 
a TCON. Pt is awaiting for his county to arrange a court date.

## 2022-11-25 NOTE — NUR
F/u 11/25:: Pt continues eating well on Vegetarian diet, documented with

mostly 100% PO intake while receiving double protein TID. EMANUEL d/w RN 

regarding MVI supplementation if MD agreeable given vegetarian 

restriction. LBM 11/23 receiving routine colace. Will continue to follow.

Recommendations:

1) Continue Vegetarian diet per pt request

2) Double protein TID

3) MVI or at least B12 supplementation given vegetarian restriction if 

physician agreeable

4) Bowel care PRN

5) Weekly scaled weights

-------------------------------------------------------------------------------

Addendum: 11/25/22 at 0944 by Jordan Godfrey RD

-------------------------------------------------------------------------------

Amended: Links added.

## 2022-11-26 VITALS — DIASTOLIC BLOOD PRESSURE: 75 MMHG | SYSTOLIC BLOOD PRESSURE: 110 MMHG

## 2022-11-26 VITALS — SYSTOLIC BLOOD PRESSURE: 92 MMHG | DIASTOLIC BLOOD PRESSURE: 48 MMHG

## 2022-11-26 VITALS — DIASTOLIC BLOOD PRESSURE: 52 MMHG | SYSTOLIC BLOOD PRESSURE: 95 MMHG

## 2022-11-26 RX ADMIN — DIVALPROEX SODIUM SCH MG: 250 TABLET, DELAYED RELEASE ORAL at 20:30

## 2022-11-26 RX ADMIN — BUPRENORPHINE AND NALOXONE SCH TAB: 2; .5 TABLET SUBLINGUAL at 08:29

## 2022-11-26 RX ADMIN — DOCUSATE SODIUM SCH MG: 100 CAPSULE, LIQUID FILLED ORAL at 08:29

## 2022-11-26 RX ADMIN — BENZTROPINE MESYLATE SCH MG: 1 TABLET ORAL at 08:29

## 2022-11-26 RX ADMIN — NICOTINE SCH PATCH: 21 PATCH, EXTENDED RELEASE TRANSDERMAL at 08:33

## 2022-11-26 RX ADMIN — BENZTROPINE MESYLATE SCH MG: 1 TABLET ORAL at 20:29

## 2022-11-26 RX ADMIN — THERA TABS SCH EACH: TAB at 08:29

## 2022-11-26 RX ADMIN — DIVALPROEX SODIUM SCH MG: 250 TABLET, DELAYED RELEASE ORAL at 08:29

## 2022-11-26 NOTE — NUR
Nursing Progress Note      



Problem: Per 5150 pt. is here for SA by OD on street drugs after command hallucinations 
telling him to do so. Pt. states that he took "Fentanyl, black china, meth, 8 ball" in 
suicide attempt. Pt. states he feels hopeless. Pt. is also delusional, stating, "they won't 
give me my billion dollars". Pt. states, "I tried killing myself because Im a free renetta. 
They dont love me but I love them. Yes I do."



Interventions: 1:1 assessment, medication administration/education/monitoring, therapeutic 
conversation, active listening, ensured contract for safety, behavior monitoring and 
intervention as needed; reality orientation, provided distraction, redirection, 
encouragement, positive reinforcement, and Q15 minute safety checks.



Response:  RN received pt. asleep in bed at start of shift. Pt. awoke for breakfast and took 
all medications. Pt. went back to sleep after breakfast. Pt. awoke mid-morning observed 
pacing the unit with male peer. 1:1 done at bedside, pt. reports that he feels depressed 
thinking he might be here for his birthday in December, pt. states, It makes me feel like 
crying. Pt. c/o left ankle pain and given Tylenol 650mg po x1 with moderate effect. Pt. c/o 
of anxiety in the evening and received Klonopin 2mg with good effect. 



Plan: Crisis interruption and stabilization in a safe and therapeutic environment. Pt is on 
a TCON. Pt is awaiting for his county to arrange a court date.

## 2022-11-26 NOTE — NUR
Nursing Progress Note      



Problem: Per 5150 pt. is here for SA by OD on street drugs after command hallucinations 
telling him to do so. Pt. states that he took "Fentanyl, black china, meth, 8 ball" in 
suicide attempt. Pt. states he feels hopeless. Pt. is also delusional, stating, "they won't 
give me my billion dollars". Pt. states, "I tried killing myself because Im a free renetta. 
They dont love me but I love them. Yes I do."



Interventions: 1:1 assessment, medication administration/education/monitoring, therapeutic 
conversation, active listening, ensured contract for safety, behavior monitoring and 
intervention as needed; reality orientation, provided distraction, redirection, 
encouragement, positive reinforcement, and Q15 minute safety checks.



Response: Patient is pleasant and cooperative with care; compliant with medication. Patient 
reported removing his Nicotine patch. He denies SI, HI, A/VH but endorsed feeling depressed; 
no apparent delusional thought content reported this shift. He remained in his room and did 
not participate in HS snack; observed sleeping and does not appear to be having difficulty. 



Plan: Crisis interruption and stabilization in a safe and therapeutic environment. Pt is on 
a TCON. Pt is awaiting for his county to arrange a court date.

## 2022-11-27 VITALS — DIASTOLIC BLOOD PRESSURE: 60 MMHG | SYSTOLIC BLOOD PRESSURE: 99 MMHG

## 2022-11-27 VITALS — SYSTOLIC BLOOD PRESSURE: 116 MMHG | DIASTOLIC BLOOD PRESSURE: 71 MMHG

## 2022-11-27 RX ADMIN — DIVALPROEX SODIUM SCH MG: 250 TABLET, DELAYED RELEASE ORAL at 20:32

## 2022-11-27 RX ADMIN — DIVALPROEX SODIUM SCH MG: 250 TABLET, DELAYED RELEASE ORAL at 08:20

## 2022-11-27 RX ADMIN — BENZTROPINE MESYLATE SCH MG: 1 TABLET ORAL at 08:19

## 2022-11-27 RX ADMIN — THERA TABS SCH EACH: TAB at 08:20

## 2022-11-27 RX ADMIN — NICOTINE SCH PATCH: 21 PATCH, EXTENDED RELEASE TRANSDERMAL at 08:19

## 2022-11-27 RX ADMIN — BUPRENORPHINE AND NALOXONE SCH TAB: 2; .5 TABLET SUBLINGUAL at 08:25

## 2022-11-27 RX ADMIN — DOCUSATE SODIUM SCH MG: 100 CAPSULE, LIQUID FILLED ORAL at 08:20

## 2022-11-27 RX ADMIN — BENZTROPINE MESYLATE SCH MG: 1 TABLET ORAL at 20:32

## 2022-11-27 NOTE — NUR
Nursing Progress Note      



Problem: Per 5150 pt. is here for SA by OD on street drugs after command hallucinations 
telling him to do so. Pt. states that he took "Fentanyl, black china, meth, 8 ball" in 
suicide attempt. Pt. states he feels hopeless. Pt. is also delusional, stating, "they won't 
give me my billion dollars". Pt. states, "I tried killing myself because Im a free renetta. 
They dont love me but I love them. Yes I do."



Interventions: 1:1 assessment, medication administration/education/monitoring, therapeutic 
conversation, active listening, ensured contract for safety, behavior monitoring and 
intervention as needed; reality orientation, provided distraction, redirection, 
encouragement, positive reinforcement, and Q15 minute safety checks.



Response: Patient is pleasant and cooperative with care; compliant with medication. He 
reported he removed Nicotine patch. Patient denies SI, HI, A/VH; appears depressed this 
shift. He isolated to his room and did not participate in HS snack; observed sleeping and 
does not appear to be having difficulty. 



Plan: Crisis interruption and stabilization in a safe and therapeutic environment. Pt is on 
a TCON. Pt is awaiting for his county to arrange a court date.

## 2022-11-27 NOTE — NUR
Nursing Progress Note      



Problem: Per 5150 pt. is here for SA by OD on street drugs after command hallucinations 
telling him to do so. Pt. states that he took "Fentanyl, black china, meth, 8 ball" in 
suicide attempt. Pt. states he feels hopeless. Pt. is also delusional, stating, "they won't 
give me my billion dollars". Pt. states, "I tried killing myself because Im a free renetta. 
They dont love me but I love them. Yes I do."



Interventions: 1:1 assessment, medication administration/education/monitoring, therapeutic 
conversation, active listening, ensured contract for safety, behavior monitoring and 
intervention as needed; reality orientation, provided distraction, redirection, 
encouragement, positive reinforcement, and Q15 minute safety checks.



Response:  



Received patient sleeping in bed at change of shift.  Patient awakens and requests 
medications, then goes into dining room and eats his meals. Patient reports feeling 
depressed and anxious and requests Klonopin.  Patient reports that another patient is making 
him anxious.  He is also concerned that no matter how he eats and how much he walks that he 
is not losing any weight.  Encouraged patient that he may just be at a plateau and that he 
could do some calisthenics in his room to increase his heart rate, but patient is not 
interested in this option. He has taken on a vegetarian diet to assist with healthy weight 
loss.  Patient reports that he is concerned that even though he has had good behavior, that 
he is not getting placed.  Patient takes his 15:00 medications and then goes to sleep for a 
couple of hours. Patient complains of pain in his left knee and Tylenol was given. Patient 
has been pleasant and cooperative this shift.

 

Plan: Crisis interruption and stabilization in a safe and therapeutic environment. Pt is on 
a TCON. Pt is awaiting for his county to arrange a court date.

## 2022-11-28 VITALS — DIASTOLIC BLOOD PRESSURE: 61 MMHG | SYSTOLIC BLOOD PRESSURE: 111 MMHG

## 2022-11-28 VITALS — DIASTOLIC BLOOD PRESSURE: 53 MMHG | SYSTOLIC BLOOD PRESSURE: 95 MMHG

## 2022-11-28 RX ADMIN — NICOTINE SCH PATCH: 21 PATCH, EXTENDED RELEASE TRANSDERMAL at 08:19

## 2022-11-28 RX ADMIN — BENZTROPINE MESYLATE SCH MG: 1 TABLET ORAL at 20:36

## 2022-11-28 RX ADMIN — NICOTINE POLACRILEX PRN MG: 2 LOZENGE ORAL at 12:24

## 2022-11-28 RX ADMIN — THERA TABS SCH EACH: TAB at 08:15

## 2022-11-28 RX ADMIN — BUPRENORPHINE AND NALOXONE SCH TAB: 2; .5 TABLET SUBLINGUAL at 08:15

## 2022-11-28 RX ADMIN — DIVALPROEX SODIUM SCH MG: 250 TABLET, DELAYED RELEASE ORAL at 20:36

## 2022-11-28 RX ADMIN — DOCUSATE SODIUM SCH MG: 100 CAPSULE, LIQUID FILLED ORAL at 08:14

## 2022-11-28 RX ADMIN — DIVALPROEX SODIUM SCH MG: 250 TABLET, DELAYED RELEASE ORAL at 08:14

## 2022-11-28 RX ADMIN — BENZTROPINE MESYLATE SCH MG: 1 TABLET ORAL at 08:13

## 2022-11-28 NOTE — NUR
PLACEMENT



Sent updated notes and placement packet to [facility]. Maddison Ginna was 
requesting a placement packet.



SIDDHARTHA Salamanca

## 2022-11-28 NOTE — NUR
Nursing Progress Note      



Problem: Per 5150 pt. is here for SA by OD on street drugs after command hallucinations 
telling him to do so. Pt. states that he took "Fentanyl, black china, meth, 8 ball" in 
suicide attempt. Pt. states he feels hopeless. Pt. is also delusional, stating, "they won't 
give me my billion dollars". Pt. states, "I tried killing myself because Im a free renetta. 
They dont love me but I love them. Yes I do."



Interventions: 1:1 assessment, medication administration/education/monitoring, therapeutic 
conversation, active listening, ensured contract for safety, behavior monitoring and 
intervention as needed; reality orientation, provided distraction, redirection, 
encouragement, positive reinforcement, and Q15 minute safety checks.



Response: Patient is pleasant and cooperative with care; compliant with medication. Patient 
reports removing Nicotine patch. He continues to endorse depression; denies SI, HI, A/VH. 
Patient remained in bed throughout the shift; observed sleeping and does not appear to be 
having difficulty.  



Plan: Crisis interruption and stabilization in a safe and therapeutic environment. Pt is on 
a TCON. Pt is awaiting for his county to arrange a court date.

## 2022-11-28 NOTE — NUR
Nursing Progress Note      



Problem: Per 5150 pt. is here for SA by OD on street drugs after command hallucinations 
telling him to do so. Pt. states that he took "Fentanyl, black china, meth, 8 ball" in 
suicide attempt. Pt. states he feels hopeless. Pt. is also delusional, stating, "they won't 
give me my billion dollars". Pt. states, "I tried killing myself because Im a free renetta. 
They dont love me but I love them. Yes I do."



Interventions: Provide medication administration & medication management; Maintained a safe 
& supportive environment; Clear & simple instructions; Direction & encouragement  regarding 
performance of ADLs; monitored behaviors & maintained clear boundaries; Patient physical 
assessment & 1:1 patient interview; Therapeutic conversation & active listening; Patient 
education & monitoring.

 

Response:  Received patient while he was ambulating up and down the hallway before 
breakfast.  AM medications were given and patient returned to bed after breakfast.  Patient 
requested Haldol and Klonopin at approximately 1045 and 1130 this morning.  Patient was calm 
and cooperative all day.  Patient received Tylenol x2 and received relief from his HA.  
Patient received Nicotine Lozenge x1.  Patient took an afternoon nap and was awake in time 
for snack at 1500.  Patient is extremely concerned that he spoke to his Advocate Amy about 
his next placement from here, and her response was I have 500 clients and I cant keep up 
with all of you.  Patient is fearful that nothing is being done regarding his discharge 
from [facility].  Informed the patient that I am here tomorrow and will discuss directly with a 
 here at [facility].  Patient denies hearing voices, AH/VH or feeling suicidal.



Plan: Crisis interruption and stabilization in a safe and therapeutic environment. Pt is on 
a TCON. Pt is awaiting for his [facility] to arrange a court date.

## 2022-11-29 VITALS — SYSTOLIC BLOOD PRESSURE: 139 MMHG | DIASTOLIC BLOOD PRESSURE: 84 MMHG

## 2022-11-29 VITALS — DIASTOLIC BLOOD PRESSURE: 84 MMHG | SYSTOLIC BLOOD PRESSURE: 139 MMHG

## 2022-11-29 VITALS — DIASTOLIC BLOOD PRESSURE: 56 MMHG | SYSTOLIC BLOOD PRESSURE: 118 MMHG

## 2022-11-29 RX ADMIN — DOCUSATE SODIUM SCH MG: 100 CAPSULE, LIQUID FILLED ORAL at 07:22

## 2022-11-29 RX ADMIN — BENZTROPINE MESYLATE SCH MG: 1 TABLET ORAL at 07:22

## 2022-11-29 RX ADMIN — BUPRENORPHINE AND NALOXONE SCH TAB: 2; .5 TABLET SUBLINGUAL at 07:23

## 2022-11-29 RX ADMIN — DIVALPROEX SODIUM SCH MG: 250 TABLET, DELAYED RELEASE ORAL at 07:22

## 2022-11-29 RX ADMIN — BENZTROPINE MESYLATE SCH MG: 1 TABLET ORAL at 20:05

## 2022-11-29 RX ADMIN — DIVALPROEX SODIUM SCH MG: 250 TABLET, DELAYED RELEASE ORAL at 20:03

## 2022-11-29 RX ADMIN — THERA TABS SCH EACH: TAB at 07:23

## 2022-11-29 RX ADMIN — IBUPROFEN PRN MG: 400 TABLET, FILM COATED ORAL at 15:39

## 2022-11-29 RX ADMIN — NICOTINE SCH PATCH: 21 PATCH, EXTENDED RELEASE TRANSDERMAL at 07:30

## 2022-11-29 NOTE — NUR
Nursing Progress Note      



Problem: Per 5150 pt. is here for SA by OD on street drugs after command hallucinations 
telling him to do so. Pt. states that he took "Fentanyl, black china, meth, 8 ball" in 
suicide attempt. Pt. states he feels hopeless. Pt. is also delusional, stating, "they won't 
give me my billion dollars". Pt. states, "I tried killing myself because Im a free renetta. 
They dont love me but I love them. Yes I do."



Interventions: Provide medication administration & medication management; Maintained a safe 
& supportive environment; Clear & simple instructions; Direction & encouragement  regarding 
performance of ADLs; monitored behaviors & maintained clear boundaries; Patient physical 
assessment & 1:1 patient interview; Therapeutic conversation & active listening; Patient 
education & monitoring.

 

Response:  Received patient who was up out of bed and ambulating up and down in the hallway 
before breakfast.  After some coffee/hot chocolate, patient took him am medications.  
Patient then went to the Community Room for breakfast and had a great appetite.  Patient 
took his medications throughout the day as ordered by MD.  Patient spoke directly to ANGELES Lepe, in the hallway regarding any updates she may have regarding his transfer, including the 
conversation he had with Amy when he spoke directly with the patient and informed him I 
also have 500 other clients besides you at this time.  Patient has been despondent since 
yesterday morning hearing that news from Amy, and would like an update as to what is going 
on from Sherley.  Sherley did not have any new information to give to the patient other than they 
are still working on the transfer, and it is a difficult transfer.  Patient c/o pain on his 
bilateral posterior ankle after ambulating and received Tylenol at 1200 which worked only 
minimally.  Per patient request, this Writer spoke with Dr. Perla and received an order for 
Ibuprofen 400mg po tid but only received minimal relief.  Informed the patient to rest and 
elevate his lower extremities to assist with the pain.  Patient had a breakdown at 
approximately 1600 with lots of tears and begging God to put him near his son for placement 
and wished if that did not occur that he not be able to live any longer and requested dying 
by suicide.  Patient was comforted by this Writer and ANYI Scott and patient was able to 
recover from his sobbing after approximately 45 minutes. 



Plan: Crisis interruption and stabilization in a safe and therapeutic environment. Pt is on 
a TCON. Pt is awaiting for his [facility] to arrange a court date.

## 2022-11-29 NOTE — NUR
Nursing Progress Note      



Problem: Per 5150 pt. is here for SA by OD on street drugs after command hallucinations 
telling him to do so. Pt. states that he took "Fentanyl, black china, meth, 8 ball" in 
suicide attempt. Pt. states he feels hopeless. Pt. is also delusional, stating, "they won't 
give me my billion dollars". Pt. states, "I tried killing myself because Im a free renetta. 
They dont love me but I love them. Yes I do."



Interventions: 1:1 assessment, medication administration/education/monitoring, therapeutic 
conversation, active listening, ensured contract for safety, behavior monitoring and 
intervention as needed; reality orientation, provided distraction, redirection, 
encouragement, positive reinforcement, and Q15 minute safety checks.



Response: Patient is pleasant and cooperative with care; compliant with medication. He 
reported he removed his Nicotine patch. Denies SI, HI, A/VH; continues to appear depressed. 
He is isolative to his room, responding minimally and refused to participate in HS snack. 
Patient is observed sleeping and does not appear to be having difficulty. 



Plan: Crisis interruption and stabilization in a safe and therapeutic environment. Pt is on 
a TCON. Pt is awaiting for his county to arrange a court date.

## 2022-11-30 VITALS — DIASTOLIC BLOOD PRESSURE: 54 MMHG | SYSTOLIC BLOOD PRESSURE: 102 MMHG

## 2022-11-30 VITALS — SYSTOLIC BLOOD PRESSURE: 90 MMHG | DIASTOLIC BLOOD PRESSURE: 70 MMHG

## 2022-11-30 VITALS — DIASTOLIC BLOOD PRESSURE: 51 MMHG | SYSTOLIC BLOOD PRESSURE: 99 MMHG

## 2022-11-30 RX ADMIN — THERA TABS SCH EACH: TAB at 08:18

## 2022-11-30 RX ADMIN — DIVALPROEX SODIUM SCH MG: 250 TABLET, DELAYED RELEASE ORAL at 08:18

## 2022-11-30 RX ADMIN — DIVALPROEX SODIUM SCH MG: 250 TABLET, DELAYED RELEASE ORAL at 21:19

## 2022-11-30 RX ADMIN — NICOTINE SCH PATCH: 21 PATCH, EXTENDED RELEASE TRANSDERMAL at 08:17

## 2022-11-30 RX ADMIN — BENZTROPINE MESYLATE SCH MG: 1 TABLET ORAL at 08:18

## 2022-11-30 RX ADMIN — BENZTROPINE MESYLATE SCH MG: 1 TABLET ORAL at 21:18

## 2022-11-30 RX ADMIN — IBUPROFEN PRN MG: 400 TABLET, FILM COATED ORAL at 09:12

## 2022-11-30 RX ADMIN — DOCUSATE SODIUM SCH MG: 100 CAPSULE, LIQUID FILLED ORAL at 08:18

## 2022-11-30 RX ADMIN — BUPRENORPHINE AND NALOXONE SCH TAB: 2; .5 TABLET SUBLINGUAL at 08:17

## 2022-11-30 NOTE — NUR
[facility] CONSERVATORSHIP HEARING 12/15/22 AT 1415 VIA [employer]



Called [place]'s Public Guardian, Linda Lord ([phone]), to inquire when his next 
hearing is. His next hearing is 12/15/22 at 1415 via [employer]. Linda reported she will send 
writer the zoom link.



SIDDHARTHA Salamanca

## 2022-11-30 NOTE — NUR
Nursing Progress Note      



Problem: Per 5150 pt. is here for SA by OD on street drugs after command hallucinations 
telling him to do so. Pt. states that he took "Fentanyl, black china, meth, 8 ball" in 
suicide attempt. Pt. states he feels hopeless. Pt. is also delusional, stating, "they won't 
give me my billion dollars". Pt. states, "I tried killing myself because Im a free renetta. 
They dont love me but I love them. Yes I do."



Interventions: 1:1 assessment, medication administration/education/monitoring, therapeutic 
conversation, active listening, ensured contract for safety, behavior monitoring and 
intervention as needed; reality orientation, provided distraction, redirection, 
encouragement, positive reinforcement, and Q15 minute safety checks.



Response: Received patient when he woke up after 0800 and was awakened for breakfast.  
Patient appears very despondent since his interactions yesterday with his Social Workers.  
Patient informed I think I need to give up and quit living now.  Informed patient that 
there is so much to live for, while now it can be frustrating that we need to keep positive 
thoughts about where he might go as well as will he be near his son.  I informed the patient 
I will continue, as will other Licensed Nursing Staff to talk to the Social Workers or read 
their notes on what the latest update is on his possible transfer outside of [facility], and in 
turn keep him as updated as much as possible.  Patient stated I slept very good last night, 
but Shauna been really down since Amy informed me that she has 500 other patients, and it 
feels like she doesnt care one bit about me.    Patient continued to walk the halls up and 
down the rest of the morning, and at approximately 1230, ANGELES Pina came to [facility] and 
informed patient [facility] in [place] inquired about you and I sent out packets to 
them on Monday afternoon.  There are a few [facility] in the [place] area, so 
we will wait and see if they respond back.  Patient turned to this Writer and asked Is 
that good news? Informed the patient that was very good news, and it would keep him closer 
to his son as well as his family.  Patient celebrated by smiling and hugging those around 
him.  Patient remained in great spirits the rest of the day.  Informed patient that we need 
to take one day at a time, and celebrate any of the good news that comes his way.





Plan: Crisis interruption and stabilization in a safe and therapeutic environment. Pt is on 
a TCON. Pt is awaiting for his county to arrange a court date.

## 2022-11-30 NOTE — NUR
LPS hearing information provided to client

This writer provided information-- a piece of paper with large type--to client that his LPS 
hearing is set, [facility]. 
Client has been been concerned about when his hearing is, and perseverating about clothing 
he wants. Clothing purchases are through public guardian, and client calls on his own.

## 2022-11-30 NOTE — NUR
Nursing Progress Note      



Problem: Per 5150 pt. is here for SA by OD on street drugs after command hallucinations 
telling him to do so. Pt. states that he took "Fentanyl, black china, meth, 8 ball" in 
suicide attempt. Pt. states he feels hopeless. Pt. is also delusional, stating, "they won't 
give me my billion dollars". Pt. states, "I tried killing myself because Im a free renetta. 
They dont love me but I love them. Yes I do."



Interventions: 1:1 assessment, medication administration/education/monitoring, therapeutic 
conversation, active listening, ensured contract for safety, behavior monitoring and 
intervention as needed; reality orientation, provided distraction, redirection, 
encouragement, positive reinforcement, and Q15 minute safety checks.



Response: Patient was agitated and crying while pacing the unit at the beginning of shift; 
he reported several times, "I just want to die." It sounded as if patient hit the door in 
his room and he went back to pacing. Patient did not want to take PO medication at the time 
but was cooperative with IM medication; Ativan 2mg IM, Haldol 10mg IM and Benadryl 50mg IM 
provided without difficulty. He began socializing with PCT and was later apologetic. He 
talked about missing his son and just wishing he was closer to him. He continued, "I just 
don't want my son to think I don't love him." Patient was pleasant and cooperative with HS 
medication; observed sleeping and does not appear to be having difficulty. 



Plan: Crisis interruption and stabilization in a safe and therapeutic environment. Pt is on 
a TCON. Pt is awaiting for his county to arrange a court date.

## 2022-12-01 VITALS — DIASTOLIC BLOOD PRESSURE: 86 MMHG | SYSTOLIC BLOOD PRESSURE: 135 MMHG

## 2022-12-01 VITALS — DIASTOLIC BLOOD PRESSURE: 56 MMHG | SYSTOLIC BLOOD PRESSURE: 102 MMHG

## 2022-12-01 RX ADMIN — BENZTROPINE MESYLATE SCH MG: 1 TABLET ORAL at 20:58

## 2022-12-01 RX ADMIN — BUPRENORPHINE AND NALOXONE SCH TAB: 2; .5 TABLET SUBLINGUAL at 07:52

## 2022-12-01 RX ADMIN — BENZTROPINE MESYLATE SCH MG: 1 TABLET ORAL at 07:53

## 2022-12-01 RX ADMIN — DIVALPROEX SODIUM SCH MG: 250 TABLET, DELAYED RELEASE ORAL at 07:52

## 2022-12-01 RX ADMIN — DOCUSATE SODIUM SCH MG: 100 CAPSULE, LIQUID FILLED ORAL at 07:53

## 2022-12-01 RX ADMIN — NICOTINE SCH PATCH: 21 PATCH, EXTENDED RELEASE TRANSDERMAL at 07:52

## 2022-12-01 RX ADMIN — THERA TABS SCH EACH: TAB at 07:54

## 2022-12-01 RX ADMIN — IBUPROFEN PRN MG: 400 TABLET, FILM COATED ORAL at 18:56

## 2022-12-01 RX ADMIN — DIVALPROEX SODIUM SCH MG: 250 TABLET, DELAYED RELEASE ORAL at 20:58

## 2022-12-01 NOTE — NUR
Nursing Progress Note      



Problem: Per 5150 pt. is here for SA by OD on street drugs after command hallucinations 
telling him to do so. Pt. states that he took "Fentanyl, black china, meth, 8 ball" in 
suicide attempt. Pt. states he feels hopeless. Pt. is also delusional, stating, "they won't 
give me my billion dollars". Pt. states, "I tried killing myself because Im a free renetta. 
They dont love me but I love them. Yes I do."



Interventions: 1:1 assessment, medication administration/education/monitoring, therapeutic 
conversation, active listening, ensured contract for safety, behavior monitoring and 
intervention as needed; reality orientation, provided distraction, redirection, 
encouragement, positive reinforcement, and Q15 minute safety checks.



Response: Patient is pleasant and cooperative with care; compliant with medication. Prazosin 
was held for decreased BP. Nicotine patch removed. He appears depressed; denies SI, HI, 
A/VH. Patient remained in his room throughout the shift; observed sleeping and does not 
appear to be having difficulty. 



Plan: Crisis interruption and stabilization in a safe and therapeutic environment. Pt is on 
a TCON. Pt is awaiting for his county to arrange a court date.

## 2022-12-01 NOTE — NUR
Nursing Progress Note:      



Problem: Per 5150 pt. is here for SA by OD on street drugs after command hallucinations 
telling him to do so. Pt. states that he took "Fentanyl, black china, meth, 8 ball" in 
suicide attempt. Pt. states he feels hopeless. Pt. is also delusional, stating, "they won't 
give me my billion dollars". Pt. states, "I tried killing myself because Im a free renetta. 
They dont love me but I love them. Yes I do."



Interventions: 1:1 assessment, medication administration/education/monitoring, therapeutic 
conversation, active listening, ensured contract for safety, behavior monitoring and 
intervention as needed; reality orientation, provided distraction, redirection, 
encouragement, positive reinforcement, and Q15 minute safety checks.



Response: Patient was up early for breakfast. He eats well and is compliant with all 
medications. His mood is good today, and he is pleasant and cooperative with staff. He has a 
court date for conservatorship soon and hes happy about that. He knows the sooner he is 
conserved the sooner he will get off it. He just wants to be near his son. Patient continues 
to be positively encouraged to remain upbeat. He knows that people here care about him, and 
want the best for him. He just gets discouraged real easily. Patient denies all MH symptoms, 
and none are observed. He spends most of the day pacing with headphones on.



Plan: Crisis interruption and stabilization in a safe and therapeutic environment. Pt is on 
a TCON. Pt is awaiting for his county to arrange a court date.

## 2022-12-02 VITALS — SYSTOLIC BLOOD PRESSURE: 120 MMHG | DIASTOLIC BLOOD PRESSURE: 79 MMHG

## 2022-12-02 VITALS — SYSTOLIC BLOOD PRESSURE: 90 MMHG | DIASTOLIC BLOOD PRESSURE: 41 MMHG

## 2022-12-02 RX ADMIN — BUPRENORPHINE AND NALOXONE SCH TAB: 2; .5 TABLET SUBLINGUAL at 07:22

## 2022-12-02 RX ADMIN — NICOTINE SCH PATCH: 21 PATCH, EXTENDED RELEASE TRANSDERMAL at 07:23

## 2022-12-02 RX ADMIN — BENZTROPINE MESYLATE SCH MG: 1 TABLET ORAL at 07:22

## 2022-12-02 RX ADMIN — DIVALPROEX SODIUM SCH MG: 250 TABLET, DELAYED RELEASE ORAL at 20:42

## 2022-12-02 RX ADMIN — IBUPROFEN PRN MG: 400 TABLET, FILM COATED ORAL at 20:42

## 2022-12-02 RX ADMIN — DIVALPROEX SODIUM SCH MG: 250 TABLET, DELAYED RELEASE ORAL at 07:22

## 2022-12-02 RX ADMIN — BENZTROPINE MESYLATE SCH MG: 1 TABLET ORAL at 20:41

## 2022-12-02 RX ADMIN — THERA TABS SCH EACH: TAB at 07:22

## 2022-12-02 RX ADMIN — DOCUSATE SODIUM SCH MG: 100 CAPSULE, LIQUID FILLED ORAL at 07:23

## 2022-12-02 NOTE — NUR
Nursing Progress Note:      



Problem: Per 5150 pt. is here for SA by OD on street drugs after command hallucinations 
telling him to do so. Pt. states that he took "Fentanyl, black china, meth, 8 ball" in 
suicide attempt. Pt. states he feels hopeless. Pt. is also delusional, stating, "they won't 
give me my billion dollars". Pt. states, "I tried killing myself because Im a free renetta. 
They dont love me but I love them. Yes I do."



Interventions: 1:1 assessment, medication administration/education/monitoring, therapeutic 
conversation, active listening, ensured contract for safety, behavior monitoring and 
intervention as needed; reality orientation, provided distraction, redirection, 
encouragement, positive reinforcement, and Q15 minute safety checks.



Response: Pt resting in bed at start of shift. Pt denies SI/AH/VH. When asked how he is 
doing pt states good but a little depressed, I just want to get out of here. Pt came out 
of his room and walked around then went back in his room after snack. HS meds administered, 
PRN Motrin 400mg administered for 5/10 pain to lt ankle with effectiveness. When asked how 
he hurt his ankle he states Shauna been walking on it since 8:00 this morning. Pt slept 
through the night.



Plan: Crisis interruption and stabilization in a safe and therapeutic environment. Pt is on 
a TCON. Pt is awaiting for his county to arrange a court date.

## 2022-12-02 NOTE — NUR
Nursing Progress Note:      



Problem: Per 5150 pt. is here for SA by OD on street drugs after command hallucinations 
telling him to do so. Pt. states that he took "Fentanyl, black china, meth, 8 ball" in 
suicide attempt. Pt. states he feels hopeless. Pt. is also delusional, stating, "they won't 
give me my billion dollars". Pt. states, "I tried killing myself because Im a free renetta. 
They dont love me but I love them. Yes I do."



Interventions: 1:1 assessment, medication administration/education/monitoring, therapeutic 
conversation, active listening, ensured contract for safety, behavior monitoring and 
intervention as needed; reality orientation, provided distraction, redirection, 
encouragement, positive reinforcement, and Q15 minute safety checks.



Response: Patient was up early for breakfast. He is compliant with all medications. His mood 
is all over the place, so he acts out. He is too protective of staff, and wants to hurt 
anybody that he thinks disrespects us. He has been told over and over that is not his 
rolebut thats his thought process. Patient cannot find anything to be positive about. This 
morning he wanted to hurt his roommate over some perceived slight. They seem to have come to 
some agreement. Then in the afternoon he had a very loud outburst that scared everybody. 
Security was called, and he wanted them to taze him. He ended up getting a B52: 2mg Ativan 
IM, 10mg Haldol IM, and Benadryl 50mg IM. He tolerated it well and finally ended up falling 
asleep.



Plan: Crisis interruption and stabilization in a safe and therapeutic environment. Pt is on 
a TCON. Pt is awaiting for his county to arrange a court date.

## 2022-12-03 VITALS — SYSTOLIC BLOOD PRESSURE: 125 MMHG | DIASTOLIC BLOOD PRESSURE: 92 MMHG

## 2022-12-03 VITALS — DIASTOLIC BLOOD PRESSURE: 60 MMHG | SYSTOLIC BLOOD PRESSURE: 110 MMHG

## 2022-12-03 RX ADMIN — THERA TABS SCH EACH: TAB at 07:38

## 2022-12-03 RX ADMIN — DOCUSATE SODIUM SCH MG: 100 CAPSULE, LIQUID FILLED ORAL at 07:39

## 2022-12-03 RX ADMIN — BUPRENORPHINE AND NALOXONE SCH TAB: 2; .5 TABLET SUBLINGUAL at 11:35

## 2022-12-03 RX ADMIN — BENZTROPINE MESYLATE SCH MG: 1 TABLET ORAL at 20:29

## 2022-12-03 RX ADMIN — DIVALPROEX SODIUM SCH MG: 250 TABLET, DELAYED RELEASE ORAL at 07:38

## 2022-12-03 RX ADMIN — BENZTROPINE MESYLATE SCH MG: 1 TABLET ORAL at 07:38

## 2022-12-03 RX ADMIN — NICOTINE SCH PATCH: 21 PATCH, EXTENDED RELEASE TRANSDERMAL at 07:38

## 2022-12-03 RX ADMIN — DIVALPROEX SODIUM SCH MG: 250 TABLET, DELAYED RELEASE ORAL at 20:29

## 2022-12-03 NOTE — NUR
Nursing Progress Note:      



Problem: Per 5150 pt. is here for SA by OD on street drugs after command hallucinations 
telling him to do so. Pt. states that he took "Fentanyl, black china, meth, 8 ball" in 
suicide attempt. Pt. states he feels hopeless. Pt. is also delusional, stating, "they won't 
give me my billion dollars". Pt. states, "I tried killing myself because Im a free renetta. 
They dont love me but I love them. Yes I do."



Interventions: 1:1 assessment, medication administration/education/monitoring, therapeutic 
conversation, active listening, ensured contract for safety, behavior monitoring and 
intervention as needed; reality orientation, provided distraction, redirection, 
encouragement, positive reinforcement, and Q15 minute safety checks.



Response: Pt resting in bed at start of shift. Pt denies wanting to hurt self and others. Pt 
denies AH/VH. When asked how he is doing pt states Im fine, a little stressed about court 
when it comes. Pt reports pain to left ankle 4/10 PRN Motrin administered with HS meds. BP 
90/41 non-admin prazosin per order. Pt states he ate all his dinner and will not be having 
snack tonight. Pt slept through the night.



Plan: Crisis interruption and stabilization in a safe and therapeutic environment. Pt is on 
a TCON. Pt is awaiting for his county to arrange a court date.

## 2022-12-03 NOTE — NUR
Nursing Progress Note:      



Problem: Per 5150 pt. is here for SA by OD on street drugs after command hallucinations 
telling him to do so. Pt. states that he took "Fentanyl, black china, meth, 8 ball" in 
suicide attempt. Pt. states he feels hopeless. Pt. is also delusional, stating, "they won't 
give me my billion dollars". Pt. states, "I tried killing myself because Im a free renetta. 
They dont love me but I love them. Yes I do."



Interventions: 1:1 assessment, medication administration/education/monitoring, therapeutic 
conversation, active listening, ensured contract for safety, behavior monitoring and 
intervention as needed; reality orientation, provided distraction, redirection, 
encouragement, positive reinforcement, and Q15 minute safety checks.



Response: Patient was up early this morning for breakfast. He was compliant with 
medications. Patient is eating less food because he is obsessed with losing weight. Sat down 
with patient this morning and we talked about his learned behaviors, like solving all 
problems with fighting. He was willing to talk about it so he can try to learn new 
behaviors. Since we talked, he has been on his best behavior today, and will try to learn 
how to walk away when he feels disrespected. He has not had any problems with his roommate, 
or anybody else today. Patient has a court date on the 15th of this month, so that is 
something positive for him to think about. He has spent most of the day pacing with 
headphones on, and he continues work on getting down to his weight goal of 210 pounds.



Plan: Crisis interruption and stabilization in a safe and therapeutic environment. Pt is on 
a TCON. Pt is awaiting for his county to arrange a court date.

## 2022-12-04 VITALS — SYSTOLIC BLOOD PRESSURE: 96 MMHG | DIASTOLIC BLOOD PRESSURE: 54 MMHG

## 2022-12-04 VITALS — DIASTOLIC BLOOD PRESSURE: 51 MMHG | SYSTOLIC BLOOD PRESSURE: 94 MMHG

## 2022-12-04 RX ADMIN — THERA TABS SCH EACH: TAB at 07:45

## 2022-12-04 RX ADMIN — BUPRENORPHINE AND NALOXONE SCH TAB: 2; .5 TABLET SUBLINGUAL at 07:45

## 2022-12-04 RX ADMIN — NICOTINE SCH PATCH: 21 PATCH, EXTENDED RELEASE TRANSDERMAL at 07:44

## 2022-12-04 RX ADMIN — DIVALPROEX SODIUM SCH MG: 250 TABLET, DELAYED RELEASE ORAL at 07:45

## 2022-12-04 RX ADMIN — DOCUSATE SODIUM SCH MG: 100 CAPSULE, LIQUID FILLED ORAL at 07:44

## 2022-12-04 RX ADMIN — MAGNESIUM HYDROXIDE PRN ML: 400 SUSPENSION ORAL at 09:40

## 2022-12-04 RX ADMIN — IBUPROFEN PRN MG: 400 TABLET, FILM COATED ORAL at 11:14

## 2022-12-04 RX ADMIN — DIVALPROEX SODIUM SCH MG: 250 TABLET, DELAYED RELEASE ORAL at 20:15

## 2022-12-04 RX ADMIN — BENZTROPINE MESYLATE SCH MG: 1 TABLET ORAL at 20:15

## 2022-12-04 RX ADMIN — BENZTROPINE MESYLATE SCH MG: 1 TABLET ORAL at 07:44

## 2022-12-04 NOTE — NUR
Nursing Progress Note      



Problem: Per 5150 pt. is here for SA by OD on street drugs after command hallucinations 
telling him to do so. Pt. states that he took "Fentanyl, black china, meth, 8 ball" in 
suicide attempt. Pt. states he feels hopeless. Pt. is also delusional, stating, "they won't 
give me my billion dollars". Pt. states, "I tried killing myself because Im a free renetta. 
They dont love me but I love them. Yes I do."



Interventions: 1:1 assessment, medication administration/education/monitoring, therapeutic 
conversation, active listening, ensured contract for safety, behavior monitoring and 
intervention as needed; reality orientation, provided distraction, redirection, 
encouragement, positive reinforcement, and Q15 minute safety checks.



Response: Received patient who was awake before 0700 and was ambulating in the hallway and 
was drinking coffee before breakfast.  Patient received his 0800 medications and went to eat 
breakfast in the Community Room.  Patient reported he has had no BMs in the past 3 days.  
Patient received MOM early this morning and will ask patient at the end of the day if he had 
results from receiving MOM.  Patient kept ambulating in the hallway.  Denies voices at this 
time and reports he is not suicidal.  Patient started ambulating then stopped by the 
[facility]ing Room for Licensed Nurses and said You guys dont like me anymore.  What did I do 
to make you mad?   Patient walked up to [facility] and said You arent paying any 
attention to me.  Spoke directly with patient at 1000 and informed patient that none of us 
are upset or mad at him.  We have just had a very busy morning and asked the patient to just 
relax and he was doing great.  Patient smiled and reported Then Im having a lot of anxiety 
I dont need.  Jin Diallo RN medicated the patient with Haldol & Klonopin at 1020, and 
patient laid down to sleep until lunch at 1300.  Patient woke up and ate lunch in the 
Community Room and reported feeling better.  Patient watched some football then was located 
in laying down in his bed and fell asleep again at approximately 1400.  Patient woke up and 
came out into the hallway at 1555 and received his Ativan & Haldol.  Patient reported to me 
at this time that he threw up earlier today and I think it is the Suboxone.  Informed the 
patient that I will notify Dr. Perla in the morning about the vomiting episode and inquire 
about the medication.  



Plan: Crisis interruption and stabilization in a safe and therapeutic environment. Pt is on 
a TCON. Pt is awaiting for his county to arrange a court date.

## 2022-12-04 NOTE — NUR
Pt. became agitated regarding his desire to discharge and began rapidly pacing the unit 
making agitated statements to staff. This writer walked with pt. and attempted to provided 
redirection and positive encouragement, however pt. remained agitated. PRN Clonazepam and 
Haldol were administered and pt. reported contentment, will continue to monitor closely.

## 2022-12-04 NOTE — NUR
Nursing Progress Note:      



Problem: Per 5150 pt. is here for SA by OD on street drugs after command hallucinations 
telling him to do so. Pt. states that he took "Fentanyl, black china, meth, 8 ball" in 
suicide attempt. Pt. states he feels hopeless. Pt. is also delusional, stating, "they won't 
give me my billion dollars". Pt. states, "I tried killing myself because Im a free renetta. 
They dont love me but I love them. Yes I do."



Interventions: 1:1 assessment, medication administration/education/monitoring, therapeutic 
conversation, active listening, ensured contract for safety, behavior monitoring and 
intervention as needed; reality orientation, provided distraction, redirection, 
encouragement, positive reinforcement, and Q15 minute safety checks.



Response: Pt pacing the halls at start of shift talking to staff being pleasant and 
listening to music. When asked how he is doing today he states happy, dinner was good pt 
denies SI/HI/AH/VH. Pt states his left ankle is sore from walking on it all day. PRN Tylenol 
administered for pain 5/10 with effectiveness. Pt had snack and took all his HS meds. Paced 
the halls a few more times then went to bed. Pt slept well through the night.



Plan: Crisis interruption and stabilization in a safe and therapeutic environment. Pt is on 
a TCON. Pt is awaiting for his county to arrange a court date.

## 2022-12-05 VITALS — SYSTOLIC BLOOD PRESSURE: 101 MMHG | DIASTOLIC BLOOD PRESSURE: 52 MMHG

## 2022-12-05 VITALS — SYSTOLIC BLOOD PRESSURE: 97 MMHG | DIASTOLIC BLOOD PRESSURE: 55 MMHG

## 2022-12-05 RX ADMIN — NICOTINE SCH PATCH: 21 PATCH, EXTENDED RELEASE TRANSDERMAL at 08:02

## 2022-12-05 RX ADMIN — BUPRENORPHINE AND NALOXONE SCH TAB: 2; .5 TABLET SUBLINGUAL at 08:02

## 2022-12-05 RX ADMIN — DIVALPROEX SODIUM SCH MG: 250 TABLET, DELAYED RELEASE ORAL at 20:46

## 2022-12-05 RX ADMIN — BENZTROPINE MESYLATE SCH MG: 1 TABLET ORAL at 20:45

## 2022-12-05 RX ADMIN — THERA TABS SCH EACH: TAB at 08:04

## 2022-12-05 RX ADMIN — DOCUSATE SODIUM SCH MG: 100 CAPSULE, LIQUID FILLED ORAL at 08:03

## 2022-12-05 RX ADMIN — BENZTROPINE MESYLATE SCH MG: 1 TABLET ORAL at 08:03

## 2022-12-05 RX ADMIN — DIVALPROEX SODIUM SCH MG: 250 TABLET, DELAYED RELEASE ORAL at 08:03

## 2022-12-05 NOTE — NUR
Nursing Progress Note      



Problem: Per 5150 pt. is here for SA by OD on street drugs after command hallucinations 
telling him to do so. Pt. states that he took "Fentanyl, black china, meth, 8 ball" in 
suicide attempt. Pt. states he feels hopeless. Pt. is also delusional, stating, "they won't 
give me my billion dollars". Pt. states, "I tried killing myself because Im a free renetta. 
They dont love me but I love them. Yes I do."



Interventions: 1:1 assessment, medication administration/education/monitoring, therapeutic 
conversation, active listening, ensured contract for safety, behavior monitoring and 
intervention as needed; reality orientation, provided distraction, redirection, 
encouragement, positive reinforcement, and Q15 minute safety checks.



Response: Received patient when he woke up at 0800 after the breakfast trays had arrived on 
the unit.  Patient went immediately to the Community Room and received his morning 
medications at that time.  Patient reports I am really tired today.  Patient slept 10.75 
hours last night.  Patient returned to bed after breakfast and slept until 1300 when he got 
out of bed and returned to the Community Room.  Patient made one comment stating What if I 
did something here and had to go to skilled nursing, would they return me here or could I leave on my 
own from there.  Informed the patient that this was not an appropriate analogy to think 
about and refused to answer him as he was speaking while he was still lethargic.  Patient 
slept again this afternoon until approximately 1530 and woke up and took his 1500 
medications at that time.  



Plan: Crisis interruption and stabilization in a safe and therapeutic environment. Pt is on 
a TCON. Pt is awaiting for his county to arrange a court date.

## 2022-12-05 NOTE — NUR
Sent updated notes to [facility] for placement purposes. (11/28-12/4).



SIDDHARTHA Salamanca

## 2022-12-05 NOTE — NUR
Nursing Progress Note:      



Problem: Per 5150 pt. is here for SA by OD on street drugs after command hallucinations 
telling him to do so. Pt. states that he took "Fentanyl, black china, meth, 8 ball" in 
suicide attempt. Pt. states he feels hopeless. Pt. is also delusional, stating, "they won't 
give me my billion dollars". Pt. states, "I tried killing myself because Im a free renetta. 
They dont love me but I love them. Yes I do."



Interventions: 1:1 assessment, medication administration/education/monitoring, therapeutic 
conversation, active listening, ensured contract for safety, behavior monitoring and 
intervention as needed; reality orientation, provided distraction, redirection, 
encouragement, positive reinforcement, and Q15 minute safety checks.



Response:  

Pt sleeping at start of shift.  Declined to go for snack when awakened.  Did not answer 
questions took medications and went back to sleep is still sleeping at this time. 



Plan: Crisis interruption and stabilization in a safe and therapeutic environment. Pt is on 
a TCON. Pt is awaiting for his county to arrange a court date.

## 2022-12-06 VITALS — DIASTOLIC BLOOD PRESSURE: 43 MMHG | SYSTOLIC BLOOD PRESSURE: 121 MMHG

## 2022-12-06 VITALS — DIASTOLIC BLOOD PRESSURE: 59 MMHG | SYSTOLIC BLOOD PRESSURE: 95 MMHG

## 2022-12-06 RX ADMIN — DOCUSATE SODIUM SCH MG: 100 CAPSULE, LIQUID FILLED ORAL at 08:04

## 2022-12-06 RX ADMIN — MAGNESIUM HYDROXIDE PRN ML: 400 SUSPENSION ORAL at 12:17

## 2022-12-06 RX ADMIN — DIVALPROEX SODIUM SCH MG: 250 TABLET, DELAYED RELEASE ORAL at 08:04

## 2022-12-06 RX ADMIN — NICOTINE SCH PATCH: 21 PATCH, EXTENDED RELEASE TRANSDERMAL at 08:05

## 2022-12-06 RX ADMIN — BENZTROPINE MESYLATE SCH MG: 1 TABLET ORAL at 08:04

## 2022-12-06 RX ADMIN — DIVALPROEX SODIUM SCH MG: 250 TABLET, DELAYED RELEASE ORAL at 20:58

## 2022-12-06 RX ADMIN — BENZTROPINE MESYLATE SCH MG: 1 TABLET ORAL at 20:58

## 2022-12-06 RX ADMIN — BUPRENORPHINE AND NALOXONE SCH TAB: 2; .5 TABLET SUBLINGUAL at 08:04

## 2022-12-06 RX ADMIN — DOCUSATE SODIUM SCH MG: 100 CAPSULE, LIQUID FILLED ORAL at 20:58

## 2022-12-06 RX ADMIN — THERA TABS SCH EACH: TAB at 08:04

## 2022-12-06 NOTE — NUR
Nursing Progress Note: Mike



Problem: 

Per 5150 pt. is here for SA by OD on street drugs after command hallucinations telling him 
to do so. Pt. states that he took "Fentanyl, black china, meth, 8 ball" in suicide attempt.

Interventions: 

Maintained a safe and supportive environment, ensured contract for safety, provided clear 
and simple instructions, attempted to orient to reality, monitored behavior and provided 
intervention as needed, provided active listening and positive encouragement, encouraged 
independent performance of ADLs, and maintained Q 15min safety checks. 



Response: 

Received pt. asleep in his room and awoke to eat breakfast. Pt. denies SI, HI, A/VH and took 
his medications without hesitation. Pt. reports Im trying to avoid a problem with this 
Devil worshiper, and has avoided any confrontations with male cohort today.  Pt. reports its 
been 4 days since LBM; PRN MOM given along with prune juice with result pending. He ate all 
meals in the dining room with others but was not as social today. Pt. paces the unit and 
listens to headphones, and [facility] 
[facility]. He approached this writer with complaints of my conservator wont call me back, I 
need some new clothes, Im tired of wearing the same clothes for months. He appeared agitated 
and requested Klonopin. Pt. has good hygiene, wearing street clothes, and is well groomed. 

Plan:

Per provider, pt. continues to require a safe and supportive environment. Conservatorship is 
recommended.

## 2022-12-06 NOTE — NUR
F/u 12/6:: Pt continues eating well on Vegetarian diet, documented with

mostly 100% PO intake w/ occasional refusal while receiving double protein 

TID. Receiving routine MVI. LBM 12/1 receiving routine colace per EMR. Will continue to 
follow.

Recommendations:

1) Continue Vegetarian diet per pt request

2) Double protein TID

3) MVI supplementation per MD

4) Bowel care PRN

5) Weekly scaled weights

-------------------------------------------------------------------------------

Addendum: 12/06/22 at 0846 by Jordan Godfrey RD

-------------------------------------------------------------------------------

Amended: Links added.

## 2022-12-06 NOTE — NUR
Nursing Progress Note      



Problem: Per 5150 pt. is here for SA by OD on street drugs after command hallucinations 
telling him to do so. Pt. states that he took "Fentanyl, black china, meth, 8 ball" in 
suicide attempt. Pt. states he feels hopeless. Pt. is also delusional, stating, "they won't 
give me my billion dollars". Pt. states, "I tried killing myself because Im a free renetta. 
They dont love me but I love them. Yes I do."



Interventions: 1:1 assessment, medication administration/education/monitoring, therapeutic 
conversation, active listening, ensured contract for safety, behavior monitoring and 
intervention as needed; reality orientation, provided distraction, redirection, 
encouragement, positive reinforcement, and Q15 minute safety checks.



Response: 



1:1 with patient in his room. He had just taken a shower and was having his HS snack. When 
ask about depression and anxiety, he mumbled "yes to all above". He reports being worried 
about going to [facility]. We assured him he was safe here. He speaks very quietly and interjects 
God into conversation sporadically. He is med complian

  

Plan: Crisis interruption and stabilization in a safe and therapeutic environment. Pt is on 
a TCON. Pt is awaiting for his county to arrange a court date.

## 2022-12-07 VITALS — SYSTOLIC BLOOD PRESSURE: 100 MMHG | DIASTOLIC BLOOD PRESSURE: 60 MMHG

## 2022-12-07 VITALS — DIASTOLIC BLOOD PRESSURE: 98 MMHG | SYSTOLIC BLOOD PRESSURE: 122 MMHG

## 2022-12-07 RX ADMIN — BENZTROPINE MESYLATE SCH MG: 1 TABLET ORAL at 20:40

## 2022-12-07 RX ADMIN — MAGNESIUM HYDROXIDE PRN ML: 400 SUSPENSION ORAL at 13:09

## 2022-12-07 RX ADMIN — THERA TABS SCH EACH: TAB at 07:59

## 2022-12-07 RX ADMIN — NICOTINE POLACRILEX PRN MG: 2 LOZENGE ORAL at 13:24

## 2022-12-07 RX ADMIN — Medication SCH PACKET: at 20:39

## 2022-12-07 RX ADMIN — SODIUM CHLORIDE PRN GM: 0.9 IRRIGANT IRRIGATION at 20:40

## 2022-12-07 RX ADMIN — BUPRENORPHINE AND NALOXONE SCH TAB: 2; .5 TABLET SUBLINGUAL at 07:59

## 2022-12-07 RX ADMIN — DOCUSATE SODIUM SCH MG: 100 CAPSULE, LIQUID FILLED ORAL at 07:58

## 2022-12-07 RX ADMIN — DIVALPROEX SODIUM SCH MG: 250 TABLET, DELAYED RELEASE ORAL at 20:40

## 2022-12-07 RX ADMIN — DOCUSATE SODIUM SCH MG: 100 CAPSULE, LIQUID FILLED ORAL at 20:40

## 2022-12-07 RX ADMIN — DIVALPROEX SODIUM SCH MG: 250 TABLET, DELAYED RELEASE ORAL at 07:58

## 2022-12-07 RX ADMIN — NICOTINE SCH PATCH: 21 PATCH, EXTENDED RELEASE TRANSDERMAL at 08:02

## 2022-12-07 RX ADMIN — SODIUM CHLORIDE PRN GM: 0.9 IRRIGANT IRRIGATION at 14:39

## 2022-12-07 RX ADMIN — BENZTROPINE MESYLATE SCH MG: 1 TABLET ORAL at 07:59

## 2022-12-07 NOTE — NUR
Nursing Progress Note: Mike



Problem: 

Per 5150 pt. is here for SA by OD on street drugs after command hallucinations telling him 
to do so. Pt. states that he took "Fentanyl, black china, meth, 8 ball" in suicide attempt.

Interventions: 

Maintained a safe and supportive environment, ensured contract for safety, provided clear 
and simple instructions, attempted to orient to reality, monitored behavior and provided 
intervention as needed, provided active listening and positive encouragement, encouraged 
independent performance of ADLs, and maintained Q 15min safety checks. 



Response: 

Received pt. asleep and awoke for his medication he denies SI, HI, AH, VH and perseverates 
on his bowels. Currently pt. reports LBM was 5 days ago, and received MOM without 
effectiveness. N.O received for DSS to increase to 200mg Qday, additional MOM given today. 
Later in the day her reports he was on the toilet straining and had a small smear of blood. 
Hospitalist was notified, and N.O for lactulose Q 6hr PRN and daily mirlax to start. Pt. has 
been educated about avoiding straining and encouraged to wait on lactulose results. Pt. ate 
his meals in the dining room with dior, but did self-report Im getting fucking pissed, 
with this fool Pt. was advised to return to his room and utilize his coping skills and PRN 
Klonopin was given. This writer and staff has not witnessed any altercation or communication 
between pt. and male deondre. Pt. later reported Tylenol for ankle pain, and a nicotine 
lozenge was given. At 1600 pt. approached staff and reported im about to have a panic 
attack PRN Haldol given. He has good hygiene, well-groomed and wears street clothes. 

PRN :Nicotine lozenges, MOM, lactulose, Tylenol, Haldol, Klonopin 

Plan:

Per provider, pt. continues to require a safe and supportive environment. Conservatorship is 
recommended.

## 2022-12-07 NOTE — NUR
Nursing Progress Note      



Problem: Per 5150 pt. is here for SA by OD on street drugs after command hallucinations 
telling him to do so. Pt. states that he took "Fentanyl, black china, meth, 8 ball" in 
suicide attempt. Pt. states he feels hopeless. Pt. is also delusional, stating, "they won't 
give me my billion dollars". Pt. states, "I tried killing myself because Im a free renetta. 
They dont love me but I love them. Yes I do."



Interventions: 1:1 assessment, medication administration/education/monitoring, therapeutic 
conversation, active listening, ensured contract for safety, behavior monitoring and 
intervention as needed; reality orientation, provided distraction, redirection, 
encouragement, positive reinforcement, and Q15 minute safety checks.



Response: Patient is pleasant and cooperative with care; compliant with medication. Nicotine 
patch removed. Denies SI, HI, A/VH; no apparent delusions expressed this shift. Patient 
remained in his room throughout shift; observed sleeping and does not appear to be having 
difficulty. 



Plan: Crisis interruption and stabilization in a safe and therapeutic environment. Pt is on 
a TCON. Pt is awaiting for his county to arrange a court date.

## 2022-12-08 VITALS — DIASTOLIC BLOOD PRESSURE: 68 MMHG | SYSTOLIC BLOOD PRESSURE: 111 MMHG

## 2022-12-08 VITALS — DIASTOLIC BLOOD PRESSURE: 56 MMHG | SYSTOLIC BLOOD PRESSURE: 109 MMHG

## 2022-12-08 RX ADMIN — Medication SCH PACKET: at 20:32

## 2022-12-08 RX ADMIN — SODIUM CHLORIDE PRN GM: 0.9 IRRIGANT IRRIGATION at 14:13

## 2022-12-08 RX ADMIN — DOCUSATE SODIUM SCH MG: 100 CAPSULE, LIQUID FILLED ORAL at 08:24

## 2022-12-08 RX ADMIN — BENZTROPINE MESYLATE SCH MG: 1 TABLET ORAL at 08:25

## 2022-12-08 RX ADMIN — DIVALPROEX SODIUM SCH MG: 250 TABLET, DELAYED RELEASE ORAL at 08:24

## 2022-12-08 RX ADMIN — DOCUSATE SODIUM SCH MG: 100 CAPSULE, LIQUID FILLED ORAL at 20:35

## 2022-12-08 RX ADMIN — BUPRENORPHINE AND NALOXONE SCH TAB: 2; .5 TABLET SUBLINGUAL at 08:24

## 2022-12-08 RX ADMIN — POLYETHYLENE GLYCOL 3350 SCH GM: 17 POWDER, FOR SOLUTION ORAL at 20:32

## 2022-12-08 RX ADMIN — THERA TABS SCH EACH: TAB at 08:24

## 2022-12-08 RX ADMIN — NICOTINE SCH PATCH: 21 PATCH, EXTENDED RELEASE TRANSDERMAL at 08:26

## 2022-12-08 RX ADMIN — DIVALPROEX SODIUM SCH MG: 250 TABLET, DELAYED RELEASE ORAL at 20:37

## 2022-12-08 RX ADMIN — BENZTROPINE MESYLATE SCH MG: 1 TABLET ORAL at 20:36

## 2022-12-08 RX ADMIN — SODIUM CHLORIDE PRN GM: 0.9 IRRIGANT IRRIGATION at 08:23

## 2022-12-08 NOTE — NUR
Nursing Progress Note      



Problem: Per 5150 pt. is here for SA by OD on street drugs after command hallucinations 
telling him to do so. Pt. states that he took "Fentanyl, black china, meth, 8 ball" in 
suicide attempt. Pt. states he feels hopeless. Pt. is also delusional, stating, "they won't 
give me my billion dollars". Pt. states, "I tried killing myself because Im a free renetta. 
They dont love me but I love them. Yes I do."



Interventions: 1:1 assessment, medication administration/education/monitoring, therapeutic 
conversation, active listening, ensured contract for safety, behavior monitoring and 
intervention as needed; reality orientation, provided distraction, redirection, 
encouragement, positive reinforcement, and Q15 minute safety checks.



Response: Patient is pleasant and cooperative with care; compliant with medication. PRN 
Lactulose provided; no BM reported at this time. Patient reported he removed his Nicotine 
patch. He mostly stayed to his room this shift; briefly went to the community room. No 
negative behaviors or outbursts presented. Encouraged to drink more fluids. He's observed 
sleeping and does not appear to be having difficulty. 



Plan: Crisis interruption and stabilization in a safe and therapeutic environment. Pt is on 
a TCON. Pt is awaiting for his county to arrange a court date.

## 2022-12-08 NOTE — NUR
Nursing Progress Note: Mike



Problem: 

Per 5150 pt. is here for SA by OD on street drugs after command hallucinations telling him 
to do so. Pt. states that he took "Fentanyl, black china, meth, 8 ball" in suicide attempt.

Interventions: 

Maintained a safe and supportive environment, ensured contract for safety, provided clear 
and simple instructions, attempted to orient to reality, monitored behavior and provided 
intervention as needed, provided active listening and positive encouragement, encouraged 
independent performance of ADLs, and maintained Q 15min safety checks. 



Response: 

Received pt. asleep and awoke for his medication he denies SI, HI, AH, VH and he reports 
feeling tired and wanting to sleep in. He continues to suffer from constipation and was 
given another dose of lactulose. Pt. denies having any hemorrhoids, and was educated on not 
straining. Spoke to ordering MD and he recommends giving MOM along with lactulose today. Pt. 
bowel sounds are present but sluggish, no abd. pain reported, also reviewed pt. recent 
intake and found a diet change 1 week ago to a Vegan menu.  Pt. spent remainder of shift out 
on the unit walking and also increased fluids, no behavioral issues this shift. 

PRN :Nicotine lozenges, MOM, lactulose X2 

Plan:

Per provider, pt. continues to require a safe and supportive environment. Conservatorship is 
recommended.


-------------------------------------------------------------------------------

Addendum: 12/08/22 at 1653 by Gisele Matt RN

-------------------------------------------------------------------------------

Pt. reports lactulose and MOM effective with 2 LG BM this evening

## 2022-12-09 VITALS — SYSTOLIC BLOOD PRESSURE: 123 MMHG | DIASTOLIC BLOOD PRESSURE: 66 MMHG

## 2022-12-09 VITALS — DIASTOLIC BLOOD PRESSURE: 66 MMHG | SYSTOLIC BLOOD PRESSURE: 116 MMHG

## 2022-12-09 RX ADMIN — POLYETHYLENE GLYCOL 3350 SCH GM: 17 POWDER, FOR SOLUTION ORAL at 20:54

## 2022-12-09 RX ADMIN — Medication SCH PACKET: at 20:54

## 2022-12-09 RX ADMIN — THERA TABS SCH EACH: TAB at 07:43

## 2022-12-09 RX ADMIN — BENZTROPINE MESYLATE SCH MG: 1 TABLET ORAL at 07:42

## 2022-12-09 RX ADMIN — NICOTINE SCH PATCH: 21 PATCH, EXTENDED RELEASE TRANSDERMAL at 08:01

## 2022-12-09 RX ADMIN — DIVALPROEX SODIUM SCH MG: 250 TABLET, DELAYED RELEASE ORAL at 20:41

## 2022-12-09 RX ADMIN — BUPRENORPHINE AND NALOXONE SCH TAB: 2; .5 TABLET SUBLINGUAL at 07:43

## 2022-12-09 RX ADMIN — BENZTROPINE MESYLATE SCH MG: 1 TABLET ORAL at 20:42

## 2022-12-09 RX ADMIN — BENZOCAINE PRN APPLIC: 200 SOLUTION TOPICAL at 15:20

## 2022-12-09 RX ADMIN — DIVALPROEX SODIUM SCH MG: 250 TABLET, DELAYED RELEASE ORAL at 07:42

## 2022-12-09 RX ADMIN — DOCUSATE SODIUM SCH MG: 100 CAPSULE, LIQUID FILLED ORAL at 20:43

## 2022-12-09 RX ADMIN — DOCUSATE SODIUM SCH MG: 100 CAPSULE, LIQUID FILLED ORAL at 07:42

## 2022-12-09 NOTE — NUR
Nursing Progress Note      



Problem: Per 5150 pt. is here for SA by OD on street drugs after command hallucinations 
telling him to do so. Pt. states that he took "Fentanyl, black china, meth, 8 ball" in 
suicide attempt. Pt. states he feels hopeless. Pt. is also delusional, stating, "they won't 
give me my billion dollars". Pt. states, "I tried killing myself because Im a free renetta. 
They dont love me but I love them. Yes I do."



Interventions: 1:1 assessment, therapeutic conversation, active listening, ensured contract 
for safety, medication administration/education/monitoring, encouraged oral hygiene,behavior 
monitoring and intervention as needed; reality orientation, distraction, redirection, limit 
setting, encouragement, positive reinforcement, and Q15 minute safety checks.



Response:  Pt was up late for breakfast though did eat and was cooperative with medications. 
Pt c/o 7/10 right arm pain and was given Tylenol 650 mg with good effect. Pt returned to bed 
and napped until just after 1100 morning snack. While this RN was at lunch, pt c/o AH "evil 
voices" to another RN and was given PRN Haldol 5 mg at 1148. Pt c/o an anxiety attack and 
asked for PRN Klonopin 2 mg at 1235 with good effect. Pt c/o leg pain 3/10 and asked for 
more Tylenol 650 mg at 1301 with good effect. Pt denied hearing evil voices to this RN, he 
stated that he had been hearing "rhyming voices" pt verbalized a few rhymes as an example. 



Pt asked someone for candy, ate some Skittles then approached this RN to c/o dental pain and 
asked for Anbesol. Asked pt if he had brushed his teeth. Discovered that pt has not been 
brushing his teeth at all because when he does, his gums bleed. Educated pt on the 
importance of routine tooth brushing twice daily with gentle, circular motions and that his 
gums bleed because he doesn't brush, with regular brushing, they should stop bleeding. 
Provided pt with a toothbrush and toothpaste. Pt brushed his teeth and reported the pain was 
gone. A short time later, pt approached this nurse again to ask for Anbesol and to report 
that his gums were throbbing. PRN Anbesol applied to left lower back molar and surrounding 
gums with good effect. No abscess or redness noted, tooth indicated has a filling.



Plan: Pt is on a TCON, pt has a hearing set for 12/15/22, he is awaiting placement.

## 2022-12-09 NOTE — NUR
Nursing Progress Note: Mike



Problem: 

Per 5150 pt. is here for SA by OD on street drugs after command hallucinations telling him 
to do so. Pt. states that he took "Fentanyl, black china, meth, 8 ball" in suicide attempt.





Interventions: 

Maintained a safe and supportive environment, ensured contract for safety, provided clear 
and simple instructions, attempted to orient to reality, monitored behavior and provided 
intervention as needed, provided active listening and positive encouragement, encouraged 
independent performance of ADLs, and maintained Q 15min safety checks. 



Response: Patient was found sleeping in bed. Patient continued to self isolate and sleep 
through out shift. Patient took all night medications without issue including scheduled 
miralax due to patient going multiple days without BM. Patient continues to try and have a 
bm at this time. 



Plan:

Per provider, pt. continues to require a safe and supportive environment. Conservatorship is 
recommended.

## 2022-12-10 VITALS — DIASTOLIC BLOOD PRESSURE: 54 MMHG | SYSTOLIC BLOOD PRESSURE: 96 MMHG

## 2022-12-10 VITALS — SYSTOLIC BLOOD PRESSURE: 102 MMHG | DIASTOLIC BLOOD PRESSURE: 58 MMHG

## 2022-12-10 RX ADMIN — BUPRENORPHINE AND NALOXONE SCH TAB: 2; .5 TABLET SUBLINGUAL at 08:19

## 2022-12-10 RX ADMIN — DIVALPROEX SODIUM SCH MG: 250 TABLET, DELAYED RELEASE ORAL at 20:47

## 2022-12-10 RX ADMIN — NICOTINE SCH PATCH: 21 PATCH, EXTENDED RELEASE TRANSDERMAL at 08:20

## 2022-12-10 RX ADMIN — NICOTINE POLACRILEX PRN MG: 2 LOZENGE ORAL at 09:32

## 2022-12-10 RX ADMIN — DOCUSATE SODIUM SCH MG: 100 CAPSULE, LIQUID FILLED ORAL at 08:19

## 2022-12-10 RX ADMIN — Medication SCH PACKET: at 20:50

## 2022-12-10 RX ADMIN — NICOTINE POLACRILEX PRN MG: 2 LOZENGE ORAL at 13:51

## 2022-12-10 RX ADMIN — BENZTROPINE MESYLATE SCH MG: 1 TABLET ORAL at 08:19

## 2022-12-10 RX ADMIN — DOCUSATE SODIUM SCH MG: 100 CAPSULE, LIQUID FILLED ORAL at 20:47

## 2022-12-10 RX ADMIN — BENZTROPINE MESYLATE SCH MG: 1 TABLET ORAL at 20:48

## 2022-12-10 RX ADMIN — POLYETHYLENE GLYCOL 3350 SCH GM: 17 POWDER, FOR SOLUTION ORAL at 20:50

## 2022-12-10 RX ADMIN — THERA TABS SCH EACH: TAB at 08:19

## 2022-12-10 RX ADMIN — DIVALPROEX SODIUM SCH MG: 250 TABLET, DELAYED RELEASE ORAL at 08:19

## 2022-12-10 RX ADMIN — SODIUM CHLORIDE PRN GM: 0.9 IRRIGANT IRRIGATION at 10:55

## 2022-12-10 NOTE — NUR
Nursing Progress Note      



Problem: Per 5150 pt. is here for SA by OD on street drugs after command hallucinations 
telling him to do so. Pt. states that he took "Fentanyl, black china, meth, 8 ball" in 
suicide attempt. Pt. states he feels hopeless. Pt. is also delusional, stating, "they won't 
give me my billion dollars". Pt. states, "I tried killing myself because Im a free renetta. 
They dont love me but I love them. Yes I do."



Interventions: 1:1 assessment, therapeutic conversation, active listening, ensured contract 
for safety, medication administration/education/monitoring, encouraged oral hygiene,behavior 
monitoring and intervention as needed; reality orientation, distraction, redirection, limit 
setting, encouragement, positive reinforcement, and Q15 minute safety checks.



Response:  Patient was found sleeping at beginning of shift. Patient stated that he had a 
med BM earlier in the day and again at beginning of shift. Patient isolated in room sleeping 
through out shift.Patient took all medications without issue and refused to participate in 
snack. Patient tried again to have a BM before going back to bed. 





Plan: Pt is on a TCON, pt has a hearing set for 12/15/22, he is awaiting placement.

## 2022-12-10 NOTE — NUR
Nursing Progress Note: 



Problem:  Per 5150 pt. is here for SA by OD on street drugs after command hallucinations 
telling him to do so. Pt. states that he took "Fentanyl, black china, meth, 8 ball" in 
suicide attempt.

Interventions: Maintained a safe and supportive environment, ensured contract for safety, 
provided clear and simple instructions, attempted to orient to reality, monitored behavior 
and provided intervention as needed, provided active listening and positive encouragement, 
encouraged independent performance of ADLs, and maintained Q 15min safety checks. 



Response:  Patient was asleep at shift change, but was up for breakfast and morning med 
pass. He eats little food, because I have to lose weight.  Patient perseverates on losing 
weight, his bowels, what other people are sayingon and on. He has nothing good to think 
about, so he thinks about bad stuff. Patient spends the day pacing with and without 
headphones on. He is advised to let his legs and ankle rest, but he doesnt listen, I have 
to keep walking to lose weight. Patient had a good morning, but in afternoon his anger 
boils over, and he explodes. Patient cant state a good reason why. Order received from Dr. Davalos for oral B52: 2mg Ativan PO, 50mg Benadryl PO, and Haldol 5mg PO. Patient accepted 
medication and apologizes for scaring people. He then went and laid down for a while.



Plan:  Per provider, pt. continues to require a safe and supportive environment. 
Conservatorship is recommended.

## 2022-12-11 VITALS — DIASTOLIC BLOOD PRESSURE: 81 MMHG | SYSTOLIC BLOOD PRESSURE: 127 MMHG

## 2022-12-11 VITALS — DIASTOLIC BLOOD PRESSURE: 69 MMHG | SYSTOLIC BLOOD PRESSURE: 110 MMHG

## 2022-12-11 RX ADMIN — IBUPROFEN PRN MG: 400 TABLET, FILM COATED ORAL at 13:47

## 2022-12-11 RX ADMIN — NICOTINE SCH PATCH: 21 PATCH, EXTENDED RELEASE TRANSDERMAL at 07:55

## 2022-12-11 RX ADMIN — BUPRENORPHINE AND NALOXONE SCH TAB: 2; .5 TABLET SUBLINGUAL at 07:55

## 2022-12-11 RX ADMIN — BENZTROPINE MESYLATE SCH MG: 1 TABLET ORAL at 20:20

## 2022-12-11 RX ADMIN — THERA TABS SCH EACH: TAB at 07:55

## 2022-12-11 RX ADMIN — DOCUSATE SODIUM SCH MG: 100 CAPSULE, LIQUID FILLED ORAL at 20:21

## 2022-12-11 RX ADMIN — BUPRENORPHINE AND NALOXONE SCH TAB: 2; .5 TABLET SUBLINGUAL at 15:22

## 2022-12-11 RX ADMIN — BENZOCAINE PRN APPLIC: 200 SOLUTION TOPICAL at 14:18

## 2022-12-11 RX ADMIN — BENZOCAINE PRN APPLIC: 200 SOLUTION TOPICAL at 17:12

## 2022-12-11 RX ADMIN — POLYETHYLENE GLYCOL 3350 SCH GM: 17 POWDER, FOR SOLUTION ORAL at 20:25

## 2022-12-11 RX ADMIN — DOCUSATE SODIUM SCH MG: 100 CAPSULE, LIQUID FILLED ORAL at 07:54

## 2022-12-11 RX ADMIN — DIVALPROEX SODIUM SCH MG: 250 TABLET, DELAYED RELEASE ORAL at 20:20

## 2022-12-11 RX ADMIN — DIVALPROEX SODIUM SCH MG: 250 TABLET, DELAYED RELEASE ORAL at 07:54

## 2022-12-11 RX ADMIN — BENZTROPINE MESYLATE SCH MG: 1 TABLET ORAL at 07:54

## 2022-12-11 RX ADMIN — Medication SCH PACKET: at 20:25

## 2022-12-11 NOTE — NUR
Nursing Progress Note: 



Problem:  Per 5150 pt. is here for SA by OD on street drugs after command hallucinations 
telling him to do so. Pt. states that he took "Fentanyl, black china, meth, 8 ball" in 
suicide attempt.

Interventions: Maintained a safe and supportive environment, ensured contract for safety, 
provided clear and simple instructions, attempted to orient to reality, monitored behavior 
and provided intervention as needed, provided active listening and positive encouragement, 
encouraged independent performance of ADLs, and maintained Q 15min safety checks. 



Response:  Patient was found sleeping at beginning of shift. Patient continued to sleep 
except for getting up for snack. Patient took all night medications without difficulty and 
returned to sleep. Patient stated he only had small BM today. 



Plan:  Per provider, pt. continues to require a safe and supportive environment. 
Conservatorship is recommended.

## 2022-12-11 NOTE — NUR
Nursing Progress Note:



Problem:  Pt is a 70 y/o female admitted from [facility] to [facility] on a 5150 for DTS at 1540, 
escorted via wheelchair by patient care tech and security. She was placed on a 5150 for 
experiencing command auditory hallucinations telling her to hurt herself, and stating she is 
tired of living and that she just wants to die. She has also been engaging in self-injurious 
behaviors. She has a hx of Schizoaffective D/O and multiple psychiatric hospitalizations.



Interventions:  Provide medication administration & medication management; Maintained a safe 
& supportive environment; Clear & simple instructions; Direction & encouragement  regarding 
performance of ADLs; monitored behaviors & maintained clear boundaries; Patient physical 
assessment & 1:1 patient interview; Therapeutic conversation & active listening; Patient 
education & monitoring. 

Response: 





Response: Received patient who woke up at approximately 0745 this morning.  Patient was 
pleasant and cooperative throughout the day.  Patient is excited his birthday is tomorrow 
and also excited about his Court Date on 12/15/22 (Thursday).  Patient Patient ate breakfast 
in the Community Room and participated in snacks.  Patient took his 0800 medications as 
ordered by MD.  Patient c/o left ankle pain rated at 4 on a scale of 1-10 and received 
Tylenol 0756.  Patient reported at 0900 he received relief from the Tylenol.  Patient c/o 
anxiety at 0918 and was slightly agitated at the time. Patient received Klonopin at 0918 and 
laid down on his bed and rested until snack time at 1100.  Patient c/o tooth pain on the 
inside bottom left of his mouth.  Anbesol applied at 1419.  Spoke to Dr. Davalos and 
informed him of patients mouth pain.  Dr. Davalos informed this Writer to change the 
Anbesol from Q6h prn pain to Anbesol po q3h prn pain.  Dr. Davalos also informed this 
Writer at 1500 to change the patients order for Buprenorph SL at 0800 to a second dose at 
1500 SL each day.  Patient received second dose and appeared much more comfortable at 
approximately 1530.  Per Dr. Davalos, he would like me to inform the  
Silvana to contact patients guardian tomorrow to get a dental appointment to have 
patients tooth that is painful assessed.  Patient received a second dose of Tylenol at 1347 
for c/o pain with relief.  Patient ambulated up and down the hallway in between naps and 
meals. 



Plan: Plan: Crisis interruption, stabilization with medication adjustment, management and 
monitoring in a safe and therapeutic environment until stable. 



Patient has a Court Date on 12/15/22 (Thursday).

## 2022-12-12 VITALS — SYSTOLIC BLOOD PRESSURE: 126 MMHG | DIASTOLIC BLOOD PRESSURE: 79 MMHG

## 2022-12-12 VITALS — DIASTOLIC BLOOD PRESSURE: 68 MMHG | SYSTOLIC BLOOD PRESSURE: 130 MMHG

## 2022-12-12 RX ADMIN — BENZOCAINE PRN APPLIC: 200 SOLUTION TOPICAL at 09:20

## 2022-12-12 RX ADMIN — POLYETHYLENE GLYCOL 3350 SCH GM: 17 POWDER, FOR SOLUTION ORAL at 20:25

## 2022-12-12 RX ADMIN — BENZTROPINE MESYLATE SCH MG: 1 TABLET ORAL at 07:40

## 2022-12-12 RX ADMIN — BUPRENORPHINE AND NALOXONE SCH TAB: 2; .5 TABLET SUBLINGUAL at 14:31

## 2022-12-12 RX ADMIN — BUPRENORPHINE AND NALOXONE SCH TAB: 2; .5 TABLET SUBLINGUAL at 07:41

## 2022-12-12 RX ADMIN — BENZOCAINE PRN APPLIC: 200 SOLUTION TOPICAL at 19:05

## 2022-12-12 RX ADMIN — BENZTROPINE MESYLATE SCH MG: 1 TABLET ORAL at 20:23

## 2022-12-12 RX ADMIN — DOCUSATE SODIUM SCH MG: 100 CAPSULE, LIQUID FILLED ORAL at 20:24

## 2022-12-12 RX ADMIN — DOCUSATE SODIUM SCH MG: 100 CAPSULE, LIQUID FILLED ORAL at 07:40

## 2022-12-12 RX ADMIN — Medication SCH PACKET: at 20:25

## 2022-12-12 RX ADMIN — DIVALPROEX SODIUM SCH MG: 250 TABLET, DELAYED RELEASE ORAL at 20:23

## 2022-12-12 RX ADMIN — NICOTINE SCH PATCH: 21 PATCH, EXTENDED RELEASE TRANSDERMAL at 07:39

## 2022-12-12 RX ADMIN — SODIUM CHLORIDE PRN GM: 0.9 IRRIGANT IRRIGATION at 09:20

## 2022-12-12 RX ADMIN — THERA TABS SCH EACH: TAB at 07:41

## 2022-12-12 RX ADMIN — DIVALPROEX SODIUM SCH MG: 250 TABLET, DELAYED RELEASE ORAL at 07:41

## 2022-12-12 NOTE — NUR
Nursing Progress Note: 



Problem:  Per 5150 pt. is here for SA by OD on street drugs after command hallucinations 
telling him to do so. Pt. states that he took "Fentanyl, black china, meth, 8 ball" in 
suicide attempt.

Interventions: Maintained a safe and supportive environment, ensured contract for safety, 
provided clear and simple instructions, attempted to orient to reality, monitored behavior 
and provided intervention as needed, provided active listening and positive encouragement, 
encouraged independent performance of ADLs, and maintained Q 15min safety checks. 



Response:  



Pt up on unit at start of shift. C/O hearing voices he said  were "really bothering me"  Pt 
conversation is  disorganized difficult to  understand when he tried to relate what the 
voices were saying.  Given 5mg PRN Haldol.  The pt reported good results.  



He was up on unit walking in halls ate snack in group room.  Pleasant and cooperative with 
car.  Not observed having conversations with other pts.  Took all medications went to bed 
went to sleep.





Plan:  Per provider, pt. continues to require a safe and supportive environment. 
Conservatorship is recommended.

## 2022-12-12 NOTE — NUR
Nursing Progress Note:



Problem:  Per 5150 pt. is here for SA by OD on street drugs after command hallucinations 
telling him to do so. Pt. states that he took "Fentanyl, black china, meth, 8 ball" in 
suicide attempt.



Interventions:  Provide medication administration & medication management; Maintained a safe 
& supportive environment; Clear & simple instructions; Direction & encouragement  regarding 
performance of ADLs; monitored behaviors & maintained clear boundaries; Patient physical 
assessment & 1:1 patient interview; Therapeutic conversation & active listening; Patient 
education & monitoring. 



Response: Received patient who today is His 37th Birthday!!  Patient received Greetings for 
a Big Happy Birthday, by this Writer when he woke up this morning. Patient had a big smile 
on his face and readily accepted his Happy Birthday Greeting.  Patient denies SI/AH & VH 
today.  Patient states he did have a small bowel movement yesterday, but was given Lactulose 
and reports having 3 large bowel movements this morning.  Spoke with YUVAL Pina, who 
contacted the Patients Guardian to schedule a dental appointment for this patient for his 
painful tooth.  Patient reports I used to put rolled up Meth in between my cheek and my 
teeth and gums and hold it there.  Patient requested Tylenol at 0810 for c/o Tooth Pain, 
rated at a 6 on a scale of 1-10 and received good relief approximately 1 hour after 
receiving the Tylenol.  Patient received Anbesol for c/o tooth pain at 0920 and received 
some relief from Anbesol.  Patient requested Klonopin for c/o anxiety at 1000 with immediate 
relief.  Patient ambulated in the hallways and talked and laughed with peers who wished him 
a Happy Birthday throughout the daytime.  Patient received 2nd dose of Tylenol at 1530 for 
c/o left ankle pain rated at a 6.  Patient received relief from all Tylenol administered 
and he was happy throughout the day today.  



Plan: Plan: Crisis interruption, stabilization with medication adjustment, management and 
monitoring in a safe and therapeutic environment until stable. 



Patient has a Court Date on 12/15/22 (Thursday).

## 2022-12-13 VITALS — DIASTOLIC BLOOD PRESSURE: 65 MMHG | SYSTOLIC BLOOD PRESSURE: 101 MMHG

## 2022-12-13 VITALS — DIASTOLIC BLOOD PRESSURE: 56 MMHG | SYSTOLIC BLOOD PRESSURE: 102 MMHG

## 2022-12-13 RX ADMIN — NICOTINE POLACRILEX PRN MG: 2 LOZENGE ORAL at 08:34

## 2022-12-13 RX ADMIN — THERA TABS SCH EACH: TAB at 07:54

## 2022-12-13 RX ADMIN — BUPRENORPHINE AND NALOXONE SCH TAB: 2; .5 TABLET SUBLINGUAL at 07:55

## 2022-12-13 RX ADMIN — Medication SCH PACKET: at 20:09

## 2022-12-13 RX ADMIN — BENZTROPINE MESYLATE SCH MG: 1 TABLET ORAL at 20:10

## 2022-12-13 RX ADMIN — BENZTROPINE MESYLATE SCH MG: 1 TABLET ORAL at 07:54

## 2022-12-13 RX ADMIN — BUPRENORPHINE AND NALOXONE SCH TAB: 2; .5 TABLET SUBLINGUAL at 14:12

## 2022-12-13 RX ADMIN — DOCUSATE SODIUM SCH MG: 100 CAPSULE, LIQUID FILLED ORAL at 20:11

## 2022-12-13 RX ADMIN — NICOTINE SCH PATCH: 21 PATCH, EXTENDED RELEASE TRANSDERMAL at 07:53

## 2022-12-13 RX ADMIN — DIVALPROEX SODIUM SCH MG: 250 TABLET, DELAYED RELEASE ORAL at 20:11

## 2022-12-13 RX ADMIN — DIVALPROEX SODIUM SCH MG: 250 TABLET, DELAYED RELEASE ORAL at 07:54

## 2022-12-13 RX ADMIN — POLYETHYLENE GLYCOL 3350 SCH GM: 17 POWDER, FOR SOLUTION ORAL at 20:09

## 2022-12-13 RX ADMIN — DOCUSATE SODIUM SCH MG: 100 CAPSULE, LIQUID FILLED ORAL at 07:54

## 2022-12-13 NOTE — NUR
Nursing Progress Note:



Problem:  Per 5150 pt. is here for SA by OD on street drugs after command hallucinations 
telling him to do so. Pt. states that he took "Fentanyl, black china, meth, 8 ball" in 
suicide attempt.



Interventions:  Provide medication administration & medication management; Maintained a safe 
& supportive environment; Clear & simple instructions; Direction & encouragement  regarding 
performance of ADLs; monitored behaviors & maintained clear boundaries; Patient physical 
assessment & 1:1 patient interview; Therapeutic conversation & active listening; Patient 
education & monitoring. 



Response: Patient was up ambulating in the hallway at approximately 0620 this morning.  
Patient stated I had a really bad dream that was a bad nightmare where my brother killed 
someone and there was meth involved.  Patient repeated this approximately 3 times 
throughout the daytime.  Patient appeared upset and stated I am trying to forget about it. 
 Patient ambulated in the hallway much of the morning and requested headphones.  One of the 
Staff Nurses tuned the Departments Headphones into the Bluetooth on her phone and the songs 
that the Bluetooth Headphones were playing was a RAP Band called [employer]. This 
Writer did not give permission for this patient to listen to these types of songs.   The 
patient was already having difficulty with the nightmare he reported experiencing during the 
nighttime, so after lunch when he requested to again listen to this same band, and informed 
this Writer of the Name of the Band and I informed him I did not believe it was a good 
decision at that time to again listen to the same music that one hour earlier was followed 
with the patient stating I just want to die, I just want to kill myself, I dont care about 
this place anymore and Im going to punch and hurt someone really bad in here because I am 
sick of all of you.  Patient was given Haldol and Klonopin an hour earlier and had started 
to work to relax patient when he asked to listen to the rap music again.  Patient yelled 
this same information over and over up and down the hallway and Security was called.  
Patient was administered po Ativan, Haldol and Buprenorph at 1411 and then was talked to by 
Security at this time.  Patient returned to his room to rest for approximately 40 minutes, 
then came out of his room and requested to talk with this Writer.  Patient apologized for 
his behavior and asked this Writer for a hug afterwards.  Patient then returned to his room 
with a smile on his face and slept in his bed for approximately 2 hours then ambulated in 
the hallway then again fell asleep in his bed.  Patient resting at this time. 



Plan: Plan: Crisis interruption, stabilization with medication adjustment, management and 
monitoring in a safe and therapeutic environment until stable. 



Patient has a Court Date on 12/15/22 (Thursday).

## 2022-12-13 NOTE — NUR
Nursing Progress Note:



Problem:  Per 5150 pt. is here for SA by OD on street drugs after command hallucinations 
telling him to do so. Pt. states that he took "Fentanyl, black china, meth, 8 ball" in 
suicide attempt.



Interventions:  Provide medication administration & medication management; Maintained a safe 
& supportive environment; Clear & simple instructions; Direction & encouragement  regarding 
performance of ADLs; monitored behaviors & maintained clear boundaries; Patient physical 
assessment & 1:1 patient interview; Therapeutic conversation & active listening; Patient 
education & monitoring. 



Response: Pt is awake walking in the hallway at change of shift. Met with RN for 1:1 
assessment at the bedside. He is smiling, stating that today is the best birthday he has had 
in a few years. States that he had a good day today. Pt socializing and pacing the halls. 
Compliant with medications, no AEs observed or reported. Denies A/VH, S/I, H/I. 



Plan: Plan: LPS court on 12/15/22 GOAL: Pt will perform all bedmobility CGA within 3-4 wks.

## 2022-12-13 NOTE — NUR
Dietary Note:

Patient came to this Writer and asked to change his diet to rice, vegetables and other 
miscellaneous items. Informed the patient, when he was not clear what diet he would like to 
be on, that I would call Jordan the Dietitian and speak with him first about the type of 
diets that they offer at [facility]. Left a message with Jordan to return the call and a decision 
could be made on patient's behalf.  Per Dr. Perla, it would be okay to change the patient's 
diet to what the patient ends up deciding in conjunction with the Dietitian. Will wait for 
the return phone call.

## 2022-12-13 NOTE — NUR
Therapeutic group, 1:1 

This writer spoke with client, and asked him to attend group, even for a few minutes. Client 
declined, was polite, stating he was stressed about his upcoming hearing with [facility] (Dec 15), as evidenced by self-reporting, facial expression, and choice of words. 
Client expressed wanting to know where he might be placed, and when, when he will be getting 
new cloths. Client indicated he spoke to [facility] Public Guardian Amy yesterday. 
This writer used active empathic listening, encouraged client to talk about his goals.

## 2022-12-14 VITALS — SYSTOLIC BLOOD PRESSURE: 97 MMHG | DIASTOLIC BLOOD PRESSURE: 57 MMHG

## 2022-12-14 VITALS — DIASTOLIC BLOOD PRESSURE: 87 MMHG | SYSTOLIC BLOOD PRESSURE: 141 MMHG

## 2022-12-14 RX ADMIN — BENZOCAINE PRN APPLIC: 200 SOLUTION TOPICAL at 16:54

## 2022-12-14 RX ADMIN — THERA TABS SCH EACH: TAB at 08:43

## 2022-12-14 RX ADMIN — BUPRENORPHINE AND NALOXONE SCH TAB: 2; .5 TABLET SUBLINGUAL at 15:37

## 2022-12-14 RX ADMIN — MAGNESIUM HYDROXIDE PRN ML: 400 SUSPENSION ORAL at 13:38

## 2022-12-14 RX ADMIN — NICOTINE SCH PATCH: 21 PATCH, EXTENDED RELEASE TRANSDERMAL at 08:44

## 2022-12-14 RX ADMIN — DOCUSATE SODIUM SCH MG: 100 CAPSULE, LIQUID FILLED ORAL at 19:28

## 2022-12-14 RX ADMIN — BUPRENORPHINE AND NALOXONE SCH TAB: 2; .5 TABLET SUBLINGUAL at 08:43

## 2022-12-14 RX ADMIN — BENZTROPINE MESYLATE SCH MG: 1 TABLET ORAL at 08:43

## 2022-12-14 RX ADMIN — POLYETHYLENE GLYCOL 3350 SCH GM: 17 POWDER, FOR SOLUTION ORAL at 19:27

## 2022-12-14 RX ADMIN — IBUPROFEN PRN MG: 400 TABLET, FILM COATED ORAL at 12:03

## 2022-12-14 RX ADMIN — DIVALPROEX SODIUM SCH MG: 250 TABLET, DELAYED RELEASE ORAL at 19:29

## 2022-12-14 RX ADMIN — NICOTINE POLACRILEX PRN MG: 2 LOZENGE ORAL at 14:03

## 2022-12-14 RX ADMIN — NICOTINE POLACRILEX PRN MG: 2 LOZENGE ORAL at 17:24

## 2022-12-14 RX ADMIN — NICOTINE POLACRILEX PRN MG: 2 LOZENGE ORAL at 09:48

## 2022-12-14 RX ADMIN — DOCUSATE SODIUM SCH MG: 100 CAPSULE, LIQUID FILLED ORAL at 08:43

## 2022-12-14 RX ADMIN — BENZOCAINE PRN APPLIC: 200 SOLUTION TOPICAL at 11:09

## 2022-12-14 RX ADMIN — DIVALPROEX SODIUM SCH MG: 250 TABLET, DELAYED RELEASE ORAL at 08:43

## 2022-12-14 RX ADMIN — BENZTROPINE MESYLATE SCH MG: 1 TABLET ORAL at 19:28

## 2022-12-14 RX ADMIN — Medication SCH PACKET: at 19:27

## 2022-12-14 NOTE — NUR
Therapeutic group

DESCRIPTION Daily therapeutic groups support [facility] crisis-recovery environment, and meet 
medical necessity given the acuity of clients symptoms.

Topic:  psychosocial educationtriggers resulting in feelings (anxiety, fear, sadness), and 
what to do?  Activity: creating a "" with emotions, then on the inside, an 
affirmation such as "I am enough." "I am brave." or sayings clients wrote themselves. Client 
participated in group as evidenced by peer interactions, conversation with peers and staff 
regarding triggers and trigger reactions, and how to cope in a health way. Client 
preferences to remain standing.

INTERVENTION Therapeutic communication, peer support, peer validation, coping skills and 
psychosocial education.  Secondarily--intellectual and physical activity, peer, and staff 
companionship, alleviating and discouraging isolation.

## 2022-12-14 NOTE — NUR
Nursing Progress Note:



Problem:  Per 5150 pt. is here for SA by OD on street drugs after command hallucinations 
telling him to do so. Pt. states that he took "Fentanyl, black china, meth, 8 ball" in 
suicide attempt.



Interventions:  Provide medication administration & medication management; Maintained a safe 
& supportive environment; Clear & simple instructions; Direction & encouragement  regarding 
performance of ADLs; monitored behaviors & maintained clear boundaries; Patient physical 
assessment & 1:1 patient interview; Therapeutic conversation & active listening; Patient 
education & monitoring. 



Response: Patient laying in bed at shift time. Patient self isolated to room entire shift. 
Patient reports being depressed over, "circumstances." Patient made minimal conversation and 
asked to sleep. Patient brought snack at snack time. Patient took evening meds w/o 
complications.



Plan: Plan: Crisis interruption, stabilization with medication adjustment, management and 
monitoring in a safe and therapeutic environment until stable. 



Patient has a Court Date on 12/15/22 (Thursday).

## 2022-12-14 NOTE — NUR
Nursing Progress Note:



Problem:  Per 5150 pt. is here for SA by OD on street drugs after command hallucinations 
telling him to do so. Pt. states that he took "Fentanyl, black china, meth, 8 ball" in 
suicide attempt.



Interventions:  Provide medication administration & medication management; Maintained a safe 
& supportive environment; Clear & simple instructions; Direction & encouragement  regarding 
performance of ADLs; monitored behaviors & maintained clear boundaries; Patient physical 
assessment & 1:1 patient interview; Therapeutic conversation & active listening; Patient 
education & monitoring. 



Response:  Received patient sleeping in bed at change of shift.  Patient had a difficult 
time waking up and was late attending breakfast. Patient took medications, and states that 
he has bad dreams at night.  Patient complaining of left upper tooth pain, and anbisol was 
administered with effectiveness. Patient also requested Klonopin, as he is anxious about his 
court date tomorrow.  Patient paced the hallways listening to headphones for most of the 
day.  The patient states that he does hear auditory hallucinations, either demons or angels 
throughout the day. 



At approximately 16:45, patient took offense with one of the CNA's and yelled loudly at him. 
 Patient was able to be redirected without further incident. 



Plan: Plan: Crisis interruption, stabilization with medication adjustment, management and 
monitoring in a safe and therapeutic environment until stable. 



Patient has a Court Date on 12/15/22 (Thursday).

## 2022-12-15 VITALS — DIASTOLIC BLOOD PRESSURE: 90 MMHG | SYSTOLIC BLOOD PRESSURE: 137 MMHG

## 2022-12-15 VITALS — DIASTOLIC BLOOD PRESSURE: 49 MMHG | SYSTOLIC BLOOD PRESSURE: 87 MMHG

## 2022-12-15 RX ADMIN — DIVALPROEX SODIUM SCH MG: 250 TABLET, DELAYED RELEASE ORAL at 08:13

## 2022-12-15 RX ADMIN — DOCUSATE SODIUM SCH MG: 100 CAPSULE, LIQUID FILLED ORAL at 19:37

## 2022-12-15 RX ADMIN — ONDANSETRON PRN MG: 4 TABLET, ORALLY DISINTEGRATING ORAL at 20:00

## 2022-12-15 RX ADMIN — BENZTROPINE MESYLATE SCH MG: 1 TABLET ORAL at 19:39

## 2022-12-15 RX ADMIN — NICOTINE POLACRILEX PRN MG: 2 LOZENGE ORAL at 12:14

## 2022-12-15 RX ADMIN — POLYETHYLENE GLYCOL 3350 SCH GM: 17 POWDER, FOR SOLUTION ORAL at 19:36

## 2022-12-15 RX ADMIN — IBUPROFEN PRN MG: 400 TABLET, FILM COATED ORAL at 12:49

## 2022-12-15 RX ADMIN — DIVALPROEX SODIUM SCH MG: 250 TABLET, DELAYED RELEASE ORAL at 19:37

## 2022-12-15 RX ADMIN — BUPRENORPHINE AND NALOXONE SCH TAB: 2; .5 TABLET SUBLINGUAL at 08:13

## 2022-12-15 RX ADMIN — DOCUSATE SODIUM SCH MG: 100 CAPSULE, LIQUID FILLED ORAL at 08:14

## 2022-12-15 RX ADMIN — THERA TABS SCH EACH: TAB at 08:13

## 2022-12-15 RX ADMIN — Medication SCH PACKET: at 19:36

## 2022-12-15 RX ADMIN — NICOTINE SCH PATCH: 21 PATCH, EXTENDED RELEASE TRANSDERMAL at 08:14

## 2022-12-15 RX ADMIN — BUPRENORPHINE AND NALOXONE SCH TAB: 2; .5 TABLET SUBLINGUAL at 15:20

## 2022-12-15 RX ADMIN — NICOTINE POLACRILEX PRN MG: 2 LOZENGE ORAL at 17:01

## 2022-12-15 RX ADMIN — BENZTROPINE MESYLATE SCH MG: 1 TABLET ORAL at 08:13

## 2022-12-15 NOTE — NUR
Nursing Progress Note:



Problem:  Per 5150 pt. is here for SA by OD on street drugs after command hallucinations 
telling him to do so. Pt. states that he took "Fentanyl, black china, meth, 8 ball" in 
suicide attempt.



Interventions:  Provide medication administration & medication management; Maintained a safe 
& supportive environment; Clear & simple instructions; Direction & encouragement  regarding 
performance of ADLs; monitored behaviors & maintained clear boundaries; Patient physical 
assessment & 1:1 patient interview; Therapeutic conversation & active listening; Patient 
education & monitoring. 



Response: Patient visibly upset at shift change. Patient pacing the hallways and becoming 
labile at times. the patient was able to talk to staff about what was bothering him and calm 
down. Patient given 2mg Klonopin with good effect. Patient was in a better mood after 
anxiety med admin and talk with staff. patient asked for a sandwich and chips. Patient took 
all meds w/o complications. Patient went to sleep shortly after.



Plan: Plan: Crisis interruption, stabilization with medication adjustment, management and 
monitoring in a safe and therapeutic environment until stable. 



Patient has a Court Date on 12/15/22 (Thursday).

## 2022-12-15 NOTE — NUR
[facility] CONSERVATORSHIP



Mike attended court via zoom today. He did not contest the conservatorship. He is on 
[facility] conservatorship.



SIDDHARTHA Salamanca

## 2022-12-15 NOTE — NUR
Nursing Progress Note:



Problem:  Per 5150 pt. is here for SA by OD on street drugs after command hallucinations 
telling him to do so. Pt. states that he took "Fentanyl, black china, meth, 8 ball" in 
suicide attempt.



Interventions:  Provide medication administration & medication management; Maintained a safe 
& supportive environment; Clear & simple instructions; Direction & encouragement  regarding 
performance of ADLs; monitored behaviors & maintained clear boundaries; Patient physical 
assessment & 1:1 patient interview; Therapeutic conversation & active listening; Patient 
education & monitoring. 



Response:  Received patient sleeping in bed at change of shift.  Patient awakens and takes 
his medications as ordered, then joins peers for breakfast in group room.  Patient is 
anxious this morning for his 14:30 court appearance over conservatorship.  Patient pacing 
hallways and suddenly starts yelling out at staff.  2 mg of Ativan and 5 mg of Haldol given 
to patient and he slowly calms down. Later he is apologetic to staff for outburst.  Patient 
reports that he wants to get off conservatorship and live with his son.  He has been pacing 
hallways since 8:30 this morning.



After his court appearance, patient stated that he was glad that he is conserved, then he 
doesnt have to wonder anymore.  Patient has been pacing unit with a peer this afternoon. 



Plan:  Crisis interruption, stabilization with medication adjustment, management and 
monitoring in a safe and therapeutic environment until stable.

## 2022-12-15 NOTE — NUR
Reassessment: Pt continues eating well on Vegetarian diet, documented with

mostly 100% PO intake while receiving double protein TID. LBM 12/14 receiving routine and 
PRN bowel care. No change to recommendations at this time. Will continue to follow.

Recommendations:

1) Continue Vegetarian diet per pt request

2) Double protein TID

3) MVI supplementation per MD

4) Bowel care PRN

5) Weekly scaled weights

-------------------------------------------------------------------------------

Addendum: 12/15/22 at 0730 by Loc Villalba RD

-------------------------------------------------------------------------------

Amended: Links added.

## 2022-12-16 VITALS — DIASTOLIC BLOOD PRESSURE: 73 MMHG | SYSTOLIC BLOOD PRESSURE: 114 MMHG

## 2022-12-16 VITALS — SYSTOLIC BLOOD PRESSURE: 114 MMHG | DIASTOLIC BLOOD PRESSURE: 71 MMHG

## 2022-12-16 RX ADMIN — BUPRENORPHINE AND NALOXONE SCH TAB: 2; .5 TABLET SUBLINGUAL at 15:00

## 2022-12-16 RX ADMIN — DOCUSATE SODIUM SCH MG: 100 CAPSULE, LIQUID FILLED ORAL at 21:12

## 2022-12-16 RX ADMIN — IBUPROFEN PRN MG: 400 TABLET, FILM COATED ORAL at 21:11

## 2022-12-16 RX ADMIN — BENZTROPINE MESYLATE SCH MG: 1 TABLET ORAL at 21:10

## 2022-12-16 RX ADMIN — DIVALPROEX SODIUM SCH MG: 250 TABLET, DELAYED RELEASE ORAL at 07:40

## 2022-12-16 RX ADMIN — POLYETHYLENE GLYCOL 3350 SCH GM: 17 POWDER, FOR SOLUTION ORAL at 21:09

## 2022-12-16 RX ADMIN — Medication SCH PACKET: at 21:09

## 2022-12-16 RX ADMIN — BUPRENORPHINE AND NALOXONE SCH TAB: 2; .5 TABLET SUBLINGUAL at 07:41

## 2022-12-16 RX ADMIN — DIVALPROEX SODIUM SCH MG: 250 TABLET, DELAYED RELEASE ORAL at 21:10

## 2022-12-16 RX ADMIN — BENZOCAINE PRN APPLIC: 200 SOLUTION TOPICAL at 16:21

## 2022-12-16 RX ADMIN — BENZTROPINE MESYLATE SCH MG: 1 TABLET ORAL at 07:39

## 2022-12-16 RX ADMIN — THERA TABS SCH EACH: TAB at 07:40

## 2022-12-16 RX ADMIN — NICOTINE SCH PATCH: 21 PATCH, EXTENDED RELEASE TRANSDERMAL at 07:41

## 2022-12-16 RX ADMIN — DOCUSATE SODIUM SCH MG: 100 CAPSULE, LIQUID FILLED ORAL at 07:40

## 2022-12-16 NOTE — NUR
Sent updated notes to [facility] Public Guardian for placement purposes.



SIDDHARTHA Salamanca

## 2022-12-16 NOTE — NUR
Nursing Progress Note:          



Problem : 

Per 5150 pt. is here for SA by OD on street drugs after command hallucinations telling him 
to do so. Pt. states that he took "Fentanyl, black china, meth, 8 ball" in suicide attempt.



Interventions : 

Maintained a safe and supportive environment, ensured contract for safety, provided clear 
and simple instructions, attempted to orient to reality, monitored behavior and provided 
intervention as needed, provided active listening and positive encouragement, encouraged 
independent performance of ADLs, and maintained Q 15min safety checks. 



Response : 

Received Pt in bed sleeping w/o distress. Pt woke and was cooperative with vitals and 
returned to sleeping. Pt woke again for breakfast and ate well, and took AM meds before 
breakfast and w/o issue. Pt appears to be reacting well to the hearing yesterday in which he 
found out he will be conserved. Pt talking with staff freely and listening to music with 
headphones and pacing halls. Pt c/o mouth pain have holes in my mouth from eating Meth. Pt 
drank milks and other beverages for breakfast. Pt talked about past involvement with [employer] 
and satanic affiliation due to his mom being involved. Pt did not have any outbursts today 
and was overall pleasant and cooperative. 



Plan :

 Per Dr. Davalos, pt. continues to require a safe and supportive environment. Pt has been 
conserved and [facility]

## 2022-12-16 NOTE — NUR
Nursing Progress Note:



Problem:  Per 5150 pt. is here for SA by OD on street drugs after command hallucinations 
telling him to do so. Pt. states that he took "Fentanyl, black china, meth, 8 ball" in 
suicide attempt.



Interventions:  Provide medication administration & medication management; Maintained a safe 
& supportive environment; Clear & simple instructions; Direction & encouragement  regarding 
performance of ADLs; monitored behaviors & maintained clear boundaries; Patient physical 
assessment & 1:1 patient interview; Therapeutic conversation & active listening; Patient 
education & monitoring. 



Response:patient p-acing in hallway at shift change. Patient reports that he would like to 
take a shower and perseverates on this topic for a couple hours until the shower becomes 
available. Patient given evening meds w/o complications. Patient ate sandwich in his room. 
Patient states that he wants to get better and live with his son. The patient c/o anxiety, 
patient received 2mg Klonopin with good effect. Patient went t o bed a short time later.



Plan: Plan: Crisis interruption, stabilization with medication adjustment, management and 
monitoring in a safe and therapeutic environment until stable.

## 2022-12-17 VITALS — DIASTOLIC BLOOD PRESSURE: 73 MMHG | SYSTOLIC BLOOD PRESSURE: 121 MMHG

## 2022-12-17 VITALS — SYSTOLIC BLOOD PRESSURE: 120 MMHG | DIASTOLIC BLOOD PRESSURE: 63 MMHG

## 2022-12-17 RX ADMIN — BENZTROPINE MESYLATE SCH MG: 1 TABLET ORAL at 07:10

## 2022-12-17 RX ADMIN — POLYETHYLENE GLYCOL 3350 SCH GM: 17 POWDER, FOR SOLUTION ORAL at 20:51

## 2022-12-17 RX ADMIN — Medication SCH CAN: at 13:22

## 2022-12-17 RX ADMIN — Medication SCH PACKET: at 20:51

## 2022-12-17 RX ADMIN — Medication SCH CAN: at 17:36

## 2022-12-17 RX ADMIN — BUPRENORPHINE AND NALOXONE SCH TAB: 2; .5 TABLET SUBLINGUAL at 14:53

## 2022-12-17 RX ADMIN — DOCUSATE SODIUM SCH MG: 100 CAPSULE, LIQUID FILLED ORAL at 07:10

## 2022-12-17 RX ADMIN — BENZTROPINE MESYLATE SCH MG: 1 TABLET ORAL at 20:53

## 2022-12-17 RX ADMIN — DIVALPROEX SODIUM SCH MG: 250 TABLET, DELAYED RELEASE ORAL at 20:52

## 2022-12-17 RX ADMIN — DOCUSATE SODIUM SCH MG: 100 CAPSULE, LIQUID FILLED ORAL at 20:52

## 2022-12-17 RX ADMIN — THERA TABS SCH EACH: TAB at 07:10

## 2022-12-17 RX ADMIN — DIVALPROEX SODIUM SCH MG: 250 TABLET, DELAYED RELEASE ORAL at 07:10

## 2022-12-17 RX ADMIN — NICOTINE SCH PATCH: 21 PATCH, EXTENDED RELEASE TRANSDERMAL at 07:11

## 2022-12-17 RX ADMIN — BUPRENORPHINE AND NALOXONE SCH TAB: 2; .5 TABLET SUBLINGUAL at 07:09

## 2022-12-17 NOTE — NUR
Nursing Progress Note:          



Problem : 

Per 5150 pt. is here for SA by OD on street drugs after command hallucinations telling him 
to do so. Pt. states that he took "Fentanyl, black china, meth, 8 ball" in suicide attempt.



Interventions : 

Maintained a safe and supportive environment, ensured contract for safety, provided clear 
and simple instructions, attempted to orient to reality, monitored behavior and provided 
intervention as needed, provided active listening and positive encouragement, encouraged 
independent performance of ADLs, and maintained Q 15min safety checks. 



Response :  Patient was up soon after change of shift. Patient given coffee with hot 
chocolate.  Patient given meds for tooth/teeth pain.  Patient requesting medication often 
and sooner than time due. Patient is very needy.  Patient is having a lot of problems with 
his pain control.  Patient to be started on antibiotics today.  Patient is frustrated for 
being in such a confined place but is constantly pacing.  Patient denies suicidal ideation 
but feels hopeless because he is stuck here.  Patient states he doesn't want to go to his 
mom's because he doesn't want to be on meth.  Patient does not have the skills to self 
soothe/coping skills.  RN gave patient several suggestions to ride the stationary bike, 
color, watch T.V., read.  Patient would have none of it.  





Plan :

 Per Dr. Davalos, pt. continues to require a safe and supportive environment. Pt has been 
conserved and awaiting placement by [facility] Public Guardian. Public Guardian is aware 
patient needs referral to a dentist.

## 2022-12-17 NOTE — NUR
Nursing Progress Note:          



Problem: Per 5150 pt. is here for SA by OD on street drugs after command hallucinations 
telling him to do so. Pt. states that he took "Fentanyl, black china, meth, 8 ball" in 
suicide attempt.



Interventions: Maintained a safe and supportive environment, ensured contract for safety, 
provided clear and simple instructions, attempted to orient to reality, monitored behavior 
and provided intervention as needed, provided active listening and positive encouragement, 
encouraged independent performance of ADLs, and maintained Q 15min safety checks. 



Response: Pt resting in bed at start of shift. 1:1 assessment at bedside. Pt denies any MH 
symptoms SI/HI/AH/VH. When asked how he was doing he states doing good. Pt c/o of left 
side top and bottom toothache 7/10. HS meds administered along with PRN Motrin. Pt did not 
have snack and went to sleep. Pt awoke at 0300 and asked for Tylenol for his toothache then 
went back to sleep the rest of this shift.



Plan: Per Dr. Davalos, pt. continues to require a safe and supportive environment. Pt has 
been conserved and awaiting placement by [facility].

## 2022-12-18 VITALS — SYSTOLIC BLOOD PRESSURE: 99 MMHG | DIASTOLIC BLOOD PRESSURE: 55 MMHG

## 2022-12-18 VITALS — DIASTOLIC BLOOD PRESSURE: 61 MMHG | SYSTOLIC BLOOD PRESSURE: 102 MMHG

## 2022-12-18 RX ADMIN — DOCUSATE SODIUM SCH MG: 100 CAPSULE, LIQUID FILLED ORAL at 07:52

## 2022-12-18 RX ADMIN — DIVALPROEX SODIUM SCH MG: 250 TABLET, DELAYED RELEASE ORAL at 07:52

## 2022-12-18 RX ADMIN — POLYETHYLENE GLYCOL 3350 SCH GM: 17 POWDER, FOR SOLUTION ORAL at 20:20

## 2022-12-18 RX ADMIN — THERA TABS SCH EACH: TAB at 07:50

## 2022-12-18 RX ADMIN — Medication SCH CAN: at 18:09

## 2022-12-18 RX ADMIN — DOCUSATE SODIUM SCH MG: 100 CAPSULE, LIQUID FILLED ORAL at 20:21

## 2022-12-18 RX ADMIN — Medication SCH PACKET: at 20:24

## 2022-12-18 RX ADMIN — BENZTROPINE MESYLATE SCH MG: 1 TABLET ORAL at 07:50

## 2022-12-18 RX ADMIN — Medication SCH CAN: at 08:53

## 2022-12-18 RX ADMIN — BUPRENORPHINE AND NALOXONE SCH TAB: 2; .5 TABLET SUBLINGUAL at 07:50

## 2022-12-18 RX ADMIN — BENZTROPINE MESYLATE SCH MG: 1 TABLET ORAL at 20:21

## 2022-12-18 RX ADMIN — DIVALPROEX SODIUM SCH MG: 250 TABLET, DELAYED RELEASE ORAL at 20:22

## 2022-12-18 RX ADMIN — Medication SCH CAN: at 13:18

## 2022-12-18 RX ADMIN — BUPRENORPHINE AND NALOXONE SCH TAB: 2; .5 TABLET SUBLINGUAL at 15:36

## 2022-12-18 RX ADMIN — NICOTINE SCH PATCH: 21 PATCH, EXTENDED RELEASE TRANSDERMAL at 07:53

## 2022-12-18 NOTE — NUR
Nursing Progress Note:          



Problem : 

Per 5150 pt. is here for SA by OD on street drugs after command hallucinations telling him 
to do so. Pt. states that he took "Fentanyl, black china, meth, 8 ball" in suicide attempt.



Interventions : 

Maintained a safe and supportive environment, ensured contract for safety, provided clear 
and simple instructions, attempted to orient to reality, monitored behavior and provided 
intervention as needed, provided active listening and positive encouragement, encouraged 
independent performance of ADLs, and maintained Q 15min safety checks. 



Response :  Pt sleeping in bed at change of shift. Pt states he is feeling "ok" and doesnt 
want to get p for snacks tonight. Pt said his day was good and he just slept a lot. Pt 
declined to engage in much converstation, took his HS meds and states he wants to keep his 
nicotine patch on tonight. "Sometimes I like to leave it on." Pt states he has no needs 
tonight, declines pain prn for tooth. Pt went back to sleep.



Plan :

 Per Dr. Davalos, pt. continues to require a safe and supportive environment. Pt has been 
conserved and awaiting placement by [facility] Public Guardian. Public Guardian is aware 
patient needs referral to a dentist.

## 2022-12-18 NOTE — NUR
Nursing Progress Note:          



Problem: Per 5150 pt. is here for SA by OD on street drugs after command hallucinations 
telling him to do so. Pt. states that he took "Fentanyl, black china, meth, 8 ball" in 
suicide attempt.



Interventions: Maintained a safe and supportive environment, ensured contract for safety, 
provided clear and simple instructions, attempted to orient to reality, monitored behavior 
and provided intervention as needed, provided active listening and positive encouragement, 
encouraged independent performance of ADLs, and maintained Q 15min safety checks. 



Response: Pt resting in bed at start of shift. 1:1 assessment at bedside. Pt denies any MH 
symptoms SI/HI/AH/VH. When asked how he was doing he states tired. Pt continues to have 
left side top and bottom toothache 10/10. Amoxicillin started tonight. HS meds administered. 
Pt did not have snack tonight. Pt slept through the night.



Plan: Pt continues to require a safe and supportive environment. Pt has been conserved and 
awaiting placement by [facility].

## 2022-12-18 NOTE — NUR
Nursing Progress Note:          



Problem : 

Per 5150 pt. is here for SA by OD on street drugs after command hallucinations telling him 
to do so. Pt. states that he took "Fentanyl, black china, meth, 8 ball" in suicide attempt.



Interventions : 

Maintained a safe and supportive environment, ensured contract for safety, provided clear 
and simple instructions, attempted to orient to reality, monitored behavior and provided 
intervention as needed, provided active listening and positive encouragement, encouraged 
independent performance of ADLs, and maintained Q 15min safety checks. 



Response :  Patient was up soon after change of shift. Patient took his medication and was 
very subdued today.  Patient did not complain of pain. Patient didn't ask for anything 
extra.  Patient states he feels he is losing his voice.  Patient is on Amoxicillin 500 mg 
TID for his infected tooth.  Patient did not require additional pain medication or 
anti-anxiety medication.  Patient is usually pacing all day but spent most of the day in 
bed. No fever.



Plan :

 Per Dr. Davalos, pt. continues to require a safe and supportive environment. Pt has been 
conserved and awaiting placement by [facility] Public Guardian. Public Guardian is aware 
patient needs referral to a dentist.

## 2022-12-19 VITALS — DIASTOLIC BLOOD PRESSURE: 66 MMHG | SYSTOLIC BLOOD PRESSURE: 121 MMHG

## 2022-12-19 VITALS — DIASTOLIC BLOOD PRESSURE: 61 MMHG | SYSTOLIC BLOOD PRESSURE: 120 MMHG

## 2022-12-19 RX ADMIN — THERA TABS SCH EACH: TAB at 07:38

## 2022-12-19 RX ADMIN — DIVALPROEX SODIUM SCH MG: 250 TABLET, DELAYED RELEASE ORAL at 20:28

## 2022-12-19 RX ADMIN — Medication SCH CAN: at 18:00

## 2022-12-19 RX ADMIN — BUPRENORPHINE AND NALOXONE SCH TAB: 2; .5 TABLET SUBLINGUAL at 15:06

## 2022-12-19 RX ADMIN — Medication SCH CAN: at 08:00

## 2022-12-19 RX ADMIN — BENZTROPINE MESYLATE SCH MG: 1 TABLET ORAL at 07:38

## 2022-12-19 RX ADMIN — POLYETHYLENE GLYCOL 3350 SCH GM: 17 POWDER, FOR SOLUTION ORAL at 20:29

## 2022-12-19 RX ADMIN — MAGNESIUM HYDROXIDE PRN ML: 400 SUSPENSION ORAL at 17:00

## 2022-12-19 RX ADMIN — Medication SCH CAN: at 13:41

## 2022-12-19 RX ADMIN — DOCUSATE SODIUM SCH MG: 100 CAPSULE, LIQUID FILLED ORAL at 07:38

## 2022-12-19 RX ADMIN — Medication SCH PACKET: at 20:28

## 2022-12-19 RX ADMIN — BUPRENORPHINE AND NALOXONE SCH TAB: 2; .5 TABLET SUBLINGUAL at 07:37

## 2022-12-19 RX ADMIN — DOCUSATE SODIUM SCH MG: 100 CAPSULE, LIQUID FILLED ORAL at 20:27

## 2022-12-19 RX ADMIN — SODIUM CHLORIDE PRN GM: 0.9 IRRIGANT IRRIGATION at 17:00

## 2022-12-19 RX ADMIN — DIVALPROEX SODIUM SCH MG: 250 TABLET, DELAYED RELEASE ORAL at 07:38

## 2022-12-19 RX ADMIN — BENZTROPINE MESYLATE SCH MG: 1 TABLET ORAL at 20:28

## 2022-12-19 RX ADMIN — NICOTINE SCH PATCH: 21 PATCH, EXTENDED RELEASE TRANSDERMAL at 07:39

## 2022-12-19 RX ADMIN — ALUMINUM HYDROXIDE, MAGNESIUM HYDROXIDE, AND SIMETHICONE PRN ML: 200; 200; 20 SUSPENSION ORAL at 15:06

## 2022-12-19 NOTE — NUR
Nursing Progress Note:          



Problem : 

Per 5150 pt. is here for SA by OD on street drugs after command hallucinations telling him 
to do so. Pt. states that he took "Fentanyl, black china, meth, 8 ball" in suicide attempt.



Interventions : 

Maintained a safe and supportive environment, ensured contract for safety, provided clear 
and simple instructions, attempted to orient to reality, monitored behavior and provided 
intervention as needed, provided active listening and positive encouragement, encouraged 
independent performance of ADLs, and maintained Q 15min safety checks. 



Response :  Patient was up soon after change of shift. Patient was calm today and states he 
is not feeling well.  Patient is afebrile but is feeling malaise and he feels like he is 
losing his voice.  Patient is also having abdominal cramping.  Patient tried to had a BM to 
no avail.  RN had given patient Mylanta earlier for "sour stomach". Patient points to his 
umbilical region where the cramping is located.  Patient is on Amoxicillin 500 TID for and 
infected tooth. Patient is walking around the unit more today than yesterday. Patient has 
not had a BM in a few days and RN believes his pain might be due to constipation.  RN spoke 
with ZULEMA Coker who ordered M.O.M. and an additional 200 mg colace.   



Plan :

 Per Dr. Davalos, pt. continues to require a safe and supportive environment. Pt has been 
conserved and awaiting placement by [facility] Public Guardian. Public Guardian is aware 
patient needs referral to a dentist.

## 2022-12-19 NOTE — NUR
Nursing Progress Note:          



Problem : 

Per 5150 pt. is here for SA by OD on street drugs after command hallucinations telling him 
to do so. Pt. states that he took "Fentanyl, black china, meth, 8 ball" in suicide attempt.



Interventions : 

Maintained a safe and supportive environment, ensured contract for safety, provided clear 
and simple instructions, attempted to orient to reality, monitored behavior and provided 
intervention as needed, provided active listening and positive encouragement, encouraged 
independent performance of ADLs, and maintained Q 15min safety checks. 



Response :  Pt was in bed resting at change of shift. He reports feeling frustrated that he 
is still here and wants to go. States he is tired of being here and slept all day. Pt did 
have a large BM tonight and is no longer having cramping and stomach discomfort. Pt denies 
s/i, denies a/vh. does not appear to be RIS. Pt is pleasant and calm in conversation. Pt 
declined a snack, took HS meds and went back to sleep.



Plan :

 Per Dr. Davalos, pt. continues to require a safe and supportive environment. Pt has been 
conserved and awaiting placement by [facility] Public Guardian. Public Guardian is aware 
patient needs referral to a dentist.

## 2022-12-20 VITALS — DIASTOLIC BLOOD PRESSURE: 44 MMHG | SYSTOLIC BLOOD PRESSURE: 99 MMHG

## 2022-12-20 VITALS — DIASTOLIC BLOOD PRESSURE: 58 MMHG | SYSTOLIC BLOOD PRESSURE: 97 MMHG

## 2022-12-20 RX ADMIN — NICOTINE SCH PATCH: 21 PATCH, EXTENDED RELEASE TRANSDERMAL at 08:32

## 2022-12-20 RX ADMIN — BUPRENORPHINE AND NALOXONE SCH TAB: 2; .5 TABLET SUBLINGUAL at 08:30

## 2022-12-20 RX ADMIN — BENZTROPINE MESYLATE SCH MG: 1 TABLET ORAL at 08:31

## 2022-12-20 RX ADMIN — BENZOCAINE PRN APPLIC: 200 SOLUTION TOPICAL at 16:26

## 2022-12-20 RX ADMIN — POLYETHYLENE GLYCOL 3350 SCH GM: 17 POWDER, FOR SOLUTION ORAL at 20:08

## 2022-12-20 RX ADMIN — Medication SCH CAN: at 08:00

## 2022-12-20 RX ADMIN — THERA TABS SCH EACH: TAB at 08:30

## 2022-12-20 RX ADMIN — DOCUSATE SODIUM SCH MG: 100 CAPSULE, LIQUID FILLED ORAL at 08:32

## 2022-12-20 RX ADMIN — Medication SCH CAN: at 13:00

## 2022-12-20 RX ADMIN — DIVALPROEX SODIUM SCH MG: 250 TABLET, DELAYED RELEASE ORAL at 20:09

## 2022-12-20 RX ADMIN — Medication SCH PACKET: at 20:08

## 2022-12-20 RX ADMIN — Medication SCH CAN: at 18:00

## 2022-12-20 RX ADMIN — BENZTROPINE MESYLATE SCH MG: 1 TABLET ORAL at 20:09

## 2022-12-20 RX ADMIN — DOCUSATE SODIUM SCH MG: 100 CAPSULE, LIQUID FILLED ORAL at 20:08

## 2022-12-20 RX ADMIN — BUPRENORPHINE AND NALOXONE SCH TAB: 2; .5 TABLET SUBLINGUAL at 16:25

## 2022-12-20 RX ADMIN — DIVALPROEX SODIUM SCH MG: 250 TABLET, DELAYED RELEASE ORAL at 08:30

## 2022-12-20 NOTE — NUR
Nursing Progress Note: Mike



Problem: 

Per 5150 pt. is here for SA by OD on street drugs after command hallucinations telling him 
to do so. Pt. states that he took "Fentanyl, black china, meth, 8 ball" in suicide attempt.

Interventions: 

Maintained a safe and supportive environment, ensured contract for safety, provided clear 
and simple instructions, attempted to orient to reality, monitored behavior and provided 
intervention as needed, provided active listening and positive encouragement, encouraged 
independent performance of ADLs, and maintained Q 15min safety checks. 



Response: 

Received pt. asleep and awoke for his medication he denies SI, HI, AH, VH, currently no DC 
plan as placement is pending Pt. continues on PO ABX for dental infection, no ASE reported. 
He ate breakfast in the dining room with cohorts socializing at brief periods of time.  Pt. 
approached writer c/o anxiety and requested klonopin; PRN given. At 1200 he was picked up by 
his county rep. and was taken to the dentist.  Pt. JANES for lunch and returned later 
afternoon, he reported I sat in dental office for hours and couldnt be seen lewis I didnt 
have an ID Pt. was given afternoon meds, PRN Tylenol and anbesol.  Pt. spent the remainder 
of shift in his room and rested until dinner. No f/u with dentist scheduled.

 

Plan:

Per provider, pt. continues to require a safe and supportive environment. Conservatorship is 
recommended.

## 2022-12-20 NOTE — NUR
Nursing Progress Note: 



Problem: 

Per 5150 pt. is here for SA by OD on street drugs after command hallucinations telling him 
to do so. Pt. states that he took "Fentanyl, black china, meth, 8 ball" in suicide attempt.



Interventions: 

Maintained a safe and supportive environment, ensured contract for safety, provided clear 
and simple instructions, attempted to orient to reality, monitored behavior and provided 
intervention as needed, provided active listening and positive encouragement, encouraged 
independent performance of ADLs, and maintained Q 15min safety checks. 



Response: 

Pt was napping in his room at change of shift. He is calm and cooperative, states he couldnt 
get his tooth worked on because the [facility] gave his ID to his mom and they couldnt get a 
hold of his mom. Pt c/o tooth pain and was provided w/ambesol. Pt isolates to his room and 
napped. Pt declined to have snacks. Pt took hs meds and went to bed. 

 

Plan:

Per provider, pt. continues to require a safe and supportive environment. Conservatorship is 
recommended.

## 2022-12-21 VITALS — DIASTOLIC BLOOD PRESSURE: 57 MMHG | SYSTOLIC BLOOD PRESSURE: 93 MMHG

## 2022-12-21 VITALS — SYSTOLIC BLOOD PRESSURE: 131 MMHG | DIASTOLIC BLOOD PRESSURE: 91 MMHG

## 2022-12-21 RX ADMIN — Medication SCH PACKET: at 20:11

## 2022-12-21 RX ADMIN — BENZOCAINE PRN APPLIC: 200 SOLUTION TOPICAL at 16:07

## 2022-12-21 RX ADMIN — DOCUSATE SODIUM SCH MG: 100 CAPSULE, LIQUID FILLED ORAL at 19:25

## 2022-12-21 RX ADMIN — Medication SCH CAN: at 08:24

## 2022-12-21 RX ADMIN — BUPRENORPHINE AND NALOXONE SCH TAB: 2; .5 TABLET SUBLINGUAL at 08:15

## 2022-12-21 RX ADMIN — DOCUSATE SODIUM SCH MG: 100 CAPSULE, LIQUID FILLED ORAL at 08:14

## 2022-12-21 RX ADMIN — NICOTINE SCH PATCH: 21 PATCH, EXTENDED RELEASE TRANSDERMAL at 08:16

## 2022-12-21 RX ADMIN — DIVALPROEX SODIUM SCH MG: 250 TABLET, DELAYED RELEASE ORAL at 08:15

## 2022-12-21 RX ADMIN — Medication SCH CAN: at 13:41

## 2022-12-21 RX ADMIN — THERA TABS SCH EACH: TAB at 08:14

## 2022-12-21 RX ADMIN — BENZOCAINE PRN APPLIC: 200 SOLUTION TOPICAL at 09:27

## 2022-12-21 RX ADMIN — BENZTROPINE MESYLATE SCH MG: 1 TABLET ORAL at 08:15

## 2022-12-21 RX ADMIN — BENZTROPINE MESYLATE SCH MG: 1 TABLET ORAL at 19:25

## 2022-12-21 RX ADMIN — BUPRENORPHINE AND NALOXONE SCH TAB: 2; .5 TABLET SUBLINGUAL at 14:02

## 2022-12-21 RX ADMIN — DIVALPROEX SODIUM SCH MG: 250 TABLET, DELAYED RELEASE ORAL at 19:26

## 2022-12-21 RX ADMIN — POLYETHYLENE GLYCOL 3350 SCH GM: 17 POWDER, FOR SOLUTION ORAL at 20:11

## 2022-12-21 RX ADMIN — Medication SCH CAN: at 18:00

## 2022-12-21 NOTE — NUR
Nursing Progress Note: Mike



Problem: 

Per 5150 pt. is here for SA by OD on street drugs after command hallucinations telling him 
to do so. Pt. states that he took "Fentanyl, black china, meth, 8 ball" in suicide attempt.

Interventions: 

Maintained a safe and supportive environment, ensured contract for safety, provided clear 
and simple instructions, attempted to orient to reality, monitored behavior and provided 
intervention as needed, provided active listening and positive encouragement, encouraged 
independent performance of ADLs, and maintained Q 15min safety checks. 



Response: 

Received pt. asleep and awoke for his medication he denies SI, HI, AH, VH, currently has no 
DC plan. He continues on PO ABX Amoxicillin for dental infection, he denies ASEs. Pt. was 
given Anbesol and Tylenol X2 this shift. Pt. ate all meals in the dining room with cohorts 
and is often observed socializing with cohorts. Pt. was heard yelling out in his room, he 
reported hearing voices; PRN Klonopin administered. Pt. was able to calm himself down and no 
further incidence took place. He spent most of the shift pacing and watching TV. Pt. has 
good hygiene, well groomed, and wears street clothes. 

 

Plan:

Per provider, pt. continues to require a safe and supportive environment. Conservatorship is 
recommended.

## 2022-12-22 VITALS — DIASTOLIC BLOOD PRESSURE: 58 MMHG | SYSTOLIC BLOOD PRESSURE: 107 MMHG

## 2022-12-22 VITALS — DIASTOLIC BLOOD PRESSURE: 50 MMHG | SYSTOLIC BLOOD PRESSURE: 92 MMHG

## 2022-12-22 RX ADMIN — THERA TABS SCH EACH: TAB at 07:13

## 2022-12-22 RX ADMIN — BENZOCAINE PRN APPLIC: 200 SOLUTION TOPICAL at 20:45

## 2022-12-22 RX ADMIN — DIVALPROEX SODIUM SCH MG: 250 TABLET, DELAYED RELEASE ORAL at 20:13

## 2022-12-22 RX ADMIN — Medication SCH PACKET: at 20:46

## 2022-12-22 RX ADMIN — BUPRENORPHINE AND NALOXONE SCH TAB: 2; .5 TABLET SUBLINGUAL at 14:07

## 2022-12-22 RX ADMIN — BENZTROPINE MESYLATE SCH MG: 1 TABLET ORAL at 07:15

## 2022-12-22 RX ADMIN — DIVALPROEX SODIUM SCH MG: 250 TABLET, DELAYED RELEASE ORAL at 07:14

## 2022-12-22 RX ADMIN — Medication SCH CAN: at 18:00

## 2022-12-22 RX ADMIN — POLYETHYLENE GLYCOL 3350 SCH GM: 17 POWDER, FOR SOLUTION ORAL at 20:46

## 2022-12-22 RX ADMIN — BENZOCAINE PRN APPLIC: 200 SOLUTION TOPICAL at 13:39

## 2022-12-22 RX ADMIN — Medication SCH CAN: at 08:00

## 2022-12-22 RX ADMIN — DOCUSATE SODIUM SCH MG: 100 CAPSULE, LIQUID FILLED ORAL at 07:14

## 2022-12-22 RX ADMIN — DOCUSATE SODIUM SCH MG: 100 CAPSULE, LIQUID FILLED ORAL at 20:11

## 2022-12-22 RX ADMIN — ALUMINUM HYDROXIDE, MAGNESIUM HYDROXIDE, AND SIMETHICONE PRN ML: 200; 200; 20 SUSPENSION ORAL at 09:09

## 2022-12-22 RX ADMIN — BUPRENORPHINE AND NALOXONE SCH TAB: 2; .5 TABLET SUBLINGUAL at 07:14

## 2022-12-22 RX ADMIN — NICOTINE SCH PATCH: 21 PATCH, EXTENDED RELEASE TRANSDERMAL at 07:15

## 2022-12-22 RX ADMIN — Medication SCH CAN: at 13:02

## 2022-12-22 RX ADMIN — BENZTROPINE MESYLATE SCH MG: 1 TABLET ORAL at 20:12

## 2022-12-22 NOTE — NUR
Emailed Mike Mckeon's conservator, regarding his ID and if there is an upcoming dental 
appt.



SIDDHARTHA Salamanca

## 2022-12-22 NOTE — NUR
Nursing Progress Note: Mike



Problem: 

Per 5150 pt. is here for SA by OD on street drugs after command hallucinations telling him 
to do so. Pt. states that he took "Fentanyl, black china, meth, 8 ball" in suicide attempt.





Interventions: 

Maintained a safe and supportive environment, ensured contract for safety, provided clear 
and simple instructions, attempted to orient to reality, monitored behavior and provided 
intervention as needed, provided active listening and positive encouragement, encouraged 
independent performance of ADLs, and maintained Q 15min safety checks. 



Response: 

Patient was found pacing hallways talking to other patient. Patient was later heard walking 
down hallways singing very loudly to the music on his radio. Patient requested if he could 
have his medications earlier so he could go to bed right after snack since he had been up 
all day. Nurse had to wake up patient to give medications which he took with out issue. 
Nurse asked if patient was still feeling constipated. Patient stated yes even though he had 
gone this morning. Patient again started to talk about his weight loss goals and asked if he 
keeps walking until snack could he burn off the calories he would eat at snack. Patient got 
in line for snack. Patient ate snack and went to bed. 





.Plan:

Per provider, pt. continues to require a safe and supportive environment. Conservatorship is 
recommended.

## 2022-12-22 NOTE — NUR
DENTAL APPT



Thursday 12/29/2022 @ 3:00 p.m. in [place]. [facility] will transport again. Public Guardian has 
his ID and faxed a copy to [facility].



SIDDHARTHA Salamanca

## 2022-12-22 NOTE — NUR
Nursing Progress Note: Mike



Problem: 

Per 5150 pt. is here for SA by OD on street drugs after command hallucinations telling him 
to do so. Pt. states that he took "Fentanyl, black china, meth, 8 ball" in suicide attempt.

Interventions: 

Maintained a safe and supportive environment, ensured contract for safety, provided clear 
and simple instructions, attempted to orient to reality, monitored behavior and provided 
intervention as needed, provided active listening and positive encouragement, encouraged 
independent performance of ADLs, and maintained Q 15min safety checks. 



Response: 

Received pt. asleep, he awoke to take his medications. Pt. reports his tooth is feeling 
better and PO ABX continues. Pt. paced/ exercised on the unit throughout the day. He was 
observed interacting and socializing with cohorts. Pt. has been pleasant and cooperative. He 
has received PRN Tylenol X2, ambesol X2; with good results. Pt. approached writer requesting 
something for anxiety and presented with good insight reporting  I can feel tension on 
the singh, because this guys pissed off, I see Iv acted like this before PRN given with good 
results Pt. denies SI, HI, AH, VH . He ate all meals in the dining room with cohorts, and 
napped X1 today. At 1530 pt. approached writer stating I need a shot because Im tired of 
being here Writer advised pt. a shot was not available and went retrieved Klonopin, when 
pt. began beating on the exit doors stating I want a shot, you all are a bunch of racists 
against black people and Native Americans Security was called pt. was escorted to a quiet 
area Klonopin was administered and a onetime order of Ativan 2mg was received; med was 
administered.  Pt. was able to compose himself and joined cohorts for snacks. He has good 
hygiene, well groomed, and wears street clothes. 

Plan:

Per provider, pt. continues to require a safe and supportive environment. Conservatorship is 
recommended.

## 2022-12-22 NOTE — NUR
Reassessment: Pt continues on Vegetarian diet while receiving double 

protein TID, avg intake 80% of meals meeting est needs at this time. Noted 

that Ensure TID was ordered 12/17 r/t to an apparent tooth ache, though pt 

continues to eat well. This is not an appropriate interventions and ONS 

should be discontinued as it is not needed. LBM 12/20. Will continue to 

monitor.

Recommendations:

1) Continue Vegetarian diet per pt request

2) Double protein TID

3) DISCONTINUE ENSURE TID

4) MVI supplementation per MD

5) Bowel care PRN

6) Weekly scaled weights

-------------------------------------------------------------------------------

Addendum: 12/22/22 at 0725 by Loc Villalba RD

-------------------------------------------------------------------------------

Amended: Links added.

## 2022-12-23 VITALS — DIASTOLIC BLOOD PRESSURE: 67 MMHG | SYSTOLIC BLOOD PRESSURE: 124 MMHG

## 2022-12-23 VITALS — SYSTOLIC BLOOD PRESSURE: 123 MMHG | DIASTOLIC BLOOD PRESSURE: 67 MMHG

## 2022-12-23 RX ADMIN — DIVALPROEX SODIUM SCH MG: 250 TABLET, DELAYED RELEASE ORAL at 20:28

## 2022-12-23 RX ADMIN — DOCUSATE SODIUM SCH MG: 100 CAPSULE, LIQUID FILLED ORAL at 20:30

## 2022-12-23 RX ADMIN — POLYETHYLENE GLYCOL 3350 SCH GM: 17 POWDER, FOR SOLUTION ORAL at 20:28

## 2022-12-23 RX ADMIN — DOCUSATE SODIUM SCH MG: 100 CAPSULE, LIQUID FILLED ORAL at 07:59

## 2022-12-23 RX ADMIN — THERA TABS SCH EACH: TAB at 07:59

## 2022-12-23 RX ADMIN — DIVALPROEX SODIUM SCH MG: 250 TABLET, DELAYED RELEASE ORAL at 07:58

## 2022-12-23 RX ADMIN — NICOTINE POLACRILEX PRN MG: 2 LOZENGE ORAL at 13:36

## 2022-12-23 RX ADMIN — Medication SCH CAN: at 18:00

## 2022-12-23 RX ADMIN — Medication SCH CAN: at 08:00

## 2022-12-23 RX ADMIN — BUPRENORPHINE AND NALOXONE SCH TAB: 2; .5 TABLET SUBLINGUAL at 14:32

## 2022-12-23 RX ADMIN — BENZTROPINE MESYLATE SCH MG: 1 TABLET ORAL at 07:59

## 2022-12-23 RX ADMIN — NICOTINE SCH PATCH: 21 PATCH, EXTENDED RELEASE TRANSDERMAL at 07:58

## 2022-12-23 RX ADMIN — BUPRENORPHINE AND NALOXONE SCH TAB: 2; .5 TABLET SUBLINGUAL at 07:59

## 2022-12-23 RX ADMIN — Medication SCH PACKET: at 20:26

## 2022-12-23 RX ADMIN — BENZTROPINE MESYLATE SCH MG: 1 TABLET ORAL at 20:28

## 2022-12-23 RX ADMIN — Medication SCH CAN: at 13:36

## 2022-12-23 NOTE — NUR
Nursing Progress Note: 



Problem: 

Per 5150 pt. is here for SA by OD on street drugs after command hallucinations telling him 
to do so. Pt. states that he took "Fentanyl, black china, meth, 8 ball" in suicide attempt.



Interventions: 

Maintained a safe and supportive environment, ensured contract for safety, provided clear 
and simple instructions, attempted to orient to reality, monitored behavior and provided 
intervention as needed, provided active listening and positive encouragement, encouraged 
independent performance of ADLs, and maintained Q 15min safety checks. 



Response:  Patient asleep, but wakes for breakfast and AM med pass. Hes compliant with all 
medications, then requests Tylenol for tooth pain, and Klonipin for anxiety. Patient 
pleasant and cooperative in the morning, but tends to get agitated later in the day. He has 
been here so long that he would rather go to [facility] than have to stay here not knowing when 
his life will move forward. In [facility] you at least get to go outside every day. You also know 
how long youre going to be there. This just isnt right. Patient spends the day walking 
the halls, putting himself in the middle of everything. He believes fighting is the solution 
to all disagreements. This is learned behavior that he cant seem to unlearn. 



Plan:Per provider, pt. continues to require a safe and supportive environment. 
Conservatorship is recommended.

## 2022-12-23 NOTE — NUR
Sent updated notes to Public Guardian for placement purposes (12/15-12/22). Requested LPS 
paperwork as well.



SIDDHARTHA Salamanca

## 2022-12-23 NOTE — NUR
Nursing Progress Note: Mike



Problem: 

Per 5150 pt. is here for SA by OD on street drugs after command hallucinations telling him 
to do so. Pt. states that he took "Fentanyl, black china, meth, 8 ball" in suicide attempt.





Interventions: 

Maintained a safe and supportive environment, ensured contract for safety, provided clear 
and simple instructions, attempted to orient to reality, monitored behavior and provided 
intervention as needed, provided active listening and positive encouragement, encouraged 
independent performance of ADLs, and maintained Q 15min safety checks. 



Response: Patient was observed sleeping at change of shift. Patient continued to sleep until 
getting up to take night medications. Patient then got in line to eat snack. Patient then 
became worried that the cookie at snack will cause him to gain all the weight he lost back. 
Patient was found pacing in the singh stating he was trying to burn off cookie calories. 
Patient later asked for Tylenol and abersol for tooth pain. Patient took pain medication and 
went to bed. Patient woke up at 4 am asking for water and retuned to pacing. 



Plan:

Per provider, pt. continues to require a safe and supportive environment. Conservatorship is 
recommended.

## 2022-12-24 VITALS — DIASTOLIC BLOOD PRESSURE: 113 MMHG

## 2022-12-24 VITALS — DIASTOLIC BLOOD PRESSURE: 74 MMHG | SYSTOLIC BLOOD PRESSURE: 113 MMHG

## 2022-12-24 VITALS — SYSTOLIC BLOOD PRESSURE: 98 MMHG | DIASTOLIC BLOOD PRESSURE: 49 MMHG

## 2022-12-24 RX ADMIN — DOCUSATE SODIUM SCH MG: 100 CAPSULE, LIQUID FILLED ORAL at 08:00

## 2022-12-24 RX ADMIN — DIVALPROEX SODIUM SCH MG: 250 TABLET, DELAYED RELEASE ORAL at 08:00

## 2022-12-24 RX ADMIN — THERA TABS SCH EACH: TAB at 08:00

## 2022-12-24 RX ADMIN — DIVALPROEX SODIUM SCH MG: 250 TABLET, DELAYED RELEASE ORAL at 20:41

## 2022-12-24 RX ADMIN — Medication SCH PACKET: at 20:42

## 2022-12-24 RX ADMIN — BUPRENORPHINE AND NALOXONE SCH TAB: 2; .5 TABLET SUBLINGUAL at 08:00

## 2022-12-24 RX ADMIN — Medication SCH CAN: at 13:00

## 2022-12-24 RX ADMIN — POLYETHYLENE GLYCOL 3350 SCH GM: 17 POWDER, FOR SOLUTION ORAL at 20:42

## 2022-12-24 RX ADMIN — BENZTROPINE MESYLATE SCH MG: 1 TABLET ORAL at 08:00

## 2022-12-24 RX ADMIN — Medication SCH CAN: at 18:00

## 2022-12-24 RX ADMIN — NICOTINE SCH PATCH: 21 PATCH, EXTENDED RELEASE TRANSDERMAL at 08:00

## 2022-12-24 RX ADMIN — BUPRENORPHINE AND NALOXONE SCH TAB: 2; .5 TABLET SUBLINGUAL at 14:00

## 2022-12-24 RX ADMIN — Medication SCH CAN: at 08:00

## 2022-12-24 RX ADMIN — BENZTROPINE MESYLATE SCH MG: 1 TABLET ORAL at 20:41

## 2022-12-24 RX ADMIN — DOCUSATE SODIUM SCH MG: 100 CAPSULE, LIQUID FILLED ORAL at 20:42

## 2022-12-24 NOTE — NUR
Nursing Progress Note: 



Problem: 

Per 5150 pt. is here for SA by OD on street drugs after command hallucinations telling him 
to do so. Pt. states that he took "Fentanyl, black china, meth, 8 ball" in suicide attempt.



Interventions: 

Maintained a safe and supportive environment, ensured contract for safety, provided clear 
and simple instructions, attempted to orient to reality, monitored behavior and provided 
intervention as needed, provided active listening and positive encouragement, encouraged 
independent performance of ADLs, and maintained Q 15min safety checks. 



Response:  Patient was found sitting in community room socializing with other patient at 
change of shift. Patient allowed vitals to be taken and then started pacing halls with other 
patients. Patient retuned to community room to participate in snack. Patient was asked if he 
was feeling depressed and stated no just tired of being here and tired. Patient began asking 
nurse repetitive questions. Nurse would give patient an answer he would say okay walk away 
and then return shortly after asking the same question. Eventually patient retuned to bed 
were he was awaken to be given night medications. Patent took night medications, apologized 
for asking repeat questions and returned to bed. 





Plan:Per provider, pt. continues to require a safe and supportive environment. 
Conservatorship is recommended.

## 2022-12-24 NOTE — NUR
Pt is rapidly pacing the halls and making loud delusional accusations, "I'm going to heaven 
with the [employer]" then yelled at the end of the hallway. This writer did not witness but 
heard the loud angry yelling and loud delusional statements while pacing the halls. Dr. Davalos came out of his office observing pt. This nurse obtained one time medications from 
Dr. Davalos then administered per orders.

## 2022-12-24 NOTE — NUR
Problem: 

Per 5150 pt. is here for SA by OD on street drugs after command hallucinations telling him 
to do so. Pt. states that he took "Fentanyl, black china, meth, 8 ball" in suicide attempt.

Interventions: 

Maintained a safe and supportive environment, ensured contract for safety, provided clear 
and simple instructions, attempted to orient to reality, monitored behavior and provided 
intervention as needed, provided active listening and positive encouragement, encouraged 
independent performance of ADLs, and maintained Q 15min safety checks. 



Response:  Patient slept in this morning. He did not want breakfast. Patient continues to be 
obsessed with losing weight, so he doesnt want to eat. He continues to be compliant with 
all medications. Patient continues to walk all day, even when he hurts. He also has a cavity 
in his mouth that hurts, so he gets angry about that. Afternoons always seem to be hard for 
him and agitation comes easy for him. PRN Klonopin given with good results. Patient calms 
and resumes his walking.





Plan:Per provider, pt. continues to require a safe and supportive environment. 
Conservatorship is recommended.

## 2022-12-25 VITALS — DIASTOLIC BLOOD PRESSURE: 68 MMHG | SYSTOLIC BLOOD PRESSURE: 103 MMHG

## 2022-12-25 VITALS — DIASTOLIC BLOOD PRESSURE: 77 MMHG | SYSTOLIC BLOOD PRESSURE: 124 MMHG

## 2022-12-25 RX ADMIN — NICOTINE SCH PATCH: 21 PATCH, EXTENDED RELEASE TRANSDERMAL at 08:00

## 2022-12-25 RX ADMIN — DOCUSATE SODIUM SCH MG: 100 CAPSULE, LIQUID FILLED ORAL at 20:36

## 2022-12-25 RX ADMIN — DIVALPROEX SODIUM SCH MG: 250 TABLET, DELAYED RELEASE ORAL at 20:36

## 2022-12-25 RX ADMIN — DIVALPROEX SODIUM SCH MG: 250 TABLET, DELAYED RELEASE ORAL at 07:59

## 2022-12-25 RX ADMIN — POLYETHYLENE GLYCOL 3350 SCH GM: 17 POWDER, FOR SOLUTION ORAL at 21:00

## 2022-12-25 RX ADMIN — Medication SCH CAN: at 18:03

## 2022-12-25 RX ADMIN — BENZTROPINE MESYLATE SCH MG: 1 TABLET ORAL at 20:36

## 2022-12-25 RX ADMIN — BUPRENORPHINE AND NALOXONE SCH TAB: 2; .5 TABLET SUBLINGUAL at 16:08

## 2022-12-25 RX ADMIN — Medication SCH PACKET: at 21:00

## 2022-12-25 RX ADMIN — DOCUSATE SODIUM SCH MG: 100 CAPSULE, LIQUID FILLED ORAL at 07:59

## 2022-12-25 RX ADMIN — NICOTINE POLACRILEX PRN MG: 2 LOZENGE ORAL at 18:42

## 2022-12-25 RX ADMIN — BENZTROPINE MESYLATE SCH MG: 1 TABLET ORAL at 07:58

## 2022-12-25 RX ADMIN — NICOTINE POLACRILEX PRN MG: 2 LOZENGE ORAL at 09:35

## 2022-12-25 RX ADMIN — THERA TABS SCH EACH: TAB at 07:59

## 2022-12-25 RX ADMIN — Medication SCH CAN: at 13:00

## 2022-12-25 RX ADMIN — BUPRENORPHINE AND NALOXONE SCH TAB: 2; .5 TABLET SUBLINGUAL at 07:59

## 2022-12-25 RX ADMIN — Medication SCH CAN: at 07:59

## 2022-12-25 NOTE — NUR
Nursing Progress Note:    



Problem : 

Per 5150 pt. is here for SA by OD on street drugs after command hallucinations telling him 
to do so. Pt. states that he took "Fentanyl, black china, meth, 8 ball" in suicide attempt.



Interventions : 

Maintained a safe and supportive environment, ensured contract for safety, provided clear 
and simple instructions, attempted to orient to reality, monitored behavior and provided 
intervention as needed, provided active listening and positive encouragement, encouraged 
independent performance of ADLs, and maintained Q 15min safety checks. 



Response : 

Received Pt in bed sleeping w/o distress. Pt woke and was cooperative with vitals and 
returned to sleeping. Pt woke again for breakfast and ate well, and took AM meds w/o issue. 
Pt talkative this morning and pacing a bit. Pt thankful for staff and it being [facility]. 
Able to socialize appropriately with other Pts. Pt became tearful while watching [place] 
[place] game which reminded him of a cousin who overdosed on fentanyl in [place]. Pt sad 
talking about his son. He talked about getting out and getting a job and school. He also 
made statements about shooting himself with increased anxiety. Pt able to calm with pacing 
and talking with staff. Pt napped in late afternoon. Pt not aggressive toward others this 
shift. 



Plan :

 Per Dr. Davalos, pt. continues to require a safe and supportive environment. Pt has been 
conserved and awaiting placement by [facility].

## 2022-12-25 NOTE — NUR
Nursing Progress Note 



Problem: 

Per 5150 pt. is here for SA by OD on street drugs after command hallucinations telling him 
to do so. Pt. states that he took "Fentanyl, black china, meth, 8 ball" in suicide attempt.





Interventions: 

Maintained a safe and supportive environment, ensured contract for safety, provided clear 
and simple instructions, attempted to orient to reality, monitored behavior and provided 
intervention as needed, provided active listening and positive encouragement, encouraged 
independent performance of ADLs, and maintained Q 15min safety checks. 



Response: Patient was found pacing hallways at change of shift. Patient came up to nurse 
stating he was feeling happier since waking up. Patient said he was upset earlier due to him 
trying to call his mother multiple times and she's ignoring him. He hasn't seen his son in 
years and he was hoping see pictures of his son that his mother gets sent. Patient retuned 
to pacing the floor he had got in 30 laps earlier and he wanted to get in 20 more laps. 
Patient participated in snack time and went to bed. Patient was awaken to be given night 
medications, which he took without difficulty and retuned to bed.   





Plan:Per provider, pt. continues to require a safe and supportive environment.

## 2022-12-26 VITALS — DIASTOLIC BLOOD PRESSURE: 84 MMHG | SYSTOLIC BLOOD PRESSURE: 154 MMHG

## 2022-12-26 RX ADMIN — NICOTINE SCH PATCH: 21 PATCH, EXTENDED RELEASE TRANSDERMAL at 08:29

## 2022-12-26 RX ADMIN — POLYETHYLENE GLYCOL 3350 SCH GM: 17 POWDER, FOR SOLUTION ORAL at 20:08

## 2022-12-26 RX ADMIN — DIVALPROEX SODIUM SCH MG: 250 TABLET, DELAYED RELEASE ORAL at 20:08

## 2022-12-26 RX ADMIN — BENZTROPINE MESYLATE SCH MG: 1 TABLET ORAL at 20:09

## 2022-12-26 RX ADMIN — Medication SCH PACKET: at 20:28

## 2022-12-26 RX ADMIN — BUPRENORPHINE AND NALOXONE SCH TAB: 2; .5 TABLET SUBLINGUAL at 16:30

## 2022-12-26 RX ADMIN — DOCUSATE SODIUM SCH MG: 100 CAPSULE, LIQUID FILLED ORAL at 08:27

## 2022-12-26 RX ADMIN — BENZOCAINE PRN APPLIC: 200 SOLUTION TOPICAL at 12:18

## 2022-12-26 RX ADMIN — DIVALPROEX SODIUM SCH MG: 250 TABLET, DELAYED RELEASE ORAL at 08:28

## 2022-12-26 RX ADMIN — Medication SCH CAN: at 08:00

## 2022-12-26 RX ADMIN — BENZOCAINE PRN APPLIC: 200 SOLUTION TOPICAL at 18:00

## 2022-12-26 RX ADMIN — Medication SCH CAN: at 13:00

## 2022-12-26 RX ADMIN — DOCUSATE SODIUM SCH MG: 100 CAPSULE, LIQUID FILLED ORAL at 20:08

## 2022-12-26 RX ADMIN — BENZTROPINE MESYLATE SCH MG: 1 TABLET ORAL at 08:27

## 2022-12-26 RX ADMIN — Medication SCH CAN: at 17:44

## 2022-12-26 RX ADMIN — THERA TABS SCH EACH: TAB at 08:27

## 2022-12-26 RX ADMIN — BUPRENORPHINE AND NALOXONE SCH TAB: 2; .5 TABLET SUBLINGUAL at 08:27

## 2022-12-26 NOTE — NUR
Nursing Progress Note: Mike



Problem : 

Per 5150 pt. is here for SA by OD on street drugs after command hallucinations telling him 
to do so. Pt. states that he took "Fentanyl, black china, meth, 8 ball" in suicide attempt.



Interventions : 

Maintained a safe and supportive environment, ensured contract for safety, provided clear 
and simple instructions, attempted to orient to reality, monitored behavior and provided 
intervention as needed, provided active listening and positive encouragement, encouraged 
independent performance of ADLs, and maintained Q 15min safety checks. 



Response : 

Received Pt pacing the unit, c/o being depressed and wanting something to help his anxiety.  
2MG Klonopin given with good effect.  Pt denied AH, was calm and cooperative with 
assessment.  Pt c/o of tooth pain, PRN Tylenol given with good effect. Pt participated in 
snacks and took all HS medications without issue.  Pt to bed shortly after med pass without 
any other complaints or needs. 



Plan :

 Per Dr. Davalos, pt. continues to require a safe and supportive environment. Pt has been 
conserved and awaiting placement by [facility].

## 2022-12-26 NOTE — NUR
Per dietary Ensure Enlive was ordered TID despite already being ordered in EMR and meal 
order was discontinued. TC to [facility], d/w nursing aide regarding patient's diet order and 
appropriate ONS ordering. Per aide pt not actually eating 100% of meals as documented. 
Nursing aide states pt continues with a toothache so PO intake of meals has declined. RD 
strongly encouraged discontinuing ONS IF PO intake of meals improves d/t ongoing product 
shortage and can send additional food on meal trays for satiety if needed rather than ONS. 
Also informed aide that Ensure Enlive is currently out of stock so it will be substituted 
with Ensure Plus HP. Per nursing aide pt on a vegetarian diet per pt request. Pt no longer 
to receive double protein with meals to prevent waste as pt not truly eating all of meal 
trays. [facility] 12/26. Will continue to follow.

Recommendations:

1) Continue Vegetarian diet per pt request

2) Discontinue ENSURE TID if PO intake of meals improves; sub Ensure Plus HP given product 
shortage

4) MVI supplementation per MD

5) Bowel care PRN

6) Weekly scaled weights

-------------------------------------------------------------------------------

Addendum: 12/26/22 at 1524 by Venita Choudhary RD

-------------------------------------------------------------------------------

Amended: Links added.

## 2022-12-26 NOTE — NUR
Nursing Progress Note:



Problem:  Per 5150 pt. is here for SA by OD on street drugs after command hallucinations 
telling him to do so. Pt. states that he took "Fentanyl, black china, meth, 8 ball" in 
suicide attempt.



Interventions:  Maintained a safe and supportive environment, ensured contract for safety, 
provided clear and simple instructions, attempted to orient to reality, monitored behavior 
and provided intervention as needed, provided active listening and positive encouragement, 
encouraged independent performance of ADLs, and maintained Q 15min safety checks. 



Response: Patient wakes and goes to the community room for breakfast. Hes compliant with 
medications, and barely consumes anything before he starts walking. Patient walks all 
morning building his anger until he starts talking about the devil and scaring his peers. He 
was given oral B52 at ~1330, which has little effect. His evil speech continues along with 
SI. He also sprinkles in some HI. Patient states that he was raised being taught by his 
mother that the Devil is all powerful; even more powerful than GOD. He was taught that 
worshipping the Devil is more beneficial than worshipping GOD. Because he doesnt want to be 
evil, the patient has a strong spiritual coronado going on inside him.



Plan: Per provider, pt. continues to require a safe and supportive environment. 
Conservatorship is recommended.

## 2022-12-27 VITALS — SYSTOLIC BLOOD PRESSURE: 110 MMHG | DIASTOLIC BLOOD PRESSURE: 66 MMHG

## 2022-12-27 VITALS — SYSTOLIC BLOOD PRESSURE: 106 MMHG | DIASTOLIC BLOOD PRESSURE: 82 MMHG

## 2022-12-27 VITALS — DIASTOLIC BLOOD PRESSURE: 82 MMHG | SYSTOLIC BLOOD PRESSURE: 106 MMHG

## 2022-12-27 RX ADMIN — BENZOCAINE PRN APPLIC: 200 SOLUTION TOPICAL at 16:07

## 2022-12-27 RX ADMIN — BENZOCAINE PRN APPLIC: 200 SOLUTION TOPICAL at 18:03

## 2022-12-27 RX ADMIN — Medication SCH CAN: at 13:54

## 2022-12-27 RX ADMIN — BUPRENORPHINE AND NALOXONE SCH TAB: 2; .5 TABLET SUBLINGUAL at 07:37

## 2022-12-27 RX ADMIN — Medication SCH CAN: at 18:02

## 2022-12-27 RX ADMIN — THERA TABS SCH EACH: TAB at 07:44

## 2022-12-27 RX ADMIN — BENZTROPINE MESYLATE SCH MG: 1 TABLET ORAL at 07:38

## 2022-12-27 RX ADMIN — DIVALPROEX SODIUM SCH MG: 250 TABLET, DELAYED RELEASE ORAL at 20:09

## 2022-12-27 RX ADMIN — MAGNESIUM HYDROXIDE PRN ML: 400 SUSPENSION ORAL at 16:16

## 2022-12-27 RX ADMIN — POLYETHYLENE GLYCOL 3350 SCH GM: 17 POWDER, FOR SOLUTION ORAL at 20:10

## 2022-12-27 RX ADMIN — DIVALPROEX SODIUM SCH MG: 250 TABLET, DELAYED RELEASE ORAL at 07:44

## 2022-12-27 RX ADMIN — DOCUSATE SODIUM SCH MG: 100 CAPSULE, LIQUID FILLED ORAL at 07:37

## 2022-12-27 RX ADMIN — DIVALPROEX SODIUM SCH MG: 250 TABLET, DELAYED RELEASE ORAL at 12:11

## 2022-12-27 RX ADMIN — Medication SCH PACKET: at 20:10

## 2022-12-27 RX ADMIN — NICOTINE POLACRILEX PRN MG: 2 LOZENGE ORAL at 16:16

## 2022-12-27 RX ADMIN — DOCUSATE SODIUM SCH MG: 100 CAPSULE, LIQUID FILLED ORAL at 20:08

## 2022-12-27 RX ADMIN — BENZOCAINE PRN APPLIC: 200 SOLUTION TOPICAL at 10:41

## 2022-12-27 RX ADMIN — NICOTINE SCH PATCH: 21 PATCH, EXTENDED RELEASE TRANSDERMAL at 07:36

## 2022-12-27 RX ADMIN — Medication SCH CAN: at 08:07

## 2022-12-27 RX ADMIN — BUPRENORPHINE AND NALOXONE SCH TAB: 2; .5 TABLET SUBLINGUAL at 15:04

## 2022-12-27 RX ADMIN — BENZTROPINE MESYLATE SCH MG: 1 TABLET ORAL at 20:10

## 2022-12-27 NOTE — NUR
Nursing Progress Note:



Problem:  Per 5150 pt. is here for SA by OD on street drugs after command hallucinations 
telling him to do so. Pt. states that he took "Fentanyl, black china, meth, 8 ball" in 
suicide attempt.



Interventions:  Maintained a safe and supportive environment, ensured contract for safety, 
provided clear and simple instructions, attempted to orient to reality, monitored behavior 
and provided intervention as needed, provided active listening and positive encouragement, 
encouraged independent performance of ADLs, and maintained Q 15min safety checks. 



Response: Received patient sleeping in bed at shift change.  Patient awakens before 
breakfast and takes his medications as directed.  Patient is complaining of being here so 
long, and how can he get out of here. Patient complaining of acute tooth pain. M.D. examined 
patients mouth and found nothing remarkable. Patient has a dentist appointment Thursday.  
Patient paces the hallways most of the day. Patient requested a Klonopin early because he 
was feeling agitated. Patients Suboxone was increased to 2 tablets, due to tooth pain. 
Patient received Milk of Magnesia for constipation. Nicotine lozenges for cravings.





Plan: Per provider, pt. continues to require a safe and supportive environment. 
Conservatorship is recommended.

## 2022-12-27 NOTE — NUR
Nursing Progress Note: Mike



Problem:  Per 5150 pt. is here for SA by OD on street drugs after command hallucinations 
telling him to do so. Pt. states that he took "Fentanyl, black china, meth, 8 ball" in 
suicide attempt.



Interventions:  Maintained a safe and supportive environment, ensured contract for safety, 
provided clear and simple instructions, attempted to orient to reality, monitored behavior 
and provided intervention as needed, provided active listening and positive encouragement, 
encouraged independent performance of ADLs, and maintained Q 15min safety checks. 



Response: Patient pacing the unit, mood elevated and states he is not going to sleep 
tonight.  He and other peers are pacing the unit, talking loudly, in and out of the rec room 
watching football.  Pt c/o of tooth pain, provider notified and ABX ordered and given.   Pt 
up for snacks, HS medications given.  Pt wanted to move rooms and did so without complaint. 
Pt went to bed shortly after med pass.  



Plan: Per provider, pt. continues to require a safe and supportive environment. 
Conservatorship is recommended.

## 2022-12-28 VITALS — DIASTOLIC BLOOD PRESSURE: 47 MMHG | SYSTOLIC BLOOD PRESSURE: 77 MMHG

## 2022-12-28 VITALS — DIASTOLIC BLOOD PRESSURE: 77 MMHG | SYSTOLIC BLOOD PRESSURE: 132 MMHG

## 2022-12-28 RX ADMIN — Medication SCH CAN: at 08:36

## 2022-12-28 RX ADMIN — Medication SCH CAN: at 12:37

## 2022-12-28 RX ADMIN — DIVALPROEX SODIUM SCH MG: 250 TABLET, DELAYED RELEASE ORAL at 07:58

## 2022-12-28 RX ADMIN — DIVALPROEX SODIUM SCH MG: 250 TABLET, DELAYED RELEASE ORAL at 19:43

## 2022-12-28 RX ADMIN — Medication SCH CAN: at 18:14

## 2022-12-28 RX ADMIN — DOCUSATE SODIUM SCH MG: 100 CAPSULE, LIQUID FILLED ORAL at 08:00

## 2022-12-28 RX ADMIN — DIVALPROEX SODIUM SCH MG: 250 TABLET, DELAYED RELEASE ORAL at 12:37

## 2022-12-28 RX ADMIN — BUPRENORPHINE AND NALOXONE SCH TAB: 2; .5 TABLET SUBLINGUAL at 15:26

## 2022-12-28 RX ADMIN — NICOTINE SCH PATCH: 21 PATCH, EXTENDED RELEASE TRANSDERMAL at 08:00

## 2022-12-28 RX ADMIN — BENZTROPINE MESYLATE SCH MG: 1 TABLET ORAL at 19:44

## 2022-12-28 RX ADMIN — DOCUSATE SODIUM SCH MG: 100 CAPSULE, LIQUID FILLED ORAL at 19:42

## 2022-12-28 RX ADMIN — Medication SCH PACKET: at 19:44

## 2022-12-28 RX ADMIN — THERA TABS SCH EACH: TAB at 08:00

## 2022-12-28 RX ADMIN — POLYETHYLENE GLYCOL 3350 SCH GM: 17 POWDER, FOR SOLUTION ORAL at 19:44

## 2022-12-28 RX ADMIN — ALUMINUM HYDROXIDE, MAGNESIUM HYDROXIDE, AND SIMETHICONE PRN ML: 200; 200; 20 SUSPENSION ORAL at 16:18

## 2022-12-28 RX ADMIN — BUPRENORPHINE AND NALOXONE SCH TAB: 2; .5 TABLET SUBLINGUAL at 07:59

## 2022-12-28 RX ADMIN — BENZTROPINE MESYLATE SCH MG: 1 TABLET ORAL at 07:59

## 2022-12-28 NOTE — NUR
Nursing Progress Note:



Problem:  Per 5150 pt. is here for SA by OD on street drugs after command hallucinations 
telling him to do so. Pt. states that he took "Fentanyl, black china, meth, 8 ball" in 
suicide attempt.



Interventions:  Maintained a safe and supportive environment, ensured contract for safety, 
provided clear and simple instructions, attempted to orient to reality, monitored behavior 
and provided intervention as needed, provided active listening and positive encouragement, 
encouraged independent performance of ADLs, and maintained Q 15min safety checks. 



Response:  Patient sleeping in bed at change of shift.  Patient awakens before breakfast and 
takes his medications without incident. Patient requested Klonopin for agitation.  Patients 
main concern is his tooth pain and finding pain relief. Patient paced the hallways for most 
of the day.  At 16:20, patient started vomiting loudly. Patient believes it is from the 
Suboxone, and doesnt want to take it anymore. Instructed him to talk to provider tomorrow.  
Maalox given for dyspepsia with good result.  Patient has been pleasant and cooperative this 
shift.



Plan: Per provider, pt. continues to require a safe and supportive environment. 
Conservatorship is recommended.

## 2022-12-28 NOTE — NUR
Nursing Progress Note: Mike



Problem:  Per 5150 pt. is here for SA by OD on street drugs after command hallucinations 
telling him to do so. Pt. states that he took "Fentanyl, black china, meth, 8 ball" in 
suicide attempt.



Interventions:  Maintained a safe and supportive environment, ensured contract for safety, 
provided clear and simple instructions, attempted to orient to reality, monitored behavior 
and provided intervention as needed, provided active listening and positive encouragement, 
encouraged independent performance of ADLs, and maintained Q 15min safety checks. 



Response:

Received patient walking the halls at change of shift. Soon after, patient is observed 
asleep in bed .He woke up for vitals. Took all bedtime medications with no problems. 
Nicotine patch was removed. Patient went back to sleep. Slept through the night.



Plan: Per provider, pt. continues to require a safe and supportive environment. 
Conservatorship is recommended.

## 2022-12-29 VITALS — DIASTOLIC BLOOD PRESSURE: 90 MMHG | SYSTOLIC BLOOD PRESSURE: 141 MMHG

## 2022-12-29 VITALS — DIASTOLIC BLOOD PRESSURE: 61 MMHG | SYSTOLIC BLOOD PRESSURE: 102 MMHG

## 2022-12-29 VITALS — SYSTOLIC BLOOD PRESSURE: 158 MMHG | DIASTOLIC BLOOD PRESSURE: 109 MMHG

## 2022-12-29 VITALS — DIASTOLIC BLOOD PRESSURE: 69 MMHG | SYSTOLIC BLOOD PRESSURE: 109 MMHG

## 2022-12-29 RX ADMIN — Medication SCH PACKET: at 21:00

## 2022-12-29 RX ADMIN — DIVALPROEX SODIUM SCH MG: 250 TABLET, DELAYED RELEASE ORAL at 20:08

## 2022-12-29 RX ADMIN — Medication SCH CAN: at 18:00

## 2022-12-29 RX ADMIN — THERA TABS SCH EACH: TAB at 08:10

## 2022-12-29 RX ADMIN — DOCUSATE SODIUM SCH MG: 100 CAPSULE, LIQUID FILLED ORAL at 20:00

## 2022-12-29 RX ADMIN — BUPRENORPHINE AND NALOXONE SCH TAB: 2; .5 TABLET SUBLINGUAL at 12:42

## 2022-12-29 RX ADMIN — DIVALPROEX SODIUM SCH MG: 250 TABLET, DELAYED RELEASE ORAL at 12:07

## 2022-12-29 RX ADMIN — BUPRENORPHINE AND NALOXONE SCH TAB: 2; .5 TABLET SUBLINGUAL at 08:11

## 2022-12-29 RX ADMIN — BENZTROPINE MESYLATE SCH MG: 1 TABLET ORAL at 20:07

## 2022-12-29 RX ADMIN — POLYETHYLENE GLYCOL 3350 SCH GM: 17 POWDER, FOR SOLUTION ORAL at 21:00

## 2022-12-29 RX ADMIN — BENZTROPINE MESYLATE SCH MG: 1 TABLET ORAL at 08:10

## 2022-12-29 RX ADMIN — DOCUSATE SODIUM SCH MG: 100 CAPSULE, LIQUID FILLED ORAL at 08:10

## 2022-12-29 RX ADMIN — DIVALPROEX SODIUM SCH MG: 250 TABLET, DELAYED RELEASE ORAL at 08:10

## 2022-12-29 RX ADMIN — NICOTINE SCH PATCH: 21 PATCH, EXTENDED RELEASE TRANSDERMAL at 08:14

## 2022-12-29 RX ADMIN — Medication SCH CAN: at 08:11

## 2022-12-29 RX ADMIN — Medication SCH CAN: at 13:00

## 2022-12-29 NOTE — NUR
Pt was givem !%00 

-------------------------------------------------------------------------------

Addendum: 12/29/22 at 1524 by Elgin Cool RN

-------------------------------------------------------------------------------

Disregard original note.

Pt was given 1500 Suboxone before leaving for Dentist appt., per verbal order from Dr Escobar, as it was prior to its scheduled administration.

## 2022-12-29 NOTE — NUR
DENTAL APPT



Received call from [facility] that Mike had xrays and exam completed today, but 
needs prior authorization for deep cleaning, new crowns, extractions (wisdom teeth), and 
fillings. Prior authorization will take approximately 3 weeks. Gave [facility]'s phone number to schedule next appointment. 



The dentist recommended Ibuprofen 600 mg TID, but that he doesn't need antibiotics.

## 2022-12-29 NOTE — NUR
Nursing Progress Note: Mike



Problem:  Per 5150 pt. is here for SA by OD on street drugs after command hallucinations 
telling him to do so. Pt. states that he took "Fentanyl, black china, meth, 8 ball" in 
suicide attempt.



Interventions:  Maintained a safe and supportive environment, ensured contract for safety, 
provided clear and simple instructions, attempted to orient to reality, monitored behavior 
and provided intervention as needed, provided active listening and positive encouragement, 
encouraged independent performance of ADLs, and maintained Q 15min safety checks. 



Response:

Received patient awake at shift change. Patient observed walking around with roommate 
talking . During snack patient became agitated d/t being easily influenced by roommate to 
act out. Patient was agitated during medication administration and it took some encouraging 
to get him to take medications. 1:1 assessment patient denies SI,HI, AH, VH. patient went 
back to room and fell asleep shortly after snack.



Plan: Per provider, pt. continues to require a safe and supportive environment. 
Conservatorship is recommended.

## 2022-12-29 NOTE — NUR
Nursing Progress Note:       



Problem : 

Per 5150 pt. is here for SA by OD on street drugs after command hallucinations telling him 
to do so. Pt. states that he took "Fentanyl, black china, meth, 8 ball" in suicide attempt.



Interventions : 

Maintained a safe and supportive environment, ensured contract for safety, provided clear 
and simple instructions, attempted to orient to reality, monitored behavior and provided 
intervention as needed, provided active listening and positive encouragement, encouraged 
independent performance of ADLs, and maintained Q 15min safety checks. 



Response : 

Received Pt in bed sleeping w/o distress at change of shift. Pt woke and was cooperative 
with vitals and paced halls. Pts tooth hurt so he only drank ensure for breakfast, yet 
later ate chips for snack. He took AM meds w/o issue and received PRN for anxiety and 
Tylenol for pain. He talked about getting money from his mom for [place] I didnt think 
she cared, but she does. Pt cooperative with assessments and denies SI/HI and AHs at this 
time. Pt was picked up by [facility] public guardian to go to dentist apt. Pt was given 
1500 Suboxone before he left per verbal order from Dr Escobar. Pt has not returned from 
dentist apt. as of the time of this note.



Plan :

Per Dr. Davalos, pt. continues to require a safe and supportive environment. Pt has been 
conserved and awaiting placement by [facility].

## 2022-12-30 VITALS — DIASTOLIC BLOOD PRESSURE: 75 MMHG | SYSTOLIC BLOOD PRESSURE: 119 MMHG

## 2022-12-30 VITALS — DIASTOLIC BLOOD PRESSURE: 33 MMHG | SYSTOLIC BLOOD PRESSURE: 97 MMHG

## 2022-12-30 VITALS — SYSTOLIC BLOOD PRESSURE: 132 MMHG | DIASTOLIC BLOOD PRESSURE: 92 MMHG

## 2022-12-30 RX ADMIN — Medication SCH PACKET: at 20:49

## 2022-12-30 RX ADMIN — Medication SCH CAN: at 08:00

## 2022-12-30 RX ADMIN — NICOTINE SCH PATCH: 21 PATCH, EXTENDED RELEASE TRANSDERMAL at 08:05

## 2022-12-30 RX ADMIN — BENZTROPINE MESYLATE SCH MG: 1 TABLET ORAL at 08:03

## 2022-12-30 RX ADMIN — ALUMINUM HYDROXIDE, MAGNESIUM HYDROXIDE, AND SIMETHICONE PRN ML: 200; 200; 20 SUSPENSION ORAL at 12:11

## 2022-12-30 RX ADMIN — BUPRENORPHINE AND NALOXONE SCH TAB: 2; .5 TABLET SUBLINGUAL at 08:06

## 2022-12-30 RX ADMIN — DIVALPROEX SODIUM SCH MG: 250 TABLET, DELAYED RELEASE ORAL at 08:04

## 2022-12-30 RX ADMIN — THERA TABS SCH EACH: TAB at 08:04

## 2022-12-30 RX ADMIN — DOCUSATE SODIUM SCH MG: 100 CAPSULE, LIQUID FILLED ORAL at 20:48

## 2022-12-30 RX ADMIN — POLYETHYLENE GLYCOL 3350 SCH GM: 17 POWDER, FOR SOLUTION ORAL at 20:52

## 2022-12-30 RX ADMIN — BUPRENORPHINE AND NALOXONE SCH TAB: 2; .5 TABLET SUBLINGUAL at 15:56

## 2022-12-30 RX ADMIN — DOCUSATE SODIUM SCH MG: 100 CAPSULE, LIQUID FILLED ORAL at 08:03

## 2022-12-30 RX ADMIN — BENZTROPINE MESYLATE SCH MG: 1 TABLET ORAL at 20:48

## 2022-12-30 RX ADMIN — DIVALPROEX SODIUM SCH MG: 250 TABLET, DELAYED RELEASE ORAL at 11:13

## 2022-12-30 RX ADMIN — Medication SCH CAN: at 13:12

## 2022-12-30 RX ADMIN — Medication SCH CAN: at 18:08

## 2022-12-30 RX ADMIN — DIVALPROEX SODIUM SCH MG: 250 TABLET, DELAYED RELEASE ORAL at 20:49

## 2022-12-30 NOTE — NUR
Pt. let out of physical restraints after education provided by staff regarding appropriate 
behaviors on the unit. He reported understanding and apologized for his previous behaviors, 
will continue to monitor pt. closely.

## 2022-12-30 NOTE — NUR
Nursing Progress Note:       



Problem : 

Per 5150 pt. is here for SA by OD on street drugs after command hallucinations telling him 
to do so. Pt. states that he took "Fentanyl, black china, meth, 8 ball" in suicide attempt.



Interventions : 

Maintained a safe and supportive environment, ensured contract for safety, provided clear 
and simple instructions, attempted to orient to reality, monitored behavior and provided 
intervention as needed, provided active listening and positive encouragement, encouraged 
independent performance of ADLs, and maintained Q 15min safety checks. Patient given 
chemical restrain at 2040 due to loud out burst and treating self and others. Patient was 
placed into physical restraint at 0403 and placed into restrains again at 0512 due to 
screaming and banging on windows. 



Response : Patient arrived on unit at 2005. Patient was agitated and yelling due to teeth 
not being pulled and being in pain. Security had to be called due to patients refusal to 
calm down. Patient was talking very fast and not making nonsensical speech. [Patient then 
began to threatened to harm staff. Patient refused all night medications. Patient contained 
to elevate behavior. PNICOLE ibanez was called and B52 was ordered patient took B52 with out issue 
as well as prn Ativan.Over the next couple of hours patient was convinced to take rest of 
schedule night medications except for stool softeners and laxatives. Patient began to pace 
singh with  to help him calm down and eventfully nurse walked with patient. 
Patient was then given prn Klonopin due to him continue to complain of anxiety and fanning 
himself.  Patient started to have slurred speech and missteps. Patient eventually sat down 
and ate some snacks. Patient finally agreed to lay down at 2100. 



Patient slept until 0230 and got up complain about tooth pain. Patient was given prn Tylenol 
at 0235. Patient continued to complain becoming more agitated and began yelling down the 
hallways. Patient was encouraged to lower voice and was offered abserol for tooth pain. 
Patient initially refused and eventually took prn. Patient continued to gt lower and kept 
stating he should have escape while he had the chance. Patient says next time he is et out 
he will make a run for it. Patient then started to treated staff and security was called 
again the for the four time. Prn Haldol was pulled and patient initially refused stating he 
was no longer taking medications. After offering headphones and patient resting in chair he 
agreed to take medications at 0345 and said he would lay down now. Patient laid down for 5 
minutes then popped back up and returned to pacing hallways yelling. When patient was 
informed he would be placed in restraints if he didn't go back to bed he then began to 
through punches at staff and treated that his gang would come and slit everyone thoughts. 



Patient was placed in physical restraint at 0403 With order from Dr. Perla. Patient was taken 
out at 0430. Patient continued to pace and sit in room. Patient began yelling again and 
screaming , patient also started banging on glass and screaming insults at staff. Patient 
was asked multiple times to calm down and patient only got louder. Security was called again 
and patient was replaced in restraints at 0410 , patient continues to be in restraints at 
this time with one on one monitoring. Patient continues to scream at staff. 



Plan :

Per Dr. Davalos, pt. continues to require a safe and supportive environment. Pt has been 
conserved and awaiting placement by [facility].

## 2022-12-30 NOTE — NUR
Nursing Progress Note:



Problem : Per 5150 pt. is here for SA by OD on street drugs after command hallucinations 
telling him to do so. Pt. states that he took "Fentanyl, black china, meth, 8 ball" in 
suicide attempt. Pt. states he feels hopeless. Pt. is also delusional, stating, "they won't 
give me my billion dollars". Pt. states, "I tried killing myself because Im a free renetta. 
They dont love me but I love them. Yes I do."



Interventions : Maintained a safe and supportive environment, ensured contract for safety, 
provided clear and simple instructions, monitored behavior and provided intervention as 
needed, provided active listening and positive encouragement,and maintained Q 15min safety 
checks. 



Response : Received pt. in restraints in the Observation Room at the beginning of the shift 
(see previous note). Pt. was able to contract for the safety of himself and others, however 
he continues to present as restless and is labile at intervals. Pt. continues to slur his 
words together and is difficult to understand; his thought process remains somewhat 
disorganized. Pt. perseverates on his teeth and his ongoing desire to discharge. He states 
irritably, "They might tear all my teeth out! It's from the meth, chewing, and soda." This 
writer provided active listening and positive encouragement and pt. reported some 
contentment. A short time later, pt. approached this writer and stated in an animated 
manner, "God bless you!" He continued to present with labile episodes throughout the shift, 
however no episodes of increased agitation were exhibited, and pt. was able be redirected as 
needed. Pt. was observed to be interacting appropriately with his peers.

In the afternoon, pt. reported upper abdominal pain which appeared to be indigestion. His 
V/S were obtained and were WNL. Pt. was provided with PRN Maalox and a snack with 
effectiveness. Pt. was able to nap at intervals throughout the shift. He continues to report 
chronic tooth pain and PRN Tylenol was administered with effectiveness. 



Plan :Pt. continues to require a safe and supportive environment while awaiting placement.

## 2022-12-31 VITALS — SYSTOLIC BLOOD PRESSURE: 121 MMHG | DIASTOLIC BLOOD PRESSURE: 56 MMHG

## 2022-12-31 VITALS — DIASTOLIC BLOOD PRESSURE: 60 MMHG | SYSTOLIC BLOOD PRESSURE: 96 MMHG

## 2022-12-31 RX ADMIN — POLYETHYLENE GLYCOL 3350 SCH GM: 17 POWDER, FOR SOLUTION ORAL at 20:20

## 2022-12-31 RX ADMIN — Medication SCH CAN: at 18:00

## 2022-12-31 RX ADMIN — DOCUSATE SODIUM SCH MG: 100 CAPSULE, LIQUID FILLED ORAL at 07:51

## 2022-12-31 RX ADMIN — BENZTROPINE MESYLATE SCH MG: 1 TABLET ORAL at 20:23

## 2022-12-31 RX ADMIN — BUPRENORPHINE AND NALOXONE SCH TAB: 2; .5 TABLET SUBLINGUAL at 07:52

## 2022-12-31 RX ADMIN — Medication SCH PACKET: at 20:21

## 2022-12-31 RX ADMIN — DOCUSATE SODIUM SCH MG: 100 CAPSULE, LIQUID FILLED ORAL at 20:22

## 2022-12-31 RX ADMIN — NICOTINE SCH PATCH: 21 PATCH, EXTENDED RELEASE TRANSDERMAL at 07:52

## 2022-12-31 RX ADMIN — DIVALPROEX SODIUM SCH MG: 250 TABLET, DELAYED RELEASE ORAL at 20:22

## 2022-12-31 RX ADMIN — BENZTROPINE MESYLATE SCH MG: 1 TABLET ORAL at 07:51

## 2022-12-31 RX ADMIN — DIVALPROEX SODIUM SCH MG: 250 TABLET, DELAYED RELEASE ORAL at 07:51

## 2022-12-31 RX ADMIN — THERA TABS SCH EACH: TAB at 07:51

## 2022-12-31 RX ADMIN — Medication SCH CAN: at 08:00

## 2022-12-31 RX ADMIN — Medication SCH CAN: at 13:00

## 2022-12-31 RX ADMIN — BUPRENORPHINE AND NALOXONE SCH TAB: 2; .5 TABLET SUBLINGUAL at 15:14

## 2022-12-31 RX ADMIN — DIVALPROEX SODIUM SCH MG: 250 TABLET, DELAYED RELEASE ORAL at 12:19

## 2022-12-31 RX ADMIN — NICOTINE SCH PATCH: 21 PATCH, EXTENDED RELEASE TRANSDERMAL at 08:00

## 2022-12-31 NOTE — NUR
RN PROGRESS NOTE:



LEGAL HOLD:  [facility] for DTS/GD



REASON FOR ADMIT:  Client was admitted for making bizarre statements and behavior.  Client 
reported delusions and command hallucinations.



INTERVENTIONS:  Q 15 min checks for safety.  1:1 assessments.  Administer medications and 
monitor for side effects.



THIS SHIFT:  Client slept until PM medications.  Client was cooperative and took all PM 
meds.  Reported tooth pain and constipation.  Client was unable to state when he had his 
last BM.   Client took Miralax, Metamucil, and colace.  He later requested 650 mg Tylenol 
Tab PO for tooth pain.  Clients' affect is depressed.  He got up briefly and walked in the 
singh.  He was mildly sedated.  His primary concerns are tooth pain and his discharge plan.  
Client fell asleep w/o difficulty.

## 2022-12-31 NOTE — NUR
Nursing Progress Note:



Problem: Per 5150 pt. is here for SA by OD on street drugs after command hallucinations 
telling him to do so. Pt. states that he took "Fentanyl, black china, meth, 8 ball" in 
suicide attempt. Pt. states he feels hopeless. Pt. is also delusional, stating, "they won't 
give me my billion dollars". Pt. states, "I tried killing myself because Im a free renetta. 
They dont love me but I love them. Yes I do."



Interventions: Maintained a safe and supportive environment, ensured contract for safety, 
provided clear and simple instructions, observed for changes in usual behavior that may 
indicate increased suicide risk, provided medications as ordered, provided education on 
medications as needed, maintained Q 15min safety checks. 



Response: Pt slept through most of the shift. He took his medications as prescribed and 
asked to "hold off on his nicotine patch, I'm tired and I'm sleeping. 



Plan: Pt. continues to require a safe and supportive environment while awaiting placement.

## 2023-01-01 VITALS — SYSTOLIC BLOOD PRESSURE: 100 MMHG | DIASTOLIC BLOOD PRESSURE: 65 MMHG

## 2023-01-01 VITALS — SYSTOLIC BLOOD PRESSURE: 93 MMHG | DIASTOLIC BLOOD PRESSURE: 59 MMHG

## 2023-01-01 RX ADMIN — Medication SCH CAN: at 18:00

## 2023-01-01 RX ADMIN — BENZTROPINE MESYLATE SCH MG: 1 TABLET ORAL at 08:11

## 2023-01-01 RX ADMIN — DOCUSATE SODIUM SCH MG: 100 CAPSULE, LIQUID FILLED ORAL at 20:04

## 2023-01-01 RX ADMIN — BUPRENORPHINE AND NALOXONE SCH TAB: 2; .5 TABLET SUBLINGUAL at 08:12

## 2023-01-01 RX ADMIN — Medication SCH CAN: at 08:15

## 2023-01-01 RX ADMIN — POLYETHYLENE GLYCOL 3350 SCH GM: 17 POWDER, FOR SOLUTION ORAL at 20:04

## 2023-01-01 RX ADMIN — Medication SCH PACKET: at 20:04

## 2023-01-01 RX ADMIN — THERA TABS SCH EACH: TAB at 08:13

## 2023-01-01 RX ADMIN — DIVALPROEX SODIUM SCH MG: 250 TABLET, DELAYED RELEASE ORAL at 12:13

## 2023-01-01 RX ADMIN — BUPRENORPHINE AND NALOXONE SCH TAB: 2; .5 TABLET SUBLINGUAL at 15:22

## 2023-01-01 RX ADMIN — Medication SCH CAN: at 13:25

## 2023-01-01 RX ADMIN — NICOTINE SCH PATCH: 21 PATCH, EXTENDED RELEASE TRANSDERMAL at 08:29

## 2023-01-01 RX ADMIN — NICOTINE POLACRILEX PRN MG: 2 LOZENGE ORAL at 12:32

## 2023-01-01 RX ADMIN — DIVALPROEX SODIUM SCH MG: 250 TABLET, DELAYED RELEASE ORAL at 20:02

## 2023-01-01 RX ADMIN — BENZTROPINE MESYLATE SCH MG: 1 TABLET ORAL at 20:03

## 2023-01-01 RX ADMIN — DIVALPROEX SODIUM SCH MG: 250 TABLET, DELAYED RELEASE ORAL at 08:12

## 2023-01-01 RX ADMIN — DOCUSATE SODIUM SCH MG: 100 CAPSULE, LIQUID FILLED ORAL at 08:13

## 2023-01-01 NOTE — NUR
Nursing Progress Note:



Problem: Per 5150 pt. is here for SA by OD on street drugs after command hallucinations 
telling him to do so. Pt. states that he took "Fentanyl, black china, meth, 8 ball" in 
suicide attempt. Pt. states he feels hopeless. Pt. is also delusional, stating, "they won't 
give me my billion dollars". Pt. states, "I tried killing myself because Im a free renetta. 
They dont love me but I love them. Yes I do."



Interventions: Maintained a safe and supportive environment, ensured contract for safety, 
provided clear and simple instructions, observed for changes in usual behavior that may 
indicate increased suicide risk, provided medications as ordered, provided education on 
medications as needed, maintained Q 15min safety checks. 



Response: 1:1 done at bedside, pt. cooperative and took scheduled medications with no 
issues. Pt denies A/VH. Denies S/HI but states, I just dont want to wake up, not because I 
want to be dead but because Im stuck here, even with my good behavior it gets me nowhere. 
Pt encouraged to continue doing what hes been doing and the time will come when placement 
is found. Pt receptive and agreeable to this. Pt attended meals. Klonopin PRN requested for 
anxiety at 215pm. Pt seen napping after this. No behaviors today.     



Plan: Pt. continues to require a safe and supportive environment while awaiting placement.

## 2023-01-01 NOTE — NUR
Nursing Progress Note:



Problem: Per 5150 pt. is here for SA by OD on street drugs after command hallucinations 
telling him to do so. Pt. states that he took "Fentanyl, black china, meth, 8 ball" in 
suicide attempt. Pt. states he feels hopeless. Pt. is also delusional, stating, "they won't 
give me my billion dollars". Pt. states, "I tried killing myself because Im a free renetta. 
They dont love me but I love them. Yes I do."



Interventions: Maintained a safe and supportive environment, ensured contract for safety, 
provided clear and simple instructions, observed for changes in usual behavior that may 
indicate increased suicide risk, provided medications as ordered, provided education on 
medications as needed, maintained Q 15min safety checks. 



Response: Pt was napping at change of shift. Pt woke for vitals and states he is doing ok. 
Denies any tooth pain. Pt states he feels tired and wants to sleep. Pt took HS meds and 
declined a snack. Prazosin held due to low blood pressure. Pt went back to sleep.   



Plan: Pt. continues to require a safe and supportive environment while awaiting placement.

## 2023-01-01 NOTE — NUR
Nursing Progress Note:



Problem: Per 5150 pt. is here for SA by OD on street drugs after command hallucinations 
telling him to do so. Pt. states that he took "Fentanyl, black china, meth, 8 ball" in 
suicide attempt. Pt. states he feels hopeless. Pt. is also delusional, stating, "they won't 
give me my billion dollars". Pt. states, "I tried killing myself because Im a free renetta. 
They dont love me but I love them. Yes I do."



Interventions: Maintained a safe and supportive environment, ensured contract for safety, 
provided clear and simple instructions, observed for changes in usual behavior that may 
indicate increased suicide risk, provided medications as ordered, provided education on 
medications as needed, maintained Q 15min safety checks. 



Response: Patient was found sleeping at beginning of shift. Patient continued to sleep and 
self isolate through out shift. Patient had to be awaken to take night medications and 
retuned to bed. 





Plan: Pt. continues to require a safe and supportive environment while awaiting placement.

## 2023-01-02 VITALS — SYSTOLIC BLOOD PRESSURE: 116 MMHG | DIASTOLIC BLOOD PRESSURE: 53 MMHG

## 2023-01-02 VITALS — DIASTOLIC BLOOD PRESSURE: 59 MMHG | SYSTOLIC BLOOD PRESSURE: 99 MMHG

## 2023-01-02 RX ADMIN — DIVALPROEX SODIUM SCH MG: 250 TABLET, DELAYED RELEASE ORAL at 08:06

## 2023-01-02 RX ADMIN — Medication SCH CAN: at 13:07

## 2023-01-02 RX ADMIN — Medication SCH CAN: at 18:24

## 2023-01-02 RX ADMIN — DIVALPROEX SODIUM SCH MG: 250 TABLET, DELAYED RELEASE ORAL at 20:55

## 2023-01-02 RX ADMIN — NICOTINE SCH PATCH: 21 PATCH, EXTENDED RELEASE TRANSDERMAL at 08:07

## 2023-01-02 RX ADMIN — POLYETHYLENE GLYCOL 3350 SCH GM: 17 POWDER, FOR SOLUTION ORAL at 20:57

## 2023-01-02 RX ADMIN — BUPRENORPHINE AND NALOXONE SCH TAB: 2; .5 TABLET SUBLINGUAL at 08:05

## 2023-01-02 RX ADMIN — Medication SCH CAN: at 08:11

## 2023-01-02 RX ADMIN — DIVALPROEX SODIUM SCH MG: 250 TABLET, DELAYED RELEASE ORAL at 11:07

## 2023-01-02 RX ADMIN — BENZTROPINE MESYLATE SCH MG: 1 TABLET ORAL at 20:56

## 2023-01-02 RX ADMIN — Medication SCH PACKET: at 20:57

## 2023-01-02 RX ADMIN — DOCUSATE SODIUM SCH MG: 100 CAPSULE, LIQUID FILLED ORAL at 08:05

## 2023-01-02 RX ADMIN — THERA TABS SCH EACH: TAB at 08:05

## 2023-01-02 RX ADMIN — DOCUSATE SODIUM SCH MG: 100 CAPSULE, LIQUID FILLED ORAL at 20:54

## 2023-01-02 RX ADMIN — BUPRENORPHINE AND NALOXONE SCH TAB: 2; .5 TABLET SUBLINGUAL at 14:06

## 2023-01-02 RX ADMIN — NICOTINE POLACRILEX PRN MG: 2 LOZENGE ORAL at 17:41

## 2023-01-02 RX ADMIN — BENZTROPINE MESYLATE SCH MG: 1 TABLET ORAL at 08:06

## 2023-01-02 NOTE — NUR
F/u 1/2: Pt PO ~100% most vegetarian diet w/ Ensure Plus High Protein 

TIDWM exceeding estimated energy needs. EMANUEL d/w RN regarding returning to 

double protein portions on meals instead of utilizing ONS to meet needs 

and cancelling ONS if MD agreeable. Per RN, pt exercising more focused on 

trying to lose some wt though likely does not needs Ensure given intake 

hx. Dietary notified to send double protein portions on trays; will 

continue ONS until cancelled in EMR. LBM 12/30 per EMR. No further 

nutrition interventions at this time. Will continue to follow.

Recommendations:

1) Continue Vegetarian diet per pt request; double protein portions TIDWM

2) Discontinue Ensure TID given adequate PO trends

3) MVI supplementation per MD

4) Bowel care PRN

5) Weekly scaled weights

-------------------------------------------------------------------------------

Addendum: 01/02/23 at 1925 by Jordan Godfrey RD

-------------------------------------------------------------------------------

Amended: Links added.

## 2023-01-02 NOTE — NUR
Nursing Progress Note: 



Problem: Per 5150 pt. is here for SA by OD on street drugs after command hallucinations 
telling him to do so. Pt. states that he took "Fentanyl, black china, meth, 8 ball" in 
suicide attempt. Pt. states he feels hopeless. Pt. is also delusional, stating, "they won't 
give me my billion dollars". Pt. states, "I tried killing myself because Im a free renetta. 
They dont love me but I love them. Yes I do."



Interventions: Maintained a safe and supportive environment, ensured contract for safety, 
provided clear and simple instructions, observed for changes in usual behavior that may 
indicate increased suicide risk, provided medications as ordered, provided education on 
medications as needed, maintained Q 15min safety checks. 



Response: Pt. resting in his bed at change of shift, appears to be sleeping. Awoke at 8am. 
Attended breakfast and lunch, 1:1 done and medications administered with no issues. Pt 
stated he slept fine but is miserable being here, he continues to voice how he has good 
behavior with no reward. Denies A/VH as well as S/HI. Pt requested his PRN Tylenol for tooth 
pain with effective results. Pt requesting medication for anxiety, noon Ativan administered. 
Pt went to the patio with staff. Later in the day he voiced he wants to just make a run for 
it, I got to get out of here. Nurse encouraged pt. to try to be patient and placemat will 
eventually be found. 1600 medications administered at this time for anxiety/agitation. 



Plan: Pt. continues to require a safe and supportive environment while awaiting placement.


-------------------------------------------------------------------------------

Addendum: 01/02/23 at 1600 by Shagufta Potter RN

-------------------------------------------------------------------------------

PRN klonopin given, pt having anxiety and focusing about never getting out of here.

## 2023-01-03 VITALS — DIASTOLIC BLOOD PRESSURE: 56 MMHG | SYSTOLIC BLOOD PRESSURE: 104 MMHG

## 2023-01-03 VITALS — DIASTOLIC BLOOD PRESSURE: 64 MMHG | SYSTOLIC BLOOD PRESSURE: 95 MMHG

## 2023-01-03 RX ADMIN — BENZTROPINE MESYLATE SCH MG: 1 TABLET ORAL at 07:42

## 2023-01-03 RX ADMIN — DIVALPROEX SODIUM SCH MG: 250 TABLET, DELAYED RELEASE ORAL at 12:07

## 2023-01-03 RX ADMIN — POLYETHYLENE GLYCOL 3350 SCH GM: 17 POWDER, FOR SOLUTION ORAL at 20:15

## 2023-01-03 RX ADMIN — DIVALPROEX SODIUM SCH MG: 250 TABLET, DELAYED RELEASE ORAL at 07:42

## 2023-01-03 RX ADMIN — THERA TABS SCH EACH: TAB at 07:42

## 2023-01-03 RX ADMIN — Medication SCH PACKET: at 20:14

## 2023-01-03 RX ADMIN — DOCUSATE SODIUM SCH MG: 100 CAPSULE, LIQUID FILLED ORAL at 07:42

## 2023-01-03 RX ADMIN — DOCUSATE SODIUM SCH MG: 100 CAPSULE, LIQUID FILLED ORAL at 20:13

## 2023-01-03 RX ADMIN — DIVALPROEX SODIUM SCH MG: 250 TABLET, DELAYED RELEASE ORAL at 20:13

## 2023-01-03 RX ADMIN — BUPRENORPHINE AND NALOXONE SCH TAB: 2; .5 TABLET SUBLINGUAL at 15:00

## 2023-01-03 RX ADMIN — NICOTINE SCH PATCH: 21 PATCH, EXTENDED RELEASE TRANSDERMAL at 07:41

## 2023-01-03 RX ADMIN — BENZTROPINE MESYLATE SCH MG: 1 TABLET ORAL at 20:12

## 2023-01-03 RX ADMIN — BUPRENORPHINE AND NALOXONE SCH TAB: 2; .5 TABLET SUBLINGUAL at 07:42

## 2023-01-03 NOTE — NUR
Nursing Progress Note: 



Problem: Per 5150 pt. is here for SA by OD on street drugs after command hallucinations 
telling him to do so. Pt. states that he took "Fentanyl, black china, meth, 8 ball" in 
suicide attempt. Pt. states he feels hopeless. Pt. is also delusional, stating, "they won't 
give me my billion dollars". Pt. states, "I tried killing myself because Im a free renetta. 
They dont love me but I love them. Yes I do."



Interventions:

 Maintained a safe and supportive environment, ensured contract for safety, provided clear 
and simple instructions, observed for changes in usual behavior that may indicate increased 
suicide risk, provided medications as ordered, provided education on medications as needed, 
maintained Q 15min safety checks. 



Response: 



 Sleeping in room at start of shift.  Pt isolated in room sleeping all shift.  Woke  for 
meds declined to go to group room for snack.  Said he is hearing voices "the hospital knows 
all about that"   he said he is depressed "because I can't get out of here" 



Pt irritable took meds and went back to sleep.     



Plan: Pt. continues to require a safe and supportive environment while awaiting placement.

## 2023-01-03 NOTE — NUR
Nursing Progress Note: 



Problem: 

Per 5150 pt. is here for SA by OD on street drugs after command hallucinations telling him 
to do so. Pt. states that he took "Fentanyl, black china, meth, 8 ball" in suicide attempt. 
Pt. states he feels hopeless. Pt. is also delusional, stating, "they won't give me my 
billion dollars". Pt. states, "I tried killing myself because Im a free renetta. They dont 
love me but I love them. Yes I do."



Interventions: 

Maintained a safe and supportive environment, ensured contract for safety, provided clear 
and simple instructions, observed for changes in usual behavior that may indicate increased 
suicide risk, provided medications as ordered, provided education on medications as needed, 
maintained Q 15min safety checks. 



Response: 

Patient received resting quietly in bed.  Joins his peers in the community room for meals 
and interacts appropriately.  Continues to endorse AH.  Perseverates on wanting to discharge 
and believes he is being punished by being made to stay here.  Education provided on 
placement.  PRN Klonopin is helpful for anxiety.  Patient spends most of the day pacing the 
unit and listening to headphones.



Plan: 

Pt. continues to require a safe and supportive environment while awaiting placement.

## 2023-01-03 NOTE — NUR
Notified Dr Perla of elevated ammonia of 65, no changes in orders at this time, Said he will 
address in the am.

## 2023-01-04 VITALS — DIASTOLIC BLOOD PRESSURE: 66 MMHG | SYSTOLIC BLOOD PRESSURE: 109 MMHG

## 2023-01-04 VITALS — SYSTOLIC BLOOD PRESSURE: 117 MMHG | DIASTOLIC BLOOD PRESSURE: 65 MMHG

## 2023-01-04 RX ADMIN — NICOTINE SCH PATCH: 21 PATCH, EXTENDED RELEASE TRANSDERMAL at 08:59

## 2023-01-04 RX ADMIN — BUPRENORPHINE AND NALOXONE SCH TAB: 2; .5 TABLET SUBLINGUAL at 08:38

## 2023-01-04 RX ADMIN — THERA TABS SCH EACH: TAB at 08:40

## 2023-01-04 RX ADMIN — BENZTROPINE MESYLATE SCH MG: 1 TABLET ORAL at 08:40

## 2023-01-04 RX ADMIN — BUPRENORPHINE AND NALOXONE SCH TAB: 2; .5 TABLET SUBLINGUAL at 16:00

## 2023-01-04 RX ADMIN — POLYETHYLENE GLYCOL 3350 SCH GM: 17 POWDER, FOR SOLUTION ORAL at 20:50

## 2023-01-04 RX ADMIN — DOCUSATE SODIUM SCH MG: 100 CAPSULE, LIQUID FILLED ORAL at 20:47

## 2023-01-04 RX ADMIN — Medication SCH PACKET: at 20:50

## 2023-01-04 RX ADMIN — DIVALPROEX SODIUM SCH MG: 250 TABLET, DELAYED RELEASE ORAL at 11:59

## 2023-01-04 RX ADMIN — NICOTINE SCH PATCH: 21 PATCH, EXTENDED RELEASE TRANSDERMAL at 08:00

## 2023-01-04 RX ADMIN — DOCUSATE SODIUM SCH MG: 100 CAPSULE, LIQUID FILLED ORAL at 08:40

## 2023-01-04 RX ADMIN — DIVALPROEX SODIUM SCH MG: 250 TABLET, DELAYED RELEASE ORAL at 20:45

## 2023-01-04 RX ADMIN — NICOTINE SCH PATCH: 21 PATCH, EXTENDED RELEASE TRANSDERMAL at 08:37

## 2023-01-04 RX ADMIN — DIVALPROEX SODIUM SCH MG: 250 TABLET, DELAYED RELEASE ORAL at 08:38

## 2023-01-04 RX ADMIN — BENZTROPINE MESYLATE SCH MG: 1 TABLET ORAL at 20:47

## 2023-01-04 NOTE — NUR
PLACEMENT UPDATE



Mike's packet is at the following for review: [facility] in [place],

[facility], and [facility].



He has been declined at [facility], [facility] in [place], and 
[facility].



SIDDHARTHA Salamanca

## 2023-01-04 NOTE — NUR
Nursing Progress Note: 



Problem: 

Per 5150 pt. is here for SA by OD on street drugs after command hallucinations telling him 
to do so. Pt. states that he took "Fentanyl, black china, meth, 8 ball" in suicide attempt. 
Pt. states he feels hopeless. Pt. is also delusional, stating, "they won't give me my 
billion dollars". Pt. states, "I tried killing myself because Im a free renetta. They dont 
love me but I love them. Yes I do."



Interventions: 

Maintained a safe and supportive environment, ensured contract for safety, provided clear 
and simple instructions, observed for changes in usual behavior that may indicate increased 
suicide risk, provided medications as ordered, provided education on medications as needed, 
maintained Q 15min safety checks. 



Response: 

Patient up in halls at start of shift.  Minimal mumbling response to questions.  Declined to 
 come to group room for snack already in bed.  Took all medications cooperative with care 
and went to sleep.  



Plan: 

Pt. continues to require a safe and supportive environment while awaiting placement.

## 2023-01-04 NOTE — NUR
Nursing Progress Note: 



Problem: 

Per 5150 pt. is here for SA by OD on street drugs after command hallucinations telling him 
to do so. Pt. states that he took "Fentanyl, black china, meth, 8 ball" in suicide attempt. 
Pt. states he feels hopeless. Pt. is also delusional, stating, "they won't give me my 
billion dollars". Pt. states, "I tried killing myself because Im a free renetta. They dont 
love me but I love them. Yes I do."



Interventions: 

Maintained a safe and supportive environment, ensured contract for safety, provided clear 
and simple instructions, observed for changes in usual behavior that may indicate increased 
suicide risk, provided medications as ordered, provided education on medications as needed, 
maintained Q 15min safety checks. 



Response: 

Patient received resting quietly in bed.  Cooperative with assessment and medications.  
Denies any mental health symptoms.  Joins his peers in the community room for meals and 
interacts appropriately.  After breakfast the patient takes a nap.  Verbalizes feeling 
depressed due to being stuck here.  Patient isolates to his room all day and appears to be 
napping off and on.  Complains of increased anxiety in the afternoon.  PRN Klonopin is given 
with good effect.  



Plan: 

Pt. continues to require a safe and supportive environment while awaiting placement.

## 2023-01-05 VITALS — SYSTOLIC BLOOD PRESSURE: 107 MMHG | DIASTOLIC BLOOD PRESSURE: 67 MMHG

## 2023-01-05 VITALS — DIASTOLIC BLOOD PRESSURE: 67 MMHG | SYSTOLIC BLOOD PRESSURE: 107 MMHG

## 2023-01-05 VITALS — DIASTOLIC BLOOD PRESSURE: 47 MMHG | SYSTOLIC BLOOD PRESSURE: 92 MMHG

## 2023-01-05 RX ADMIN — NICOTINE SCH PATCH: 21 PATCH, EXTENDED RELEASE TRANSDERMAL at 08:00

## 2023-01-05 RX ADMIN — THERA TABS SCH EACH: TAB at 08:08

## 2023-01-05 RX ADMIN — BUPRENORPHINE AND NALOXONE SCH TAB: 2; .5 TABLET SUBLINGUAL at 16:13

## 2023-01-05 RX ADMIN — DOCUSATE SODIUM SCH MG: 100 CAPSULE, LIQUID FILLED ORAL at 08:08

## 2023-01-05 RX ADMIN — NICOTINE SCH PATCH: 21 PATCH, EXTENDED RELEASE TRANSDERMAL at 08:07

## 2023-01-05 RX ADMIN — Medication SCH PACKET: at 21:00

## 2023-01-05 RX ADMIN — DIVALPROEX SODIUM SCH MG: 250 TABLET, DELAYED RELEASE ORAL at 20:42

## 2023-01-05 RX ADMIN — SODIUM CHLORIDE PRN GM: 0.9 IRRIGANT IRRIGATION at 20:40

## 2023-01-05 RX ADMIN — DOCUSATE SODIUM SCH MG: 100 CAPSULE, LIQUID FILLED ORAL at 20:42

## 2023-01-05 RX ADMIN — DIVALPROEX SODIUM SCH MG: 250 TABLET, DELAYED RELEASE ORAL at 08:08

## 2023-01-05 RX ADMIN — BENZOCAINE PRN APPLIC: 200 SOLUTION TOPICAL at 19:06

## 2023-01-05 RX ADMIN — DIVALPROEX SODIUM SCH MG: 250 TABLET, DELAYED RELEASE ORAL at 12:10

## 2023-01-05 RX ADMIN — BENZTROPINE MESYLATE SCH MG: 1 TABLET ORAL at 08:08

## 2023-01-05 RX ADMIN — POLYETHYLENE GLYCOL 3350 SCH GM: 17 POWDER, FOR SOLUTION ORAL at 20:44

## 2023-01-05 RX ADMIN — BENZTROPINE MESYLATE SCH MG: 1 TABLET ORAL at 20:43

## 2023-01-05 RX ADMIN — BUPRENORPHINE AND NALOXONE SCH TAB: 2; .5 TABLET SUBLINGUAL at 08:08

## 2023-01-05 RX ADMIN — Medication SCH PACKET: at 20:18

## 2023-01-05 NOTE — NUR
Nursing Progress Note: 



Problem: 

Per 5150 pt. is here for SA by OD on street drugs after command hallucinations telling him 
to do so. Pt. states that he took "Fentanyl, black china, meth, 8 ball" in suicide attempt. 
Pt. states he feels hopeless. Pt. is also delusional, stating, "they won't give me my 
billion dollars". Pt. states, "I tried killing myself because Im a free renetta. They dont 
love me but I love them. Yes I do."



Interventions: 

Maintained a safe and supportive environment, ensured contract for safety, provided clear 
and simple instructions, observed for changes in usual behavior that may indicate increased 
suicide risk, provided medications as ordered, provided education on medications as needed, 
maintained Q 15min safety checks. 



Response: 

Patient received resting quietly in bed.  Cooperative with assessment and medications.  
Verbalizes depression related to being stuck here.  Appears angry and states, Im the most 
depressed Shauna ever been in my whole life!  Im so depressed Im going to kill myself!  Im 
going to kill myself and its going to be all Kellys [his conservator] fault!  Therapeutic 
listening and encouragement are provided.  Isolates to his room between meals and appears to 
be taking long naps.  



Plan: 

Pt. continues to require a safe and supportive environment while awaiting placement.

## 2023-01-05 NOTE — NUR
Nursing Progress Note: 



Problem: 

Per 5150 pt. is here for SA by OD on street drugs after command hallucinations telling him 
to do so. Pt. states that he took "Fentanyl, black china, meth, 8 ball" in suicide attempt. 
Pt. states he feels hopeless. Pt. is also delusional, stating, "they won't give me my 
billion dollars". Pt. states, "I tried killing myself because Im a free renetta. They dont 
love me but I love them. Yes I do."



Interventions: 

Maintained a safe and supportive environment, ensured contract for safety, provided clear 
and simple instructions, observed for changes in usual behavior that may indicate increased 
suicide risk, provided medications as ordered, provided education on medications as needed, 
maintained Q 15min safety checks. 



Response: 

Assumed care at shift change. Patient reported very upset at shift change. Interview 1:1 at 
shift change in patients room. Patient is awake, he is well oriented. Patient speaks about 
his frustration about wanting to go home. In addition he has a complaint of gum pain in his 
mouth. Anbisol was applied. The patient was redirected. He is cooperative. Patient states no 
BM today. He denies hallucinations. He denies S/I or H/I. Patient speaks with a soft voice, 
it is regular. Patient states that he ate his full dinner. No BM reported today. All PRN's 
for anxiety are timed out for now as most were given recently on day shift. Patient is 
cooperative with this writer.



Plan: 

Pt. continues to require a safe and supportive environment while awaiting placement.


-------------------------------------------------------------------------------

Addendum: 01/05/23 at 1926 by Pancho Mclaughlin RN

-------------------------------------------------------------------------------

Anbesol.

## 2023-01-05 NOTE — NUR
Nursing Progress Note: 



Problem: 

Per 5150 pt. is here for SA by OD on street drugs after command hallucinations telling him 
to do so. Pt. states that he took "Fentanyl, black china, meth, 8 ball" in suicide attempt. 
Pt. states he feels hopeless. Pt. is also delusional, stating, "they won't give me my 
billion dollars". Pt. states, "I tried killing myself because Im a free renetta. They dont 
love me but I love them. Yes I do."



Interventions: 

Maintained a safe and supportive environment, ensured contract for safety, provided clear 
and simple instructions, observed for changes in usual behavior that may indicate increased 
suicide risk, provided medications as ordered, provided education on medications as needed, 
maintained Q 15min safety checks. 



Response: Patient spent all shift self isolating and sleeping in room. Patient was awaken to 
take night medications and returned to bed. Patient requested no prn medications. 



Plan: 

Pt. continues to require a safe and supportive environment while awaiting placement.

## 2023-01-06 VITALS — SYSTOLIC BLOOD PRESSURE: 104 MMHG | DIASTOLIC BLOOD PRESSURE: 69 MMHG

## 2023-01-06 VITALS — DIASTOLIC BLOOD PRESSURE: 64 MMHG | SYSTOLIC BLOOD PRESSURE: 104 MMHG

## 2023-01-06 VITALS — SYSTOLIC BLOOD PRESSURE: 92 MMHG | DIASTOLIC BLOOD PRESSURE: 49 MMHG

## 2023-01-06 RX ADMIN — DIVALPROEX SODIUM SCH MG: 250 TABLET, DELAYED RELEASE ORAL at 11:35

## 2023-01-06 RX ADMIN — BENZTROPINE MESYLATE SCH MG: 1 TABLET ORAL at 21:05

## 2023-01-06 RX ADMIN — Medication SCH PACKET: at 21:04

## 2023-01-06 RX ADMIN — BENZTROPINE MESYLATE SCH MG: 1 TABLET ORAL at 07:47

## 2023-01-06 RX ADMIN — POLYETHYLENE GLYCOL 3350 SCH GM: 17 POWDER, FOR SOLUTION ORAL at 21:04

## 2023-01-06 RX ADMIN — DIVALPROEX SODIUM SCH MG: 250 TABLET, DELAYED RELEASE ORAL at 21:05

## 2023-01-06 RX ADMIN — BUPRENORPHINE AND NALOXONE SCH TAB: 2; .5 TABLET SUBLINGUAL at 14:01

## 2023-01-06 RX ADMIN — BENZOCAINE PRN APPLIC: 200 SOLUTION TOPICAL at 21:17

## 2023-01-06 RX ADMIN — NICOTINE SCH PATCH: 21 PATCH, EXTENDED RELEASE TRANSDERMAL at 07:55

## 2023-01-06 RX ADMIN — THERA TABS SCH EACH: TAB at 07:47

## 2023-01-06 RX ADMIN — DIVALPROEX SODIUM SCH MG: 250 TABLET, DELAYED RELEASE ORAL at 07:48

## 2023-01-06 RX ADMIN — BUPRENORPHINE AND NALOXONE SCH TAB: 2; .5 TABLET SUBLINGUAL at 07:48

## 2023-01-06 RX ADMIN — NICOTINE POLACRILEX PRN MG: 2 LOZENGE ORAL at 18:16

## 2023-01-06 RX ADMIN — DOCUSATE SODIUM SCH MG: 100 CAPSULE, LIQUID FILLED ORAL at 07:47

## 2023-01-06 RX ADMIN — DOCUSATE SODIUM SCH MG: 100 CAPSULE, LIQUID FILLED ORAL at 21:06

## 2023-01-06 RX ADMIN — BENZOCAINE PRN APPLIC: 200 SOLUTION TOPICAL at 12:13

## 2023-01-06 NOTE — NUR
Nursing Progress Note: 



Problem: 

Per 5150 pt. is here for SA by OD on street drugs after command hallucinations telling him 
to do so. Pt. states that he took "Fentanyl, black china, meth, 8 ball" in suicide attempt. 
Pt. states he feels hopeless. Pt. is also delusional, stating, "they won't give me my 
billion dollars". Pt. states, "I tried killing myself because Im a free renetta. They dont 
love me but I love them. Yes I do."



Interventions: 

Maintained a safe and supportive environment, ensured contract for safety, provided clear 
and simple instructions, observed for changes in usual behavior that may indicate increased 
suicide risk, provided medications as ordered, provided education on medications as needed, 
maintained Q 15min safety checks. 



Response: 

Patient is primarily isolating in his room following shift change. Patient is cooperative, 
he complains of increasing anxiety and feeling out of his body and other visual 
hallucinations. Patient requests medications for same. This writer called ZULEMA Samuels and 
advised of this situation. Per ZULEMA Ochoa aok to give patient 2100 Haldol and Seroquel. 
Patient was also given a sandwich and potato chips. He was also reassured that he is in a 
safe place. Patient denies S/I or H/I, he denies any audible hallucinations. Patient will be 
closely monitored. 



Plan: 

Pt. continues to require a safe and supportive environment while awaiting placement.

## 2023-01-06 NOTE — NUR
Patient complains of feeling "out of my body, I'm scared, the devil, I feel like jumping out 
of a window. Patient was cooperative and brought his fears and complaints to this writer. 
This writer used calming measures. PO Haldol and Klonopin given. Patient was reassured that 
he was safe and medications should be helping soon.
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
## 2023-01-06 NOTE — NUR
Sent updated notes to [facility] for placement purposes (12/22/22-1/5/23).



SIDDHARTHA Salamanca

## 2023-01-07 VITALS — DIASTOLIC BLOOD PRESSURE: 74 MMHG | SYSTOLIC BLOOD PRESSURE: 121 MMHG

## 2023-01-07 VITALS — DIASTOLIC BLOOD PRESSURE: 59 MMHG | SYSTOLIC BLOOD PRESSURE: 97 MMHG

## 2023-01-07 LAB
ALBUMIN SERPL BCP-MCNC: 3.6 G/DL (ref 3.4–5)
ALBUMIN/GLOB SERPL: 1.3 {RATIO} (ref 1.1–1.5)
ALP SERPL-CCNC: 75 IU/L (ref 46–116)
ALT SERPL W P-5'-P-CCNC: 20 U/L (ref 12–78)
ANION GAP SERPL CALCULATED.3IONS-SCNC: 9 MMOL/L (ref 8–16)
AST SERPL W P-5'-P-CCNC: 19 U/L (ref 10–37)
BASOPHILS # BLD AUTO: 0 X10'3 (ref 0–0.2)
BASOPHILS NFR BLD AUTO: 0.7 % (ref 0–1)
BILIRUB SERPL-MCNC: 0.5 MG/DL (ref 0.1–1)
BUN SERPL-MCNC: 18 MG/DL (ref 7–18)
BUN/CREAT SERPL: 19.1 (ref 5.4–32)
CALCIUM SERPL-MCNC: 8.8 MG/DL (ref 8.5–10.1)
CHLORIDE SERPL-SCNC: 99 MMOL/L (ref 99–107)
CO2 SERPL-SCNC: 27.7 MMOL/L (ref 24–32)
CREAT SERPL-MCNC: 0.94 MG/DL (ref 0.6–1.1)
EOSINOPHIL # BLD AUTO: 0.9 X10'3 (ref 0–0.9)
EOSINOPHIL NFR BLD AUTO: 14.9 % (ref 0–6)
ERYTHROCYTE [DISTWIDTH] IN BLOOD BY AUTOMATED COUNT: 13.7 % (ref 11.5–14.5)
GFR SERPL CREATININE-BSD FRML MDRD: > 90 ML/MIN
GLUCOSE SERPL-MCNC: 138 MG/DL (ref 70–104)
HCT VFR BLD AUTO: 36.6 % (ref 42–52)
HGB BLD-MCNC: 12.5 G/DL (ref 14–17.9)
LYMPHOCYTES # BLD AUTO: 1.4 X10'3 (ref 1.1–4.8)
LYMPHOCYTES NFR BLD AUTO: 22.6 % (ref 21–51)
MCH RBC QN AUTO: 31 PG (ref 27–31)
MCHC RBC AUTO-ENTMCNC: 34.2 G/DL (ref 33–36.5)
MCV RBC AUTO: 90.6 FL (ref 78–98)
MONOCYTES # BLD AUTO: 0.4 X10'3 (ref 0–0.9)
MONOCYTES NFR BLD AUTO: 6.6 % (ref 2–12)
NEUTROPHILS # BLD AUTO: 3.4 X10'3 (ref 1.8–7.7)
NEUTROPHILS NFR BLD AUTO: 55.2 % (ref 42–75)
PLATELET # BLD AUTO: 219 X10'3 (ref 140–440)
PMV BLD AUTO: 7.4 FL (ref 7.4–10.4)
POTASSIUM SERPL-SCNC: 3.9 MMOL/L (ref 3.5–5.1)
PROT SERPL-MCNC: 6.4 G/DL (ref 6.4–8.2)
RBC # BLD AUTO: 4.04 X10'6 (ref 4.7–6.1)
SODIUM SERPL-SCNC: 136 MMOL/L (ref 135–145)
VALPROATE SERPL-MCNC: 83 UG/ML (ref 50–100)
WBC # BLD AUTO: 6.2 X10'3 (ref 4.5–11)

## 2023-01-07 RX ADMIN — NICOTINE POLACRILEX PRN MG: 2 LOZENGE ORAL at 13:31

## 2023-01-07 RX ADMIN — BUPRENORPHINE AND NALOXONE SCH TAB: 2; .5 TABLET SUBLINGUAL at 15:05

## 2023-01-07 RX ADMIN — DIVALPROEX SODIUM SCH MG: 250 TABLET, DELAYED RELEASE ORAL at 07:55

## 2023-01-07 RX ADMIN — THERA TABS SCH EACH: TAB at 07:55

## 2023-01-07 RX ADMIN — NICOTINE SCH PATCH: 21 PATCH, EXTENDED RELEASE TRANSDERMAL at 08:42

## 2023-01-07 RX ADMIN — DIVALPROEX SODIUM SCH MG: 250 TABLET, DELAYED RELEASE ORAL at 20:06

## 2023-01-07 RX ADMIN — BENZOCAINE PRN APPLIC: 200 SOLUTION TOPICAL at 18:50

## 2023-01-07 RX ADMIN — BENZTROPINE MESYLATE SCH MG: 1 TABLET ORAL at 07:55

## 2023-01-07 RX ADMIN — DIVALPROEX SODIUM SCH MG: 250 TABLET, DELAYED RELEASE ORAL at 12:59

## 2023-01-07 RX ADMIN — BENZTROPINE MESYLATE SCH MG: 1 TABLET ORAL at 20:05

## 2023-01-07 RX ADMIN — DOCUSATE SODIUM SCH MG: 100 CAPSULE, LIQUID FILLED ORAL at 07:55

## 2023-01-07 RX ADMIN — BUPRENORPHINE AND NALOXONE SCH TAB: 2; .5 TABLET SUBLINGUAL at 08:00

## 2023-01-07 RX ADMIN — BUPRENORPHINE AND NALOXONE SCH TAB: 2; .5 TABLET SUBLINGUAL at 07:56

## 2023-01-07 RX ADMIN — Medication SCH PACKET: at 20:06

## 2023-01-07 RX ADMIN — DOCUSATE SODIUM SCH MG: 100 CAPSULE, LIQUID FILLED ORAL at 20:05

## 2023-01-07 RX ADMIN — POLYETHYLENE GLYCOL 3350 SCH GM: 17 POWDER, FOR SOLUTION ORAL at 20:07

## 2023-01-07 NOTE — NUR
Nursing Progress Note      



Problem: Per 5150 pt. is here for SA by OD on street drugs after command hallucinations 
telling him to do so. Pt. states that he took "Fentanyl, black china, meth, 8 ball" in 
suicide attempt. Pt. states he feels hopeless. Pt. is also delusional, stating, "they won't 
give me my billion dollars". Pt. states, "I tried killing myself because Im a free renetta. 
They dont love me but I love them. Yes I do."



Interventions: 1:1 assessment, therapeutic conversation, active listening, ensured contract 
for safety, medication administration/education/monitoring, behavior monitoring and 
intervention as needed; reality orientation, distraction, redirection, limit setting,show of 
support, encouragement, positive reinforcement, and Q15 minute safety checks.



Response: Pt was somewhat drowsy this morning but he did get up and eat breakfast in the 
dining room. He was cooperative with all scheduled medications except Suboxone which he 
refused stating, "no, I don't need that." Pt later reported that Suboxone makes you feel 
high but then when it wears off, you don't feel good. Notified Dr Perla of pt's Suboxone 
refusal. Dr Perla was concerned about withdrawal symptoms including irritability if pt 
refused his 2nd dose of Suboxone in the afternoon. Pt was given PRN Klonopin 2 mg by another 
RN while this RN was at lunch at 1412 for c/o increased anxiety and statements of "never 
getting out of here"  with good effect. Pt was cooperative with his 1500 Suboxone dose after 
this nurse provided some education on the risk of withdrawal symptoms if he abruptly stopped 
taking it. Pt's Haldol was decreased from QID to TID today, his Prazosin was decreased from 
4 mg to 2 mg. He has orders for labs at 1800 today: Ammonia, CBC/diff, CMP, and valproate.



Plan: Pt is conserved and awaiting placement.

## 2023-01-08 VITALS — DIASTOLIC BLOOD PRESSURE: 99 MMHG | SYSTOLIC BLOOD PRESSURE: 131 MMHG

## 2023-01-08 VITALS — DIASTOLIC BLOOD PRESSURE: 75 MMHG | SYSTOLIC BLOOD PRESSURE: 125 MMHG

## 2023-01-08 RX ADMIN — Medication SCH PACKET: at 19:11

## 2023-01-08 RX ADMIN — THERA TABS SCH EACH: TAB at 07:10

## 2023-01-08 RX ADMIN — DOCUSATE SODIUM SCH MG: 100 CAPSULE, LIQUID FILLED ORAL at 19:12

## 2023-01-08 RX ADMIN — DIVALPROEX SODIUM SCH MG: 250 TABLET, DELAYED RELEASE ORAL at 11:46

## 2023-01-08 RX ADMIN — DIVALPROEX SODIUM SCH MG: 250 TABLET, DELAYED RELEASE ORAL at 07:09

## 2023-01-08 RX ADMIN — DIVALPROEX SODIUM SCH MG: 250 TABLET, DELAYED RELEASE ORAL at 19:12

## 2023-01-08 RX ADMIN — DOCUSATE SODIUM SCH MG: 100 CAPSULE, LIQUID FILLED ORAL at 07:09

## 2023-01-08 RX ADMIN — BENZTROPINE MESYLATE SCH MG: 1 TABLET ORAL at 07:09

## 2023-01-08 RX ADMIN — NICOTINE SCH PATCH: 21 PATCH, EXTENDED RELEASE TRANSDERMAL at 07:11

## 2023-01-08 RX ADMIN — BUPRENORPHINE AND NALOXONE SCH TAB: 2; .5 TABLET SUBLINGUAL at 07:10

## 2023-01-08 RX ADMIN — POLYETHYLENE GLYCOL 3350 SCH GM: 17 POWDER, FOR SOLUTION ORAL at 19:11

## 2023-01-08 RX ADMIN — BENZTROPINE MESYLATE SCH MG: 1 TABLET ORAL at 19:11

## 2023-01-08 RX ADMIN — BUPRENORPHINE AND NALOXONE SCH TAB: 2; .5 TABLET SUBLINGUAL at 14:56

## 2023-01-08 RX ADMIN — NICOTINE POLACRILEX PRN MG: 2 LOZENGE ORAL at 09:14

## 2023-01-08 NOTE — NUR
Nursing Progress Note      



Problem: Per 5150 pt. is here for SA by OD on street drugs after command hallucinations 
telling him to do so. Pt. states that he took "Fentanyl, black china, meth, 8 ball" in 
suicide attempt. Pt. states he feels hopeless. Pt. is also delusional, stating, "they won't 
give me my billion dollars". Pt. states, "I tried killing myself because Im a free renetta. 
They dont love me but I love them. Yes I do."



Interventions: 1:1 assessment, therapeutic conversation, active listening, ensured contract 
for safety, medication administration/education/monitoring, behavior monitoring and 
intervention as needed; reality orientation, distraction, redirection, limit setting, show 
of support, encouragement, positive reinforcement, and Q15 minute safety checks.



Response:  Pt was up before breakfast. Compliant with medication administration. Pt agitated 
at beginning of shift stating, Im just tired of f*cking being here. It doesnt matter if I 
behave or not. I would rather be in long-term then here. Pt initially reluctant to taking 
medication; security called after pt began pushing on double doors and yelling; took AM 
medication as ordered. Headset offered and accepted; pacing unit. Refused breakfast. 
Behaviors de-escalated after breakfast. Pt expressed to this LVN during 1:1 SI/HI, denies 
audio hallucinations, endorses visual hallucination stating Im seeing like the blue light 
sabers in Star Wars. Pt also mistaking black marks on bedroom wall as flies. Endorses 
depressions/anxiety. Pt has poor insight/judgement to current situation. Disheveled 
appearance with fair hygiene. Pt ate lunch in community room. Multiple snacks given 
throughout shift. Pt napped in bedroom after lunch. Pt given PRN Tylenol, Ambesol, Haldol, 
and Klonopin. 



Plan: Pt is conserved and awaiting placement.
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
## 2023-01-09 VITALS — DIASTOLIC BLOOD PRESSURE: 87 MMHG | SYSTOLIC BLOOD PRESSURE: 121 MMHG

## 2023-01-09 VITALS — DIASTOLIC BLOOD PRESSURE: 64 MMHG | SYSTOLIC BLOOD PRESSURE: 100 MMHG

## 2023-01-09 RX ADMIN — DIVALPROEX SODIUM SCH MG: 250 TABLET, DELAYED RELEASE ORAL at 07:50

## 2023-01-09 RX ADMIN — Medication SCH PACKET: at 20:07

## 2023-01-09 RX ADMIN — DOCUSATE SODIUM SCH MG: 100 CAPSULE, LIQUID FILLED ORAL at 07:51

## 2023-01-09 RX ADMIN — DIVALPROEX SODIUM SCH MG: 250 TABLET, DELAYED RELEASE ORAL at 20:11

## 2023-01-09 RX ADMIN — DIVALPROEX SODIUM SCH MG: 250 TABLET, DELAYED RELEASE ORAL at 12:35

## 2023-01-09 RX ADMIN — BENZTROPINE MESYLATE SCH MG: 1 TABLET ORAL at 20:09

## 2023-01-09 RX ADMIN — BUPRENORPHINE AND NALOXONE SCH TAB: 2; .5 TABLET SUBLINGUAL at 07:51

## 2023-01-09 RX ADMIN — POLYETHYLENE GLYCOL 3350 SCH GM: 17 POWDER, FOR SOLUTION ORAL at 20:07

## 2023-01-09 RX ADMIN — NICOTINE POLACRILEX PRN MG: 2 LOZENGE ORAL at 14:06

## 2023-01-09 RX ADMIN — THERA TABS SCH EACH: TAB at 07:51

## 2023-01-09 RX ADMIN — BENZTROPINE MESYLATE SCH MG: 1 TABLET ORAL at 07:51

## 2023-01-09 RX ADMIN — BUPRENORPHINE AND NALOXONE SCH TAB: 2; .5 TABLET SUBLINGUAL at 16:23

## 2023-01-09 RX ADMIN — DOCUSATE SODIUM SCH MG: 100 CAPSULE, LIQUID FILLED ORAL at 20:08

## 2023-01-09 RX ADMIN — NICOTINE SCH PATCH: 21 PATCH, EXTENDED RELEASE TRANSDERMAL at 07:50

## 2023-01-09 NOTE — NUR
Nursing Progress Note      



Problem: 

Per 5150 pt. is here for SA by OD on street drugs after command hallucinations telling him 
to do so. Pt. states that he took "Fentanyl, black china, meth, 8 ball" in suicide attempt. 
Pt. states he feels hopeless. Pt. is also delusional, stating, "They won't give me my 
billion dollars". Pt. states, "I tried killing myself because Im a free renetta. They dont 
love me but I love them. Yes I do."



Interventions: 

1:1 assessment, therapeutic conversation, active listening, ensured contract for safety, 
medication administration/education/monitoring, behavior monitoring and intervention as 
needed; reality orientation, distraction, redirection, limit setting, show of support, 
encouragement, positive reinforcement, and Q15 minute safety checks.



Response:  

Patient received resting quietly in bed.  Awake prior to breakfast drinking coffee and 
pacing the unit.  Cooperative with 1:1 assessment and medications.  Continues to endorse 
auditory hallucinations.  Appears fatigued in the morning and endorses depression from 
having to stay here.  Takes a long nap after breakfast.  PRN Klonopin given for anxiety with 
good results.  In the afternoon the patient is noted smiling and socializing with peers and 
pacing/ listening to headphones.  



Plan: 

Pt is conserved and awaiting placement.

## 2023-01-09 NOTE — NUR
Nursing Progress Note      



Problem: Per 5150 pt. is here for SA by OD on street drugs after command hallucinations 
telling him to do so. Pt. states that he took "Fentanyl, black china, meth, 8 ball" in 
suicide attempt. Pt. states he feels hopeless. Pt. is also delusional, stating, "they won't 
give me my billion dollars". Pt. states, "I tried killing myself because Im a free renetta. 
They dont love me but I love them. Yes I do."



Interventions: 1:1 assessment, therapeutic conversation, active listening, ensured contract 
for safety, medication administration/education/monitoring, behavior monitoring and 
intervention as needed; reality orientation, distraction, redirection, limit setting,show of 
support, encouragement, positive reinforcement, and Q15 minute safety checks.



Response: Patient started the shift agitated and hitting the wall or door; he was easily 
directed to his room by writer. Patient expressed feelings of hopelessness and expressed 
wanting to go to alf because he no longer wants to be stuck on the unit. Patient took HS 
medications early this shift without any hesitation. PRNs Haldol, Clonazepam, Tylenol and 
Anbesol provided. Patient was thankful and cooperative with all help calming him. A little 
later in the shift, patient expressed he's been struggling with A/VH of a shadow and 
explained feelings of intrusiveness from the shadow. Patient also believed that he was 
hearing negative whispers regarding writer that contributed to his upset. Patient was 
provided HS snacks and listened to headsets prior to bed; observed sleeping and does not 
appear to be having difficulty.  



Plan: Pt is conserved and awaiting placement.

## 2023-01-10 VITALS — DIASTOLIC BLOOD PRESSURE: 69 MMHG | SYSTOLIC BLOOD PRESSURE: 118 MMHG

## 2023-01-10 VITALS — DIASTOLIC BLOOD PRESSURE: 84 MMHG | SYSTOLIC BLOOD PRESSURE: 131 MMHG

## 2023-01-10 RX ADMIN — DIVALPROEX SODIUM SCH MG: 250 TABLET, DELAYED RELEASE ORAL at 18:50

## 2023-01-10 RX ADMIN — DOCUSATE SODIUM SCH MG: 100 CAPSULE, LIQUID FILLED ORAL at 08:21

## 2023-01-10 RX ADMIN — BENZTROPINE MESYLATE SCH MG: 1 TABLET ORAL at 18:50

## 2023-01-10 RX ADMIN — DIVALPROEX SODIUM SCH MG: 250 TABLET, DELAYED RELEASE ORAL at 12:34

## 2023-01-10 RX ADMIN — Medication SCH PACKET: at 18:48

## 2023-01-10 RX ADMIN — THERA TABS SCH EACH: TAB at 08:21

## 2023-01-10 RX ADMIN — NICOTINE SCH PATCH: 21 PATCH, EXTENDED RELEASE TRANSDERMAL at 08:19

## 2023-01-10 RX ADMIN — BENZOCAINE PRN APPLIC: 200 SOLUTION TOPICAL at 16:11

## 2023-01-10 RX ADMIN — BENZTROPINE MESYLATE SCH MG: 1 TABLET ORAL at 08:21

## 2023-01-10 RX ADMIN — DIVALPROEX SODIUM SCH MG: 250 TABLET, DELAYED RELEASE ORAL at 08:21

## 2023-01-10 RX ADMIN — BUPRENORPHINE AND NALOXONE SCH TAB: 2; .5 TABLET SUBLINGUAL at 15:50

## 2023-01-10 RX ADMIN — BUPRENORPHINE AND NALOXONE SCH TAB: 2; .5 TABLET SUBLINGUAL at 08:22

## 2023-01-10 RX ADMIN — POLYETHYLENE GLYCOL 3350 SCH GM: 17 POWDER, FOR SOLUTION ORAL at 18:49

## 2023-01-10 RX ADMIN — DOCUSATE SODIUM SCH MG: 100 CAPSULE, LIQUID FILLED ORAL at 18:49

## 2023-01-10 NOTE — NUR
Telephone order obtained to give HS meds early due to seclusion and continued agitation.  
Also obtained order for  1x 500 mg of Naproxen for toothache.
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
## 2023-01-10 NOTE — NUR
Nursing Progress Note      



Problem: Per 5150 pt. is here for SA by OD on street drugs after command hallucinations 
telling him to do so. Pt. states that he took "Fentanyl, black china, meth, 8 ball" in 
suicide attempt. Pt. states he feels hopeless. Pt. is also delusional, stating, "they won't 
give me my billion dollars". Pt. states, "I tried killing myself because Im a free renetta. 
They dont love me but I love them. Yes I do."



Interventions: 1:1 assessment, therapeutic conversation, active listening, ensured contract 
for safety, medication administration/education/monitoring, behavior monitoring and 
intervention as needed; reality orientation, distraction, redirection, limit setting,show of 
support, encouragement, positive reinforcement, and Q15 minute safety checks.



Response: Patient is pleasant and cooperative with care; compliant with medication. PRNs 
Anbesol and Tylenol provided. Nicotine patch removed. No negative behaviors presented this 
shift and no SI, HI, A/VH reported this shift. Patient social with staff and participated in 
HS snack prior to bed; observed sleeping and does not appear to be having difficulty. 



Plan: Pt is conserved and awaiting placement.

## 2023-01-11 VITALS — SYSTOLIC BLOOD PRESSURE: 121 MMHG | DIASTOLIC BLOOD PRESSURE: 77 MMHG

## 2023-01-11 RX ADMIN — DOCUSATE SODIUM SCH MG: 100 CAPSULE, LIQUID FILLED ORAL at 20:13

## 2023-01-11 RX ADMIN — DIVALPROEX SODIUM SCH MG: 250 TABLET, DELAYED RELEASE ORAL at 12:04

## 2023-01-11 RX ADMIN — BENZOCAINE PRN APPLIC: 200 SOLUTION TOPICAL at 20:54

## 2023-01-11 RX ADMIN — BENZTROPINE MESYLATE SCH MG: 1 TABLET ORAL at 20:12

## 2023-01-11 RX ADMIN — THERA TABS SCH EACH: TAB at 08:18

## 2023-01-11 RX ADMIN — BUPRENORPHINE AND NALOXONE SCH TAB: 2; .5 TABLET SUBLINGUAL at 08:19

## 2023-01-11 RX ADMIN — POLYETHYLENE GLYCOL 3350 SCH GM: 17 POWDER, FOR SOLUTION ORAL at 20:10

## 2023-01-11 RX ADMIN — DIVALPROEX SODIUM SCH MG: 250 TABLET, DELAYED RELEASE ORAL at 08:19

## 2023-01-11 RX ADMIN — NICOTINE SCH PATCH: 21 PATCH, EXTENDED RELEASE TRANSDERMAL at 08:32

## 2023-01-11 RX ADMIN — Medication SCH PACKET: at 20:10

## 2023-01-11 RX ADMIN — NICOTINE POLACRILEX PRN MG: 2 LOZENGE ORAL at 14:14

## 2023-01-11 RX ADMIN — DIVALPROEX SODIUM SCH MG: 250 TABLET, DELAYED RELEASE ORAL at 20:12

## 2023-01-11 RX ADMIN — BENZTROPINE MESYLATE SCH MG: 1 TABLET ORAL at 08:18

## 2023-01-11 RX ADMIN — DOCUSATE SODIUM SCH MG: 100 CAPSULE, LIQUID FILLED ORAL at 08:18

## 2023-01-11 RX ADMIN — BUPRENORPHINE AND NALOXONE SCH TAB: 2; .5 TABLET SUBLINGUAL at 14:56
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
## 2023-01-12 VITALS — SYSTOLIC BLOOD PRESSURE: 123 MMHG | DIASTOLIC BLOOD PRESSURE: 62 MMHG

## 2023-01-12 VITALS — DIASTOLIC BLOOD PRESSURE: 57 MMHG | SYSTOLIC BLOOD PRESSURE: 100 MMHG

## 2023-01-12 RX ADMIN — NICOTINE SCH PATCH: 21 PATCH, EXTENDED RELEASE TRANSDERMAL at 07:24

## 2023-01-12 RX ADMIN — POLYETHYLENE GLYCOL 3350 SCH GM: 17 POWDER, FOR SOLUTION ORAL at 20:39

## 2023-01-12 RX ADMIN — DIVALPROEX SODIUM SCH MG: 250 TABLET, DELAYED RELEASE ORAL at 12:23

## 2023-01-12 RX ADMIN — BUPRENORPHINE AND NALOXONE SCH TAB: 2; .5 TABLET SUBLINGUAL at 15:35

## 2023-01-12 RX ADMIN — BENZOCAINE PRN APPLIC: 200 SOLUTION TOPICAL at 21:07

## 2023-01-12 RX ADMIN — DIVALPROEX SODIUM SCH MG: 250 TABLET, DELAYED RELEASE ORAL at 20:45

## 2023-01-12 RX ADMIN — DOCUSATE SODIUM SCH MG: 100 CAPSULE, LIQUID FILLED ORAL at 07:22

## 2023-01-12 RX ADMIN — DOCUSATE SODIUM SCH MG: 100 CAPSULE, LIQUID FILLED ORAL at 20:44

## 2023-01-12 RX ADMIN — BENZTROPINE MESYLATE SCH MG: 1 TABLET ORAL at 07:22

## 2023-01-12 RX ADMIN — BENZTROPINE MESYLATE SCH MG: 1 TABLET ORAL at 20:46

## 2023-01-12 RX ADMIN — BUPRENORPHINE AND NALOXONE SCH TAB: 2; .5 TABLET SUBLINGUAL at 07:23

## 2023-01-12 RX ADMIN — THERA TABS SCH EACH: TAB at 07:23

## 2023-01-12 RX ADMIN — Medication SCH PACKET: at 20:39

## 2023-01-12 RX ADMIN — DIVALPROEX SODIUM SCH MG: 250 TABLET, DELAYED RELEASE ORAL at 07:22
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
## 2023-01-12 NOTE — NUR
Nursing Progress Note



Problem: 

Per 5150 pt. is here for SA by OD on street drugs after command hallucinations telling him 
to do so. Pt. states that he took "Fentanyl, black china, meth, 8 ball" in suicide attempt.



Interventions: 

Maintained a safe and supportive environment, ensured contract for safety, provided clear 
and simple instructions, attempted to orient to reality, monitored behavior and provided 
intervention as needed, provided active listening and positive encouragement, encouraged 
independent performance of ADLs, and maintained Q 15min safety checks. 



Response: Patient walking hallways and socializing at change of shift.  Patient complaining 
of facial pain and tooth pain, given Ambesol with good relief of symptoms. Patient is 
medication compliant.  Patient went down to CT for facial CT, which was grossly negative. 
Patient took a shower and then went to bed at 2100, and is still sleeping at this time.







Plan :

Per Dr. Davalos, pt. continues to require a safe and supportive environment. Pt has been 
conserved and awaiting placement by [facility].

## 2023-01-12 NOTE — NUR
F/u 1/12:  Pt PO ~73% avg vegetarian diet up to 100% at times receiving 

double eggs WB and double entree BIDLD in addition to sometimes multiple 

snacks daily per EMR; likely meeting estimated needs. LBM 1/10. No 

nutrition interventions at this time. Will continue to follow.

Recommendations:

1) Continue Vegetarian diet per pt request; double eggs WB, double entree 

BIDLD

2) MVI supplementation per MD

3) Bowel care PRN

4) Weekly scaled weights

-------------------------------------------------------------------------------

Addendum: 01/12/23 at 0757 by Jordan Godfrey RD

-------------------------------------------------------------------------------

Amended: Links added.

## 2023-01-12 NOTE — NUR
Nursing Progress Note: [place]



Problem: 

Patient is 5150 status. Per admit note "[place] 
[place]ention of killing yourself". During interview pt. endorses SI without a plan, as well as 
auditory hallucinations, states he hears random words like, "Chance". Pt. reports he had 
been living at [facility] for 4 months before he relapsed 2 days ago, drinking whiskey 
until he blacked out and snorting meth. Pt. reports he is now homeless. 



Interventions:

 Medication administration, 1:1 MH assessment, maintained a safe and supportive environment, 
 provided clear and simple instructions, provided encouragement regarding performance of 
ADLs, monitored behaviors and maintained clear boundaries, maintained Q15 minute safety 
checks.



Response:

Pt. received asleep, awoke for his medications which he took without hesitation. He denies 
SI, HI, AH, VH, and describes being goal oriented to find placement. He appears fatigued and 
mumbles at times.Pt. took a nap and awoke later for snack. He denies any pain r/t swelling 
to upper bridge of his nose and light purple ecchymosis to bilateral orbital areas, but did 
request lotion for his face. He walked around the unit in a slow pace and socialized at 
times with male peers. Pt. has a flat affect and presents as down casted. He ate all meals 
in the main dining room, but found to take in less than 50% of meals. He has fair hygiene, 
short hair, and wears street clothes. 

 

Plan:

Pt requires stabilization with medication adjustment and management in a safe and 
therapeutic environment.






-------------------------------------------------------------------------------

Addendum: 01/12/23 at 1633 by Gisele Matt RN

-------------------------------------------------------------------------------

Weekly weight 117.2kg indicating 4.4kg weight loss.

-------------------------------------------------------------------------------

Addendum: 01/13/23 at 1346 by Gisele Matt RN

-------------------------------------------------------------------------------

Disregard previous Problem, please correction





Problem: 

Per 2510 pt. is here for SA by OD on street drugs after command hallucinations telling him 
to do so. Pt. states that he took "Fentanyl, black china, meth, 8 ball" in suicide attempt.

## 2023-01-13 VITALS — DIASTOLIC BLOOD PRESSURE: 47 MMHG | SYSTOLIC BLOOD PRESSURE: 101 MMHG

## 2023-01-13 VITALS — DIASTOLIC BLOOD PRESSURE: 59 MMHG | SYSTOLIC BLOOD PRESSURE: 126 MMHG

## 2023-01-13 RX ADMIN — BUPRENORPHINE AND NALOXONE SCH TAB: 2; .5 TABLET SUBLINGUAL at 15:46

## 2023-01-13 RX ADMIN — DOCUSATE SODIUM SCH MG: 100 CAPSULE, LIQUID FILLED ORAL at 08:00

## 2023-01-13 RX ADMIN — NICOTINE SCH PATCH: 21 PATCH, EXTENDED RELEASE TRANSDERMAL at 08:02

## 2023-01-13 RX ADMIN — BUPRENORPHINE AND NALOXONE SCH TAB: 2; .5 TABLET SUBLINGUAL at 08:01

## 2023-01-13 RX ADMIN — BENZTROPINE MESYLATE SCH MG: 1 TABLET ORAL at 20:14

## 2023-01-13 RX ADMIN — THERA TABS SCH EACH: TAB at 08:00

## 2023-01-13 RX ADMIN — DIVALPROEX SODIUM SCH MG: 250 TABLET, DELAYED RELEASE ORAL at 20:13

## 2023-01-13 RX ADMIN — DOCUSATE SODIUM SCH MG: 100 CAPSULE, LIQUID FILLED ORAL at 20:15

## 2023-01-13 RX ADMIN — Medication SCH PACKET: at 21:00

## 2023-01-13 RX ADMIN — BENZOCAINE PRN APPLIC: 200 SOLUTION TOPICAL at 20:25

## 2023-01-13 RX ADMIN — BENZOCAINE PRN APPLIC: 200 SOLUTION TOPICAL at 17:19

## 2023-01-13 RX ADMIN — DIVALPROEX SODIUM SCH MG: 250 TABLET, DELAYED RELEASE ORAL at 12:33

## 2023-01-13 RX ADMIN — POLYETHYLENE GLYCOL 3350 SCH GM: 17 POWDER, FOR SOLUTION ORAL at 21:00

## 2023-01-13 RX ADMIN — DIVALPROEX SODIUM SCH MG: 250 TABLET, DELAYED RELEASE ORAL at 08:00

## 2023-01-13 RX ADMIN — BENZTROPINE MESYLATE SCH MG: 1 TABLET ORAL at 08:01

## 2023-01-13 NOTE — NUR
Nursing Progress Note: [place]



Problem: 

Per 5150 pt. is here for SA by OD on street drugs after command hallucinations telling him 
to do so. Pt. states that he took "Fentanyl, black china, meth, 8 ball" in suicide attempt.



Interventions:

 Medication administration, 1:1 MH assessment, maintained a safe and supportive environment, 
 provided clear and simple instructions, provided encouragement regarding performance of 
ADLs, monitored behaviors and maintained clear boundaries, maintained Q15 minute safety 
checks.



Response:

Pt. received asleep, he awoke for his medications, and proceeded to breakfast. He denies SI, 
HI, AH, VH and presents with a flat affect and down casted. Pt. found to have an increase in 
area of swelling upper bridge of nose and ecchymosis continues to bilateral orbital area.  
He denies needing anything for pain. Pt. slept mostly in his room today, often waking up 
before a meal and returning to bed afterwards. Pt. continues to have poor meal intake d/t 
my tooth pain he was given an Ensure with each meal as his weight loss was over 4kg from 
last week. No behaviors or outbursts this shift. 

 His hygiene is deficient, slightly disheveled, and is wearing the same clothes from 
yesterday.  

 

Plan:

Pt requires stabilization with medication adjustment and management in a safe and 
therapeutic environment.

## 2023-01-13 NOTE — NUR
Nursing Progress Note: [place]



Problem: 

Patient is 5150 status. Per admit note "[place] of [place] yourself". During interview pt. endorses SI without a plan, as well as 
auditory hallucinations, states he hears random words like, "Chance". Pt. reports he had 
been living at [facility] for 4 months before he relapsed 2 days ago, drinking whiskey 
until he blacked out and snorting meth. Pt. reports he is now homeless. 



Interventions:

 Medication administration, 1:1 MH assessment, maintained a safe and supportive environment, 
 provided clear and simple instructions, provided encouragement regarding performance of 
ADLs, monitored behaviors and maintained clear boundaries, maintained Q15 minute safety 
checks.



Response:

Patient received at shift change. Patient isolates in his room, he is awake and laying in 
his bed. 1:1 Interview at bedside. The patient is alert and less paranoid than the previous 
night shift. He continues to complain of dental and facial pain. Anxiety is present. Patient 
denies S/I, H/I or any hallucinations. Patient is cooperative with staff. Ambusel is used 
for dental pain along with PRN Tylenol 650 mg. Klonopin 2 mg was given for anxiety. Patient 
states he is still depressed and fearful of being hurt again. The patient exhibits 
understanding about his surroundings. He speaks softly. Patient is reminded that this writer 
and others will visit quietly throughout the night to check on him. Patient states no BM 
today. 



 

Plan:

Pt requires stabilization with medication adjustment and management in a safe and 
therapeutic environment.

## 2023-01-13 NOTE — NUR
Sent updated notes (1/6-1/12/23) to Public Guardian (Juana Desir email: 
[email or web address]) for placement purposes.



SIDDHARTHA Salamanca

## 2023-01-14 VITALS — SYSTOLIC BLOOD PRESSURE: 114 MMHG | DIASTOLIC BLOOD PRESSURE: 65 MMHG

## 2023-01-14 VITALS — SYSTOLIC BLOOD PRESSURE: 101 MMHG | DIASTOLIC BLOOD PRESSURE: 62 MMHG

## 2023-01-14 VITALS — SYSTOLIC BLOOD PRESSURE: 100 MMHG | DIASTOLIC BLOOD PRESSURE: 61 MMHG

## 2023-01-14 RX ADMIN — THERA TABS SCH EACH: TAB at 08:36

## 2023-01-14 RX ADMIN — NICOTINE SCH PATCH: 21 PATCH, EXTENDED RELEASE TRANSDERMAL at 08:00

## 2023-01-14 RX ADMIN — DOCUSATE SODIUM SCH MG: 100 CAPSULE, LIQUID FILLED ORAL at 20:59

## 2023-01-14 RX ADMIN — BUPRENORPHINE AND NALOXONE SCH TAB: 2; .5 TABLET SUBLINGUAL at 08:36

## 2023-01-14 RX ADMIN — BENZTROPINE MESYLATE SCH MG: 1 TABLET ORAL at 08:36

## 2023-01-14 RX ADMIN — DIVALPROEX SODIUM SCH MG: 250 TABLET, DELAYED RELEASE ORAL at 21:00

## 2023-01-14 RX ADMIN — BUPRENORPHINE AND NALOXONE SCH TAB: 2; .5 TABLET SUBLINGUAL at 15:35

## 2023-01-14 RX ADMIN — BENZTROPINE MESYLATE SCH MG: 1 TABLET ORAL at 21:00

## 2023-01-14 RX ADMIN — DIVALPROEX SODIUM SCH MG: 250 TABLET, DELAYED RELEASE ORAL at 08:36

## 2023-01-14 RX ADMIN — DOCUSATE SODIUM SCH MG: 100 CAPSULE, LIQUID FILLED ORAL at 08:36

## 2023-01-14 RX ADMIN — Medication SCH PACKET: at 21:01

## 2023-01-14 RX ADMIN — DIVALPROEX SODIUM SCH MG: 250 TABLET, DELAYED RELEASE ORAL at 12:46

## 2023-01-14 RX ADMIN — BENZOCAINE PRN APPLIC: 200 SOLUTION TOPICAL at 16:08

## 2023-01-14 RX ADMIN — POLYETHYLENE GLYCOL 3350 SCH GM: 17 POWDER, FOR SOLUTION ORAL at 21:01

## 2023-01-14 NOTE — NUR
Nursing Progress Note 



Problem : 

Per 5150 pt. is here for SA by OD on street drugs after command hallucinations telling him 
to do so. Pt. states that he took "Fentanyl, black china, meth, 8 ball" in suicide attempt.



Interventions : 

Maintained a safe and supportive environment, ensured contract for safety, provided clear 
and simple instructions, attempted to orient to reality, monitored behavior and provided 
intervention as needed, provided active listening and positive encouragement, encouraged 
independent performance of ADLs, and maintained Q 15min safety checks. 



Response : 

Received Pt in bed sleeping w/o distress at change of shift. Pt woke and was cooperative 
with vitals and returned to sleep. He woke for breakfast and took AM meds w/o issue. Pt 
cooperative with assessments and pleasant; he denies SI/HI  and A/VHs at this time. Pt 
lethargic today and napped in both AM and PM. He watched football for a bit in afternoon 
before getting PRN Anbesol for tooth pain and then returning to bed. Pt did not c/o facial 
pain or have angry outbursts this shift.



Plan :

Per Dr. Davalos, pt. continues to require a safe and supportive environment. Pt has been 
conserved and awaiting placement by [facility].

## 2023-01-14 NOTE — NUR
Patient in room . I have received report from ENIO Eisenberg and had the opportunity to ask 
questions and assume patient care.

## 2023-01-15 VITALS — DIASTOLIC BLOOD PRESSURE: 56 MMHG | SYSTOLIC BLOOD PRESSURE: 103 MMHG

## 2023-01-15 VITALS — SYSTOLIC BLOOD PRESSURE: 105 MMHG | DIASTOLIC BLOOD PRESSURE: 66 MMHG

## 2023-01-15 LAB
ALBUMIN SERPL BCP-MCNC: 3 G/DL (ref 3.4–5)
ALBUMIN/GLOB SERPL: 0.9 {RATIO} (ref 1.1–1.5)
ALP SERPL-CCNC: 89 IU/L (ref 46–116)
ALT SERPL W P-5'-P-CCNC: 17 U/L (ref 12–78)
ANION GAP SERPL CALCULATED.3IONS-SCNC: 5 MMOL/L (ref 8–16)
AST SERPL W P-5'-P-CCNC: 18 U/L (ref 10–37)
BASOPHILS # BLD AUTO: 0 X10'3 (ref 0–0.2)
BASOPHILS NFR BLD AUTO: 0.8 % (ref 0–1)
BILIRUB SERPL-MCNC: 0.3 MG/DL (ref 0.1–1)
BUN SERPL-MCNC: 22 MG/DL (ref 7–18)
BUN/CREAT SERPL: 23.4 (ref 5.4–32)
CALCIUM SERPL-MCNC: 8.2 MG/DL (ref 8.5–10.1)
CHLORIDE SERPL-SCNC: 104 MMOL/L (ref 99–107)
CO2 SERPL-SCNC: 31 MMOL/L (ref 24–32)
CREAT SERPL-MCNC: 0.94 MG/DL (ref 0.6–1.1)
EOSINOPHIL # BLD AUTO: 0.8 X10'3 (ref 0–0.9)
EOSINOPHIL NFR BLD AUTO: 18.8 % (ref 0–6)
ERYTHROCYTE [DISTWIDTH] IN BLOOD BY AUTOMATED COUNT: 13.1 % (ref 11.5–14.5)
GFR SERPL CREATININE-BSD FRML MDRD: > 90 ML/MIN
GLUCOSE SERPL-MCNC: 119 MG/DL (ref 70–104)
HCT VFR BLD AUTO: 36.1 % (ref 42–52)
HGB BLD-MCNC: 12.5 G/DL (ref 14–17.9)
LYMPHOCYTES # BLD AUTO: 1.2 X10'3 (ref 1.1–4.8)
LYMPHOCYTES NFR BLD AUTO: 26.8 % (ref 21–51)
MCH RBC QN AUTO: 31.2 PG (ref 27–31)
MCHC RBC AUTO-ENTMCNC: 34.5 G/DL (ref 33–36.5)
MCV RBC AUTO: 90.2 FL (ref 78–98)
MONOCYTES # BLD AUTO: 0.3 X10'3 (ref 0–0.9)
MONOCYTES NFR BLD AUTO: 6.9 % (ref 2–12)
NEUTROPHILS # BLD AUTO: 2 X10'3 (ref 1.8–7.7)
NEUTROPHILS NFR BLD AUTO: 46.7 % (ref 42–75)
PLATELET # BLD AUTO: 199 X10'3 (ref 140–440)
PMV BLD AUTO: 7.8 FL (ref 7.4–10.4)
POTASSIUM SERPL-SCNC: 4.4 MMOL/L (ref 3.5–5.1)
PROT SERPL-MCNC: 6.2 G/DL (ref 6.4–8.2)
RBC # BLD AUTO: 4 X10'6 (ref 4.7–6.1)
SODIUM SERPL-SCNC: 140 MMOL/L (ref 135–145)
WBC # BLD AUTO: 4.3 X10'3 (ref 4.5–11)

## 2023-01-15 RX ADMIN — BENZTROPINE MESYLATE SCH MG: 1 TABLET ORAL at 20:01

## 2023-01-15 RX ADMIN — DIVALPROEX SODIUM SCH MG: 250 TABLET, DELAYED RELEASE ORAL at 08:22

## 2023-01-15 RX ADMIN — DIVALPROEX SODIUM SCH MG: 250 TABLET, DELAYED RELEASE ORAL at 20:01

## 2023-01-15 RX ADMIN — THERA TABS SCH EACH: TAB at 08:19

## 2023-01-15 RX ADMIN — BENZOCAINE PRN APPLIC: 200 SOLUTION TOPICAL at 17:48

## 2023-01-15 RX ADMIN — BUPRENORPHINE AND NALOXONE SCH TAB: 2; .5 TABLET SUBLINGUAL at 15:28

## 2023-01-15 RX ADMIN — DOCUSATE SODIUM SCH MG: 100 CAPSULE, LIQUID FILLED ORAL at 20:00

## 2023-01-15 RX ADMIN — BENZTROPINE MESYLATE SCH MG: 1 TABLET ORAL at 08:18

## 2023-01-15 RX ADMIN — NICOTINE SCH PATCH: 21 PATCH, EXTENDED RELEASE TRANSDERMAL at 08:20

## 2023-01-15 RX ADMIN — BUPRENORPHINE AND NALOXONE SCH TAB: 2; .5 TABLET SUBLINGUAL at 08:18

## 2023-01-15 RX ADMIN — POLYETHYLENE GLYCOL 3350 SCH GM: 17 POWDER, FOR SOLUTION ORAL at 20:00

## 2023-01-15 RX ADMIN — Medication SCH PACKET: at 20:00

## 2023-01-15 RX ADMIN — BENZOCAINE PRN APPLIC: 200 SOLUTION TOPICAL at 08:28

## 2023-01-15 RX ADMIN — DOCUSATE SODIUM SCH MG: 100 CAPSULE, LIQUID FILLED ORAL at 08:18

## 2023-01-15 RX ADMIN — DIVALPROEX SODIUM SCH MG: 250 TABLET, DELAYED RELEASE ORAL at 12:37

## 2023-01-15 NOTE — NUR
Mike slept for 8 hours,he walked up around 1 p.m asking for Oragel,his tooth was 
hurting.He wend back to sleep after.He had a good night ,stayed in his room isolated.

## 2023-01-15 NOTE — NUR
Nursing Progress Note



Problem: 

Per 5150 pt. is here for SA by OD on street drugs after command hallucinations telling him 
to do so. Pt. states that he took "Fentanyl, black china, meth, 8 ball" in suicide attempt.



Interventions: 

Maintained a safe and supportive environment, ensured contract for safety, provided clear 
and simple instructions, attempted to orient to reality, monitored behavior and provided 
intervention as needed, provided active listening and positive encouragement, encouraged 
independent performance of ADLs, and maintained Q 15min safety checks. 



Response:  

Received patient sleeping in bed at change of shift.  Patient awakened after 08:00 and was 
having tooth pain, Ambisol given for tooth pain. Patient ate his breakfast and lunch in his 
room.  Patient states that he is in pain and stayed in bed all day due to pain.  Tylenol, 
Naprosyn and Suboxone given for pain throughout the shift.  Patient appears depressed.



Plan :

Per Dr. Davalos, pt. continues to require a safe and supportive environment. Pt has been 
conserved and awaiting placement by [facility].


-------------------------------------------------------------------------------

Addendum: 01/15/23 at 1823 by Amanda Jordan RN

-------------------------------------------------------------------------------

Patient requests something for anxiety, states that he is hearing voices right now.  
Klonopin 2 mg, Haldol 10 mg, and Tylenol 650 given for pain, voices and anxiety.

## 2023-01-16 VITALS — SYSTOLIC BLOOD PRESSURE: 130 MMHG | DIASTOLIC BLOOD PRESSURE: 60 MMHG

## 2023-01-16 VITALS — DIASTOLIC BLOOD PRESSURE: 55 MMHG | SYSTOLIC BLOOD PRESSURE: 104 MMHG

## 2023-01-16 RX ADMIN — NICOTINE SCH PATCH: 21 PATCH, EXTENDED RELEASE TRANSDERMAL at 07:58

## 2023-01-16 RX ADMIN — DOCUSATE SODIUM SCH MG: 100 CAPSULE, LIQUID FILLED ORAL at 07:57

## 2023-01-16 RX ADMIN — DOCUSATE SODIUM SCH MG: 100 CAPSULE, LIQUID FILLED ORAL at 20:06

## 2023-01-16 RX ADMIN — BENZOCAINE PRN APPLIC: 200 SOLUTION TOPICAL at 18:25

## 2023-01-16 RX ADMIN — DIVALPROEX SODIUM SCH MG: 250 TABLET, DELAYED RELEASE ORAL at 12:42

## 2023-01-16 RX ADMIN — BUPRENORPHINE AND NALOXONE SCH TAB: 2; .5 TABLET SUBLINGUAL at 15:08

## 2023-01-16 RX ADMIN — Medication SCH PACKET: at 20:04

## 2023-01-16 RX ADMIN — BUPRENORPHINE AND NALOXONE SCH TAB: 2; .5 TABLET SUBLINGUAL at 07:56

## 2023-01-16 RX ADMIN — SODIUM CHLORIDE SCH GM: 0.9 IRRIGANT IRRIGATION at 20:04

## 2023-01-16 RX ADMIN — BENZTROPINE MESYLATE SCH MG: 1 TABLET ORAL at 20:05

## 2023-01-16 RX ADMIN — POLYETHYLENE GLYCOL 3350 SCH GM: 17 POWDER, FOR SOLUTION ORAL at 20:04

## 2023-01-16 RX ADMIN — NICOTINE POLACRILEX PRN MG: 2 LOZENGE ORAL at 01:57

## 2023-01-16 RX ADMIN — BENZTROPINE MESYLATE SCH MG: 1 TABLET ORAL at 07:56

## 2023-01-16 RX ADMIN — SODIUM CHLORIDE SCH GM: 0.9 IRRIGANT IRRIGATION at 13:36

## 2023-01-16 RX ADMIN — THERA TABS SCH EACH: TAB at 07:57

## 2023-01-16 RX ADMIN — DIVALPROEX SODIUM SCH MG: 250 TABLET, DELAYED RELEASE ORAL at 07:57

## 2023-01-16 RX ADMIN — BENZOCAINE PRN APPLIC: 200 SOLUTION TOPICAL at 15:11

## 2023-01-16 NOTE — NUR
Nursing Progress Note:



Problem:  Per 5150 pt. is here for SA by OD on street drugs after command hallucinations 
telling him to do so. Pt. states that he took "Fentanyl, black china, meth, 8 ball" in 
suicide attempt.



Interventions:  Provide medication administration & medication management; Maintained a safe 
& supportive environment; Clear & simple instructions; Direction & encouragement  regarding 
performance of ADLs; monitored behaviors & maintained clear boundaries; Patient physical 
assessment & 1:1 patient interview; Therapeutic conversation & active listening; Patient 
education & monitoring. 



Response: Received patient while he was sleeping in bed.  Entered patients room to 
administer 0800 medications.  Patient was easily aroused from sleep and took his oral 
medications without difficulty.  Patient declined breakfast and fell back to sleep.  Patient 
woke up at 1200 and sat alongside his bed before ambulating to the Community Room for lunch. 
 Patient has ecchymosis around his bilateral eyes with forehead swelling noted.  Patient 
denies pain in this area.  Patient ate lunch and took his 1300 medications before going back 
to bed to rest.  PHILIPPE Al came to speak with me at 1330 about this patient and informed me 
that his labs that were drawn for an Ammonia Level, which was drawn last night had not been 
reported to her last night to her as a Critical Result, of 71 because Lab did not report 
this as a critical result to the RN/LVN, so Mikaela was not aware of the actual Ammonia Level 
and to discontinue Depakote medication last night.  Received order from Mikaela to dc Depakote 
and start Lactulose po q6 h, and first dose was administered at 1336 today.   Per Mikaela, 
Patient is to continue Lactulose until he experiences diarrhea, then his MD should be 
informed so that he can write an order if he wants to decrease the Lactulose. Ammonia 
results drawn today were 68.  



Plan: Crisis interruption, stabilization with medication adjustment, management and 
monitoring in a safe and therapeutic environment until stable.

## 2023-01-16 NOTE — NUR
[place] Progress Note:



Problem:  Per 5150 pt. is here for SA by OD on street drugs after command hallucinations 
telling him to do so. Pt. states that he took "Fentanyl, black china, meth, 8 ball" in 
suicide attempt.



Interventions:  Provide medication administration & medication management; Maintained a safe 
& supportive environment; Clear & simple instructions; Direction & encouragement  regarding 
performance of ADLs; monitored behaviors & maintained clear boundaries; Patient physical 
assessment & 1:1 patient interview; Therapeutic conversation & active listening; Patient 
education & monitoring. 



Response: Pt was in his room napping at change of shift. Pt states he was having dreams 
about "The Lord, God is good, I'm feeling really good right now." Pt is pleasant and reports 
feeling tired and wants to continue sleeping. Pt reports he is having some constipation. Pt 
given scheduled, colace, miralax, metamucil, and lactulose this evening and advised to 
report if he has a BM this evening. Pt declines to have a snack with peers and remains in 
his room. Pt reports leg pain "I fell down a hill once when I was in High School, so I think 
that's why my leg is hurting now." Pt came out of his room shortly after HS med pass 
reporting he is having trouble sleeping and returned to bed. 



Plan: Crisis interruption, stabilization with medication adjustment, management and 
monitoring in a safe and therapeutic environment until stable.

## 2023-01-16 NOTE — NUR
Nursing Progress Note



Problem: 

Per 5150 pt. is here for SA by OD on street drugs after command hallucinations telling him 
to do so. Pt. states that he took "Fentanyl, black china, meth, 8 ball" in suicide attempt.



Interventions: 

Maintained a safe and supportive environment, ensured contract for safety, provided clear 
and simple instructions, attempted to orient to reality, monitored behavior and provided 
intervention as needed, provided active listening and positive encouragement, encouraged 
independent performance of ADLs, and maintained Q 15min safety checks. 



Response:  

Patient received awake in bed at the start of the shift.  Noted pacing the halls slowly in 
the evening.  Appears downcast and verbalizes ongoing depression due to being stuck here.  
Denies any other mental health symptoms but does make occasional delusional statements such 
as, I wish I had thought of it sooner.  To be the joker.  Then no one could hurt me.  
Patient inquiries about what he can do to speed up his placement.  Patient is educated on 
placement and encouraged to maintain good behavior while waiting.  Patient is noted 
socializing for a brief time with select peers.  Denies any facial pain and bruising is 
visible to bilateral periorbital area.  Cooperative with 1:1 assessment and medications.  
Isolates to his room for much of the evening, paces the unit briefly, then goes to bed.  
Gets up in the late evening and states, I feel like Im losing my mind.  Patient appears 
anxious and is requesting a PRN.  PRN Trazodone is given.  Patient gets up several times 
during the night.    



Plan:

Per Dr. Davalos, pt. continues to require a safe and supportive environment. Pt has been 
conserved and awaiting placement by [facility].

## 2023-01-17 VITALS — DIASTOLIC BLOOD PRESSURE: 67 MMHG | SYSTOLIC BLOOD PRESSURE: 92 MMHG

## 2023-01-17 VITALS — SYSTOLIC BLOOD PRESSURE: 114 MMHG | DIASTOLIC BLOOD PRESSURE: 73 MMHG

## 2023-01-17 LAB
FERRITIN SERPL-MCNC: 176 NG/ML (ref 26–388)
IRON SATN MFR SERPL: 234 UG/DL (ref 259–388)
IRON SATN MFR SERPL: 47 % (ref 11–46)
IRON SERPL-MCNC: 109 UG/DL (ref 53–167)
RBC # BLD AUTO: 3.91 X10'6 (ref 4.7–6.1)
RETICS/RBC NFR AUTO: 1.2 % (ref 0.5–1.5)

## 2023-01-17 RX ADMIN — BUPRENORPHINE AND NALOXONE SCH TAB: 2; .5 TABLET SUBLINGUAL at 08:00

## 2023-01-17 RX ADMIN — BENZTROPINE MESYLATE SCH MG: 1 TABLET ORAL at 20:03

## 2023-01-17 RX ADMIN — Medication SCH PACKET: at 20:04

## 2023-01-17 RX ADMIN — SODIUM CHLORIDE SCH GM: 0.9 IRRIGANT IRRIGATION at 01:17

## 2023-01-17 RX ADMIN — SODIUM CHLORIDE SCH GM: 0.9 IRRIGANT IRRIGATION at 14:55

## 2023-01-17 RX ADMIN — SODIUM CHLORIDE SCH GM: 0.9 IRRIGANT IRRIGATION at 08:30

## 2023-01-17 RX ADMIN — BUPRENORPHINE AND NALOXONE SCH TAB: 2; .5 TABLET SUBLINGUAL at 14:55

## 2023-01-17 RX ADMIN — DOCUSATE SODIUM SCH MG: 100 CAPSULE, LIQUID FILLED ORAL at 08:29

## 2023-01-17 RX ADMIN — THERA TABS SCH EACH: TAB at 08:28

## 2023-01-17 RX ADMIN — NICOTINE SCH PATCH: 21 PATCH, EXTENDED RELEASE TRANSDERMAL at 08:35

## 2023-01-17 RX ADMIN — BENZTROPINE MESYLATE SCH MG: 1 TABLET ORAL at 08:29

## 2023-01-17 NOTE — NUR
Nursing Progress Note:



Problem:  Per 5150 pt. is here for SA by OD on street drugs after command hallucinations 
telling him to do so. Pt. states that he took "Fentanyl, black china, meth, 8 ball" in 
suicide attempt.



Interventions:  Provide medication administration & medication management; maintained a safe 
& supportive environment; clear & simple instructions; direction & encouragement  regarding 
performance of ADLs; monitored behaviors & maintained clear boundaries; patient physical 
assessment & 1:1 patient interview; Therapeutic conversation & active listening; patient 
education & monitoring. 



Response: RN received pt. asleep in bed at start of shift. Pt. awoke late for breakfast and 
took all medications except for his suboxone. Pt. ate all his meals in his room today due to 
feeling not well, pt. reports he did not sleep last night due to demons tormenting him. 
Pt. napped intermittently throughout the day. Pt. isolated to this room. 1:1 done at 
bedside, pt. appears depressed, pt. denies SI/HI, auditory hallucinations but does report 
seeing shadows. Pt. reports depression due to continued hospitalization. Pt. states, I 
dont think Im ever going to get out of here. 



Plan: Crisis interruption, stabilization with medication adjustment, management and 
monitoring in a safe and therapeutic environment until stable.

## 2023-01-18 VITALS — SYSTOLIC BLOOD PRESSURE: 106 MMHG | DIASTOLIC BLOOD PRESSURE: 60 MMHG

## 2023-01-18 VITALS — DIASTOLIC BLOOD PRESSURE: 48 MMHG | SYSTOLIC BLOOD PRESSURE: 102 MMHG

## 2023-01-18 RX ADMIN — Medication SCH PACKET: at 20:48

## 2023-01-18 RX ADMIN — BENZTROPINE MESYLATE SCH MG: 1 TABLET ORAL at 20:46

## 2023-01-18 RX ADMIN — THERA TABS SCH EACH: TAB at 08:39

## 2023-01-18 RX ADMIN — NICOTINE SCH PATCH: 21 PATCH, EXTENDED RELEASE TRANSDERMAL at 08:00

## 2023-01-18 RX ADMIN — MAGNESIUM HYDROXIDE PRN ML: 400 SUSPENSION ORAL at 12:11

## 2023-01-18 RX ADMIN — BUPRENORPHINE AND NALOXONE SCH TAB: 2; .5 TABLET SUBLINGUAL at 15:15

## 2023-01-18 RX ADMIN — BENZOCAINE PRN APPLIC: 200 SOLUTION TOPICAL at 01:26

## 2023-01-18 RX ADMIN — BUPRENORPHINE AND NALOXONE SCH TAB: 2; .5 TABLET SUBLINGUAL at 08:39

## 2023-01-18 RX ADMIN — BENZTROPINE MESYLATE SCH MG: 1 TABLET ORAL at 08:39

## 2023-01-18 NOTE — NUR
Nursing Progress Note:



Problem:  Per 5150 pt. is here for SA by OD on street drugs after command hallucinations 
telling him to do so. Pt. states that he took "Fentanyl, black china, meth, 8 ball" in 
suicide attempt.



Interventions:  Provide medication administration & medication management; maintained a safe 
& supportive environment; clear & simple instructions; direction & encouragement  regarding 
performance of ADLs; monitored behaviors & maintained clear boundaries; patient physical 
assessment & 1:1 patient interview; Therapeutic conversation & active listening; patient 
education & monitoring. 



Response: Pt was c/o not feeling good at change of shift and was pacing in his room and 
appeared to be anxious. Pt was provided with PRN klonopin. Pt states he wants to go home and 
is tired of being here. Pt had med changes this evening and took HS meds, Pt c/o tooth pain 
and requested prn tylenol. Pt had snacks and returned to bed. Pt had difficulty sleeping was 
provided with PRN trazodone, also received repeat dose and continued to have interrupted 
sleep during the night. At one point patient reported he was "freaking out" and began 
punching walls in his room. Pt was redirected and provided with prn haldol with good effect. 
Pt woke c/o "spiritual warfare and seeing a little girl standing in the corner of the room." 
Pt is mumbling his words and comes to the nurses station multiple times during the night for 
various things, snacks, tooth pain, seeing ghosts etc and returns to his room to nap. Early 
this morning he began asking about breakfast is smiling and pleasant and reports he is 
feeling better. 



Plan: Crisis interruption, stabilization with medication adjustment, management and 
monitoring in a safe and therapeutic environment until stable.

## 2023-01-18 NOTE — NUR
Nursing Progress Note:



Problem:  Per 5150 pt. is here for SA by OD on street drugs after command hallucinations 
telling him to do so. Pt. states that he took "Fentanyl, black china, meth, 8 ball" in 
suicide attempt.



Interventions:  Provide medication administration & medication management; maintained a safe 
& supportive environment; clear & simple instructions; direction & encouragement  regarding 
performance of ADLs; monitored behaviors & maintained clear boundaries; patient physical 
assessment & 1:1 patient interview; Therapeutic conversation & active listening; patient 
education & monitoring. 



Response: RN received pt. asleep in bed at start of shift. Pt. awoke for breakfast and took 
AM medications and went back to sleep. Pt. remained in bed and napped intermittently 
throughout the AM. After lunch pt. observed pacing the [facility]h and socializing minimally with 
staff and peers. 1:1 done at bedside, pt. appears depressed and reports feeling hopeless 
about being in the hospital, states, I think Ill be here forever. Pt. reports seeing 
demons in his room at night. Pt. states, theres a lot of spiritual stuff going on. Pt. 
isolated to his room during the evening.  



Plan: Crisis interruption, stabilization with medication adjustment, management and 
monitoring in a safe and therapeutic environment until stable.

## 2023-01-18 NOTE — NUR
Pt. wanted another tray after eat breakfast, pt. told to wait and became aggressive trying 
to force his way into the breakfast cart. Pt. escorted back to his room and offered PO 
medication, pt. knocked medication out of RN's hand and then held up his hands in a fighting 
posture. Provider contacted and pt. given IM Haldol 10mg, Ativan 2mg, and Benadryl 50mg. Pt. 
then attempted to elope out of the front door. Pt. escorted into isolation room using BVP 
method. Pt. then fell asleep in isolation room.

## 2023-01-19 VITALS — DIASTOLIC BLOOD PRESSURE: 80 MMHG | SYSTOLIC BLOOD PRESSURE: 123 MMHG

## 2023-01-19 VITALS — SYSTOLIC BLOOD PRESSURE: 138 MMHG | DIASTOLIC BLOOD PRESSURE: 92 MMHG

## 2023-01-19 RX ADMIN — THERA TABS SCH EACH: TAB at 07:53

## 2023-01-19 RX ADMIN — BENZTROPINE MESYLATE SCH MG: 1 TABLET ORAL at 07:53

## 2023-01-19 RX ADMIN — BENZOCAINE PRN APPLIC: 200 SOLUTION TOPICAL at 17:14

## 2023-01-19 RX ADMIN — BENZTROPINE MESYLATE SCH MG: 1 TABLET ORAL at 11:04

## 2023-01-19 RX ADMIN — BENZTROPINE MESYLATE SCH MG: 1 TABLET ORAL at 21:16

## 2023-01-19 RX ADMIN — BUPRENORPHINE AND NALOXONE SCH TAB: 2; .5 TABLET SUBLINGUAL at 07:54

## 2023-01-19 RX ADMIN — Medication SCH PACKET: at 21:15

## 2023-01-19 RX ADMIN — BUPRENORPHINE AND NALOXONE SCH TAB: 2; .5 TABLET SUBLINGUAL at 15:48

## 2023-01-19 RX ADMIN — BUPRENORPHINE AND NALOXONE SCH TAB: 2; .5 TABLET SUBLINGUAL at 11:05

## 2023-01-19 RX ADMIN — THERA TABS SCH EACH: TAB at 08:00

## 2023-01-19 RX ADMIN — NICOTINE SCH PATCH: 21 PATCH, EXTENDED RELEASE TRANSDERMAL at 08:00

## 2023-01-19 NOTE — NUR
F/u 1/19: Pt PO declining now fluctuating ~43% avg meals w/ occasional 

100% receiving double eggs WB and double entree BIDLD in addition to 

sometimes multiple snacks per day; partially meeting needs. EMANUEL d/w RN pt 

PO trends; per RN pt meds adjustments causing decompensation at time in 

addition to having a tooth ache pending dentist visit in future. Per RN, 

pt likely accepting of smoothies TIDWM. Given current PO trends will stop 

double entree BIDLD to minimize food waste and add smoothie TIDWM; dietary 

notified. LBM 1/18. Will continue to follow.

Recommendations:

1) Continue Vegetarian diet per pt request; double eggs WB; smoothies 

TIDWM; encourage PO

2) resume double entree BIDLD once PO trends improve to avoid food waste

3) MVI supplementation per MD

4) Bowel care PRN

5) Weekly scaled weights

-------------------------------------------------------------------------------

Addendum: 01/19/23 at 0825 by Jordan Godfrey RD

-------------------------------------------------------------------------------

Amended: Links added.

## 2023-01-19 NOTE — NUR
Nursing Progress Note:



Problem:  Per 5150 pt. is here for SA by OD on street drugs after command hallucinations 
telling him to do so. Pt. states that he took "Fentanyl, black china, meth, 8 ball" in 
suicide attempt.



Interventions:  Provide medication administration & medication management; maintained a safe 
& supportive environment; clear & simple instructions; direction & encouragement  regarding 
performance of ADLs; monitored behaviors & maintained clear boundaries; patient physical 
assessment & 1:1 patient interview; Therapeutic conversation & active listening; patient 
education & monitoring. 



Response: Patient was found sitting in bed at beginning of shift. Patient continued to get 
up and pace around unit making nonsensical speech and delusional statements. Patient 
continued to ask when he is leaving and stating he is gong to die here. Patient took all 
night medications and participated in snack before retuning back to room. Patient continues 
to get up periodically and pace halls asking questions under low mumbled voice. Patient 
eventually took prn Haldol 5mg and returned to room. 





Plan: Crisis interruption, stabilization with medication adjustment, management and 
monitoring in a safe and therapeutic environment until stable.

## 2023-01-19 NOTE — NUR
Nursing Progress Note      



Problem: Per 5150 pt. is here for SA by OD on street drugs after command hallucinations 
telling him to do so. Pt. states that he took "Fentanyl, black china, meth, 8 ball" in 
suicide attempt. Pt. states he feels hopeless. Pt. is also delusional, stating, "they won't 
give me my billion dollars". Pt. states, "I tried killing myself because Im a free renetta. 
They dont love me but I love them. Yes I do."



Interventions: 1:1 assessment, therapeutic conversation, active listening, ensured contract 
for safety, medication administration/education/monitoring, behavior monitoring and 
intervention as needed; reality orientation, distraction, redirection, limit setting, 
encouragement, positive reinforcement, and Q15 minute safety checks.



Response: Pt slept through breakfast this morning. He did not get up until around 1100. This 
nurse attempted to give his meds earlier but pt stated, "just leave me to the Devil!"  Pt 
was given his morning meds late around 1105. His 0800 Haldol and Ativan were held as they 
were due again at 1200. ZULEMA Rodriguez was notified of pt's meds being given late. Pt was given 
Tylenol 650mg at 1107 for c/o 6/10 dental pain with some effect. Pt initially denied AH/VH 
but later in the shift reported that the Suboxone makes him see things like demons and that 
it also makes him feel "funny" and sick, pt reports it makes him not want to eat and he 
throws up sometimes. Pt states that he doesn't want to be on Suboxone anymore. Pt appears 
depressed, pt was given a one time dose of Zoloft 50 mg today at 1402. Pt requested Tylenol 
again later in the afternoon for dental pain. Pt rated his pain at "666." Pt was given 
Tylenol 650 mg again at 1550. Pt has lost around 4 kg since last week. Pt has a dentis 
appointment next week on 1/25.



Plan: Pt is conserved and awaiting placement.

## 2023-01-20 VITALS — SYSTOLIC BLOOD PRESSURE: 125 MMHG | DIASTOLIC BLOOD PRESSURE: 75 MMHG

## 2023-01-20 VITALS — SYSTOLIC BLOOD PRESSURE: 146 MMHG | DIASTOLIC BLOOD PRESSURE: 96 MMHG

## 2023-01-20 RX ADMIN — BUPRENORPHINE AND NALOXONE SCH TAB: 2; .5 TABLET SUBLINGUAL at 07:34

## 2023-01-20 RX ADMIN — BENZTROPINE MESYLATE SCH MG: 1 TABLET ORAL at 07:34

## 2023-01-20 RX ADMIN — THERA TABS SCH EACH: TAB at 07:34

## 2023-01-20 RX ADMIN — BENZOCAINE PRN APPLIC: 200 SOLUTION TOPICAL at 08:34

## 2023-01-20 RX ADMIN — Medication SCH PACKET: at 20:20

## 2023-01-20 RX ADMIN — DIVALPROEX SODIUM SCH MG: 250 TABLET, DELAYED RELEASE ORAL at 20:17

## 2023-01-20 RX ADMIN — BENZOCAINE PRN APPLIC: 200 SOLUTION TOPICAL at 21:27

## 2023-01-20 RX ADMIN — BENZTROPINE MESYLATE SCH MG: 1 TABLET ORAL at 20:15

## 2023-01-20 RX ADMIN — BUPRENORPHINE AND NALOXONE SCH TAB: 2; .5 TABLET SUBLINGUAL at 14:25

## 2023-01-20 RX ADMIN — NICOTINE SCH PATCH: 21 PATCH, EXTENDED RELEASE TRANSDERMAL at 07:42

## 2023-01-20 NOTE — NUR
Nursing Progress Note:



Problem:  Per 5150 pt. is here for SA by OD on street drugs after command hallucinations 
telling him to do so. Pt. states that he took "Fentanyl, black china, meth, 8 ball" in 
suicide attempt.



Interventions:  Provide medication administration & medication management; maintained a safe 
& supportive environment; clear & simple instructions; direction & encouragement  regarding 
performance of ADLs; monitored behaviors & maintained clear boundaries; patient physical 
assessment & 1:1 patient interview; Therapeutic conversation & active listening; patient 
education & monitoring. 



Response: received pt. walking the hallway at shift change. Pt. is observed socializing with 
staff. Walks the hallway with headphones occasionally.  Isolates to himself. Participated in 
snack. Compliant with medications. Denies SI, HI, AH, VH. Continues to have depression. 
States he "had a good day and slept most of the day". Nicotine patch removed. At 1105 pt. 
came out of room stating he had a nightmare and asked for hot cocoa. Around 1123 pt. was out 
of room and asked to have something for sleep.PRN trazodone was administered with 
effectiveness. Pt. appears to be sleeping. 



Plan: Crisis interruption, stabilization with medication adjustment, management and 
monitoring in a safe and therapeutic environment until stable.


-------------------------------------------------------------------------------

Addendum: 01/20/23 at 0615 by Pilar Mercado RN

-------------------------------------------------------------------------------

Pt. has been awake and walking the halls since 4am. Recently, became agitated  w/escalating 
behavior. KVNG Rodriguez was contacted and approved order for 10mg haldol, 2mg ativan and 50mg 
benadryl.  

-------------------------------------------------------------------------------

Addendum: 01/20/23 at 0629 by Pilar Mercado RN

-------------------------------------------------------------------------------

10mg haldol, 2mg ativan and 50mg benadryl  administered to patient

## 2023-01-20 NOTE — NUR
Pt manic, verbalizing delusional content about [employer], [employer], ritual sacrifice 
of babies. Pt distressed, tormented asking for help, he asked Dr Davalos for a med to calm 
his mind. This RN discussed recent lab findings and discontinuance of pt's mood stabilizer 
Depakote 3 days ago. Dr Davalos ordered Depakote 750 mg now then BID, recheck ammonia 
level in 5 days.

## 2023-01-20 NOTE — NUR
Nursing Progress Note      



Problem: Per 5150 pt. is here for SA by OD on street drugs after command hallucinations 
telling him to do so. Pt. states that he took "Fentanyl, black china, meth, 8 ball" in 
suicide attempt. Pt. states he feels hopeless. Pt. is also delusional, stating, "they won't 
give me my billion dollars". Pt. states, "I tried killing myself because Im a free renetta. 
They dont love me but I love them. Yes I do."



Interventions: 1:1 assessment, therapeutic conversation, active listening, ensured contract 
for safety, medication administration/education/monitoring, behavior monitoring and 
intervention as needed; reality orientation, distraction, redirection, limit setting, 
encouragement, positive reinforcement, and Q15 minute safety checks.



Response: Pt was awake and yelling during shift change. He was religiously preoccupied and 
ranting things like, "[place] will fall!" Noc shift obtained orders for one time doses of PO 
Haldol 10 mg, Benadryl 50 mg, and Ativan 2 mg given at 0624. Held pt's routine 0800 Ativan 
and Haldol. Pt presented as hypomanic today despite taking all other routine meds. He was 
restless and hyperverbal, his speech was pressured and tangential. Pt rapidly paced the unit 
listening to radio headphones. Pt was labile going from smiling broadly and laughing to 
moments of sadness/near tearfulness. Pt was overly affectionate with staff today, calling a 
female nurse "moms" and a male nurse "pops." He hugged his provider ZULEMA Rodriguez and told this 
RN, "I love you." Pt ruminated on his past drug use and mistakes, apologized and promised to 
do better. Pt made delusional statements of a Druze nature focusing on Abel, the Devil, 
and demons. Discussed pt's labile, hypomanic, delusional state with ZULEMA Rodriguez. Pt is no longer 
on a mood stabilizer. His Depakote was D/c'd due to elevated ammonia levels. Pt was 
cooperative and able to be redirected despite his increased energy and mostly elevated mood.



Plan: Pt is conserved and awaiting placement. 

-------------------------------------------------------------------------------

Addendum: 01/20/23 at 1633 by Dolly Kebede RN (Lee)

-------------------------------------------------------------------------------

Pt c/o 7/10 tooth pain and was given PRN Tylenol 650 mg at 0742 & again at 1531 with partial 
effect. He was given PRN Anbesol topically at 0834. He requested ibuprofen and an order was 
obtained for PRN ibuprofen 600 mg TID, given at 1109 with good effect. Pt was cooperative 
with his Suboxone today as he wanted it to help with his dental pain.

## 2023-01-21 VITALS — DIASTOLIC BLOOD PRESSURE: 80 MMHG | SYSTOLIC BLOOD PRESSURE: 126 MMHG

## 2023-01-21 VITALS — DIASTOLIC BLOOD PRESSURE: 50 MMHG | SYSTOLIC BLOOD PRESSURE: 101 MMHG

## 2023-01-21 RX ADMIN — DIVALPROEX SODIUM SCH MG: 250 TABLET, DELAYED RELEASE ORAL at 21:30

## 2023-01-21 RX ADMIN — BENZTROPINE MESYLATE SCH MG: 1 TABLET ORAL at 07:20

## 2023-01-21 RX ADMIN — Medication SCH PACKET: at 21:00

## 2023-01-21 RX ADMIN — DIVALPROEX SODIUM SCH MG: 250 TABLET, DELAYED RELEASE ORAL at 07:20

## 2023-01-21 RX ADMIN — NICOTINE SCH PATCH: 14 PATCH, EXTENDED RELEASE TRANSDERMAL at 07:25

## 2023-01-21 RX ADMIN — BUPRENORPHINE AND NALOXONE SCH TAB: 2; .5 TABLET SUBLINGUAL at 14:00

## 2023-01-21 RX ADMIN — THERA TABS SCH EACH: TAB at 07:21

## 2023-01-21 RX ADMIN — NICOTINE POLACRILEX PRN MG: 2 LOZENGE ORAL at 13:09

## 2023-01-21 RX ADMIN — BENZTROPINE MESYLATE SCH MG: 1 TABLET ORAL at 21:30

## 2023-01-21 RX ADMIN — BUPRENORPHINE AND NALOXONE SCH TAB: 2; .5 TABLET SUBLINGUAL at 07:22

## 2023-01-21 RX ADMIN — BENZOCAINE PRN APPLIC: 200 SOLUTION TOPICAL at 08:36

## 2023-01-21 NOTE — NUR
Nursing Progress Note:



Problem:  Per 5150 pt. is here for SA by OD on street drugs after command hallucinations 
telling him to do so. Pt. states that he took "Fentanyl, black china, meth, 8 ball" in 
suicide attempt.



Interventions:  Provide medication administration & medication management; maintained a safe 
& supportive environment; clear & simple instructions; direction & encouragement  regarding 
performance of ADLs; monitored behaviors & maintained clear boundaries; patient physical 
assessment & 1:1 patient interview; Therapeutic conversation & active listening; patient 
education & monitoring. 



Response: received pt. awake at shift change. He approached this LVN and apologized for his 
behavior the night before. Pt is labile, hyperverbal, and tangential speech. Requested PRN 
Tylenol for L ankle pain. Compliant with bedtime medications. Nicotine patch removed. 
Participated in snack. Continues rapping and pacing the halls. Pt is observed repeatedly 
going into room and coming back out within a few minutes. PRN Anbesol provided for c/o tooth 
pain. Pt. appears to be restless and painful.  Went back into room for short period.  
Offered pt. PRN trazodone and it was administered. 

A while later patient was pacing the halls again. PRN haldol provided for agitation. 

Around 2300 pt is out of room states he is in pain from his tooth. CN contacted the  and 
was approved a one time dose of Norco 5/325 mg . Pt was administered Norco 5/325mg by this 
LVN. Remained in room until 0130, came out of room, looked at the clock and walked back into 
room. Pt. is now in bed and appears to be resting.



Plan: Crisis interruption, stabilization with medication adjustment, management and 
monitoring in a safe and therapeutic environment until stable.

## 2023-01-21 NOTE — NUR
Nursing Progress Note:



Problem : Per 5150 pt. is here for SA by OD on street drugs after command hallucinations 
telling him to do so. Pt. states that he took "Fentanyl, black china, meth, 8 ball" in 
suicide attempt. Pt. states he feels hopeless. Pt. is also delusional, stating, "they won't 
give me my billion dollars". Pt. states, "I tried killing myself because Im a free renetta. 
They dont love me but I love them. Yes I do."



Interventions : Maintained a safe and supportive environment, ensured contract for safety, 
provided clear and simple instructions, monitored behavior and provided intervention as 
needed, provided active listening and positive encouragement,and maintained Q 15min safety 
checks. 



Response : Received pt. up pacing the unit at the beginning of the shift, he went around 
greeting each staff member in what appeared to be a hypomanic manner, stating loudly, "Good 
morning mixed family!" Pt. requested his medication and appears to be gaining some insight 
into his mental illness. He continues to slur his words together and is difficult to 
understand at times and his thought process remains somewhat disorganized. Pt. makes 
frequent hyper Methodist statements regarding going to heaven or hell. He appears to be 
gaining some increased self control over his behaviors AEB reporting wanting to fight one of 
his fellow peers and then stating, "God wouldn't want me to do that."  Pt. continued to pace 
restlessly throughout the shift and was very labile at intervals requiring frequent 
redirection and attention from staff. Pt. was at one point triggered by a female peer who 
stepped in front of him to ask a question. Pt. yelled out agitatedly, "She is so rude! I'm 
going to have my sister beat her up, she's gangster!" Staff was able to redirect pt. and 
de-escalate his behavior and he later apologized to this peer. 

Pt. attended the patio with others in the afternoon and afterwards began to complain about 
left leg pain from an old injury. PRN Tylenol was administered, and this writer encouraged 
pt. to sit and rest his legs, however he refused to comply. Pt. continued to pace throughout 
the day becoming increasingly agitated, yelling out, and punching exit doors at intervals. 
PRN Haldol was administered with some electiveness. Pt. began demanding an x-ray of his leg 
and stated agitatedly, "I'm going to keep doing this until they arrest me!" Pt's behaviors 
and request for an x-ray were endorsed to ZULEMA Rodriguez. Pt. does not exhibit any s/s of 
difficulty with ambulation and continued to refuse to sit and rest his leg. He perseverated 
on this for some time, but was finally able to be redirected and apologized for his 
behaviors, will continue to monitor closely. 



Plan :Pt. continues to require a safe and supportive environment and medication adjustments.

## 2023-01-22 VITALS — SYSTOLIC BLOOD PRESSURE: 151 MMHG | DIASTOLIC BLOOD PRESSURE: 74 MMHG

## 2023-01-22 VITALS — SYSTOLIC BLOOD PRESSURE: 98 MMHG | DIASTOLIC BLOOD PRESSURE: 57 MMHG

## 2023-01-22 RX ADMIN — BENZOCAINE PRN APPLIC: 200 SOLUTION TOPICAL at 12:33

## 2023-01-22 RX ADMIN — BENZTROPINE MESYLATE SCH MG: 1 TABLET ORAL at 20:00

## 2023-01-22 RX ADMIN — BENZTROPINE MESYLATE SCH MG: 1 TABLET ORAL at 07:16

## 2023-01-22 RX ADMIN — BUPRENORPHINE AND NALOXONE SCH TAB: 2; .5 TABLET SUBLINGUAL at 07:17

## 2023-01-22 RX ADMIN — BENZOCAINE PRN APPLIC: 200 SOLUTION TOPICAL at 21:25

## 2023-01-22 RX ADMIN — DIVALPROEX SODIUM SCH MG: 250 TABLET, DELAYED RELEASE ORAL at 20:00

## 2023-01-22 RX ADMIN — THERA TABS SCH EACH: TAB at 07:16

## 2023-01-22 RX ADMIN — BUPRENORPHINE AND NALOXONE SCH TAB: 2; .5 TABLET SUBLINGUAL at 14:43

## 2023-01-22 RX ADMIN — DIVALPROEX SODIUM SCH MG: 250 TABLET, DELAYED RELEASE ORAL at 07:16

## 2023-01-22 RX ADMIN — NICOTINE SCH PATCH: 14 PATCH, EXTENDED RELEASE TRANSDERMAL at 07:17

## 2023-01-22 RX ADMIN — Medication SCH PACKET: at 20:00

## 2023-01-22 NOTE — NUR
NURSING PROGRESS NOTE: 



Problem : Per 5150 pt. is here for SA by OD on street drugs after command hallucinations 
telling him to do so. Pt. states that he took "Fentanyl, black china, meth, 8 ball" in 
suicide attempt. Pt. states he feels hopeless. Pt. is also delusional, stating, "they won't 
give me my billion dollars". Pt. states, "I tried killing myself because Im a free renetta. 
They dont love me but I love them. Yes I do."



Interventions :  One on one with patient to assess mood d/o, administered scheduled and PRN 
medication as ordered with no adverse side effects, established and maintained clear 
boundaries, oriented to reality, monitored behavior and provided intervention as needed,  
Q15 min safety rounds.



Response : Pt was resting in bed at change of shift. Pt woke for HS meds, pt denies S/I, 
denies a/vh. Pt took HS meds and came out of his room and briefly socialized with peers and 
had a snack. Pt c/o 10/10 pain in his mouth from his tooth and was given prn anbesol and 
tylenol. Pt given PRN trazodone with repeat dose for sleep. Pt went back to bed. Pt is 
pleasant during interactions this evening.



Plan:  Patient is LPS conserved and awaiting placement. Pt continues to be delusional or 
tangential. Pt continues to exhibit poor insight and judgment. Pt continues to require 
titration of medication in a safe and supportive environment.

## 2023-01-22 NOTE — NUR
NURSING PROGRESS NOTE: 



Problem : Per 5150 pt. is here for SA by OD on street drugs after command hallucinations 
telling him to do so. Pt. states that he took "Fentanyl, black china, meth, 8 ball" in 
suicide attempt. Pt. states he feels hopeless. Pt. is also delusional, stating, "they won't 
give me my billion dollars". Pt. states, "I tried killing myself because Im a free renetta. 
They dont love me but I love them. Yes I do."



Interventions :  One on one with patient to assess mood d/o, administered scheduled and PRN 
medication as ordered with no adverse side effects, established and maintained clear 
boundaries, oriented to reality, monitored behavior and provided intervention as needed,  
Q15 min safety rounds.



Response : Received patient sleeping at shift change, pt woke before 0700 and requested 
morning medications. Pts mood went from pleasant to manic Pt was twirling and dancing down 
the singh, rapping, at one point running. Pt was saying I am in a good mood!  Pt required 
redirection as he would stand talking loudly to the closed observation room door, agitating 
the patient inside.  Pt presents with a bright affect, has rapid, disorganized speech. Pt at 
one point asked staff to call me Lucifer. When told no pt stated Yes he is bad, pt then 
closed his eyes and started praying. Pt is difficult to understand with his slurred, mumbled 
hyper verbal speech. PRN Haldol was given without effect. DEMETRIUS Rodriguez was on unit and ordered 
one time dose of Ativan 2mg. PRN was effective. Pt calmed pacing unit, however was still 
unable to sit and watch any part of the football game. Pt c/o toothache pain, PRN Tylenol, 
Anbesol and scheduled Motrin were administered. Pt has dentist appointment next week. 



PT HAS PRN ATIVAN 2MG ORDERED UNTIL MANIC EPISODE SUBSIDES. 



Plan:  Patient is LPS conserved and awaiting placement. Pt continues to be delusional or 
tangential. Pt continues to exhibit poor insight and judgment. Pt continues to require 
titration of medication in a safe and supportive environment.

## 2023-01-22 NOTE — NUR
Nursing Progress Note:



Problem : Per 5150 pt. is here for SA by OD on street drugs after command hallucinations 
telling him to do so. Pt. states that he took "Fentanyl, black china, meth, 8 ball" in 
suicide attempt. Pt. states he feels hopeless. Pt. is also delusional, stating, "they won't 
give me my billion dollars". Pt. states, "I tried killing myself because Im a free renetta. 
They dont love me but I love them. Yes I do."



Interventions : Maintained a safe and supportive environment, ensured contract for safety, 
provided clear and simple instructions, monitored behavior and provided intervention as 
needed, provided active listening and positive encouragement,and maintained Q 15min safety 
checks. 



Response :Patient was observed pacing the unit following shift change. Light socialization 
only. Speech is audible but some slurring. The patient complains of feeling depressed. The 
usual complaint of teeth and gum pain is present. The patient mostly linear. He apologizes 
for his recent outbursts and bad behavior. He is cooperative and medication compliant. 





Plan :Pt. continues to require a safe and supportive environment and medication adjustments.

## 2023-01-23 VITALS — DIASTOLIC BLOOD PRESSURE: 54 MMHG | SYSTOLIC BLOOD PRESSURE: 106 MMHG

## 2023-01-23 VITALS — SYSTOLIC BLOOD PRESSURE: 113 MMHG | DIASTOLIC BLOOD PRESSURE: 67 MMHG

## 2023-01-23 RX ADMIN — Medication SCH PACKET: at 20:15

## 2023-01-23 RX ADMIN — NICOTINE POLACRILEX PRN MG: 2 LOZENGE ORAL at 17:55

## 2023-01-23 RX ADMIN — BENZOCAINE PRN APPLIC: 200 SOLUTION TOPICAL at 15:18

## 2023-01-23 RX ADMIN — NICOTINE SCH PATCH: 14 PATCH, EXTENDED RELEASE TRANSDERMAL at 08:42

## 2023-01-23 RX ADMIN — BENZTROPINE MESYLATE SCH MG: 1 TABLET ORAL at 20:15

## 2023-01-23 RX ADMIN — BENZTROPINE MESYLATE SCH MG: 1 TABLET ORAL at 08:22

## 2023-01-23 RX ADMIN — BUPRENORPHINE AND NALOXONE SCH TAB: 2; .5 TABLET SUBLINGUAL at 08:22

## 2023-01-23 RX ADMIN — DIVALPROEX SODIUM SCH MG: 250 TABLET, DELAYED RELEASE ORAL at 20:15

## 2023-01-23 RX ADMIN — DIVALPROEX SODIUM SCH MG: 250 TABLET, DELAYED RELEASE ORAL at 08:21

## 2023-01-23 RX ADMIN — BUPRENORPHINE AND NALOXONE SCH TAB: 2; .5 TABLET SUBLINGUAL at 15:18

## 2023-01-23 RX ADMIN — THERA TABS SCH EACH: TAB at 08:22

## 2023-01-23 NOTE — NUR
NURSING PROGRESS NOTE: 



Problem : Per 5150 pt. is here for SA by OD on street drugs after command hallucinations 
telling him to do so. Pt. states that he took "Fentanyl, black china, meth, 8 ball" in 
suicide attempt. Pt. states he feels hopeless. Pt. is also delusional, stating, "they won't 
give me my billion dollars". Pt. states, "I tried killing myself because Im a free renetta. 
They dont love me but I love them. Yes I do."



Interventions :  One on one with patient to assess mood d/o, administered scheduled and PRN 
medication as ordered with no adverse side effects, established and maintained clear 
boundaries, oriented to reality, monitored behavior and provided intervention as needed,  
Q15 min safety rounds.



Response : Pt was walking in the halls at change of shift. Pt approached nurses station and 
states he is in a good mood. "I just feel really good! Im not sleeping too much and I can do 
more stuff." Pt spent time napping in his room and walking in the hallway. Pt took HS meds, 
c/o tooth pain 8/10 and requested anbesol. Pt went to sleep shortly after taking HS meds.



Plan:  Patient is LPS conserved and awaiting placement. Pt continues to be delusional or 
tangential. Pt continues to exhibit poor insight and judgment. Pt continues to require 
titration of medication in a safe and supportive environment.

## 2023-01-23 NOTE — NUR
NURSING PROGRESS NOTE: 



Problem : Per 5150 pt. is here for SA by OD on street drugs after command hallucinations 
telling him to do so. Pt. states that he took "Fentanyl, black china, meth, 8 ball" in 
suicide attempt. Pt. states he feels hopeless. Pt. is also delusional, stating, "they won't 
give me my billion dollars". Pt. states, "I tried killing myself because Im a free gary. 
They dont love me but I love them. Yes I do."



Interventions :  One on one with patient to assess mood d/o, administered scheduled and PRN 
medication as ordered with no adverse side effects, established and maintained clear 
boundaries, oriented to reality, monitored behavior and provided intervention as needed,  
Q15 min safety rounds.



Response : Received patient sleeping at shift change, pt woke before 0700 and requested 
morning medications. Pt was pleasant this morning not as manic as yesterday, still some 
dancing down the singh and rapid speech and racing thoughts.  Pt received call from his 
brother telling him their uncle was poisoned by Fentanyl and passed away. Pt at first was 
saying I am going to kill whoever did this, Pt then went into tears at this point patient 
requested something for anxiety. PRN Ativan was administered with effect. Pt stated I 
just have to leave to the [facility]. I will just have to pray for him. Pt later asked 
writer should I tell my son about being a Gary. What happens if he finds out and gets 
mad at me? I am not proud of it. Writer attempted to reorient pt to reality, I am 
telling the truth. Pt continues to c/o tooth pain, treated with PRN Anbesol, Motrin, 
Tylenol.  No other behaviors as of this writing. Pt has a dentist appointment on 1/25/23. 



Plan:  Patient is LPS conserved and awaiting placement. Pt continues to be delusional or 
tangential. Pt continues to exhibit poor insight and judgment. Pt continues to require 
titration of medication in a safe and supportive environment.


-------------------------------------------------------------------------------

Addendum: 01/23/23 at 1544 by Amy Baker RN

-------------------------------------------------------------------------------

Pt requested shower, however when came out a towel was wet, but the floor wasn't.

-------------------------------------------------------------------------------

Addendum: 01/23/23 at 1558 by Amy Baker RN

-------------------------------------------------------------------------------

Pt napping comfortably on bed, rr even and unlabored. 

-------------------------------------------------------------------------------

Addendum: 01/23/23 at 1714 by Amy Baker RN

-------------------------------------------------------------------------------

Held 1600 Ativan - pt sedated. No behaviors. 

-------------------------------------------------------------------------------

Addendum: 01/23/23 at 1725 by Amy Baker RN

-------------------------------------------------------------------------------

Pt woke up from his nap feeling a little groggy, but states "I feel better, I feel happy."

## 2023-01-24 VITALS — DIASTOLIC BLOOD PRESSURE: 84 MMHG | SYSTOLIC BLOOD PRESSURE: 143 MMHG

## 2023-01-24 VITALS — SYSTOLIC BLOOD PRESSURE: 138 MMHG | DIASTOLIC BLOOD PRESSURE: 78 MMHG

## 2023-01-24 RX ADMIN — DOCUSATE SODIUM SCH MG: 100 CAPSULE, LIQUID FILLED ORAL at 08:49

## 2023-01-24 RX ADMIN — DOCUSATE SODIUM SCH MG: 100 CAPSULE, LIQUID FILLED ORAL at 20:06

## 2023-01-24 RX ADMIN — NICOTINE POLACRILEX PRN MG: 2 LOZENGE ORAL at 18:14

## 2023-01-24 RX ADMIN — NICOTINE POLACRILEX PRN MG: 2 LOZENGE ORAL at 13:30

## 2023-01-24 RX ADMIN — BUPRENORPHINE AND NALOXONE SCH TAB: 2; .5 TABLET SUBLINGUAL at 08:16

## 2023-01-24 RX ADMIN — BENZTROPINE MESYLATE SCH MG: 1 TABLET ORAL at 20:08

## 2023-01-24 RX ADMIN — NICOTINE SCH PATCH: 14 PATCH, EXTENDED RELEASE TRANSDERMAL at 07:54

## 2023-01-24 RX ADMIN — MAGNESIUM HYDROXIDE PRN ML: 400 SUSPENSION ORAL at 08:16

## 2023-01-24 RX ADMIN — Medication SCH MMU: at 20:08

## 2023-01-24 RX ADMIN — NICOTINE POLACRILEX PRN MG: 2 LOZENGE ORAL at 11:23

## 2023-01-24 RX ADMIN — NICOTINE POLACRILEX PRN MG: 2 LOZENGE ORAL at 08:52

## 2023-01-24 RX ADMIN — SULFAMETHOXAZOLE AND TRIMETHOPRIM SCH TAB: 800; 160 TABLET ORAL at 12:21

## 2023-01-24 RX ADMIN — DIVALPROEX SODIUM SCH MG: 250 TABLET, DELAYED RELEASE ORAL at 07:53

## 2023-01-24 RX ADMIN — SULFAMETHOXAZOLE AND TRIMETHOPRIM SCH TAB: 800; 160 TABLET ORAL at 20:08

## 2023-01-24 RX ADMIN — THERA TABS SCH EACH: TAB at 07:53

## 2023-01-24 RX ADMIN — Medication SCH PACKET: at 20:07

## 2023-01-24 RX ADMIN — BUPRENORPHINE AND NALOXONE SCH TAB: 2; .5 TABLET SUBLINGUAL at 15:33

## 2023-01-24 RX ADMIN — DIVALPROEX SODIUM SCH MG: 250 TABLET, DELAYED RELEASE ORAL at 20:07

## 2023-01-24 RX ADMIN — BENZTROPINE MESYLATE SCH MG: 1 TABLET ORAL at 07:52

## 2023-01-24 NOTE — NUR
NURSING PROGRESS NOTE: 



Problem : Per 5150 pt. is here for SA by OD on street drugs after command hallucinations 
telling him to do so. Pt. states that he took "Fentanyl, black china, meth, 8 ball" in 
suicide attempt. Pt. states he feels hopeless. Pt. is also delusional, stating, "they won't 
give me my billion dollars". Pt. states, "I tried killing myself because Im a free renetta. 
They dont love me but I love them. Yes I do."



Interventions :  One on one with patient to assess mood d/o, administered scheduled and PRN 
medication as ordered with no adverse side effects, established and maintained clear 
boundaries, oriented to reality, monitored behavior and provided intervention as needed,  
Q15 min safety rounds.



Response: Pt was walking in the hallway at change of shift, Pt is smiling and states he is 
in a good mood, but wants his meds early today because he has a dentist appt tomorrow. 
Assured patient he would still be able to attend his appt if his meds were given as 
scheduled. Pt asked to have laundry done so he has something to wear for his appointment and 
his laundry was done. Pt requested a snack and is c/o tooth pain requesting prn tylenol. Pt 
went to bed early anticipating his dental appt. Pt was given HS meds and PRN trazodone for 
sleep. Pt declined an evening snack. Pt does not want to take off his nicotine patch 
tonight. Pt has a dressing applied to his left great toe stating he "stubbed" his toe. 
Dressing is CDI. 





Plan:  Patient is LPS conserved and awaiting placement. Pt continues to be delusional or 
tangential. Pt continues to exhibit poor insight and judgment. Pt continues to require 
titration of medication in a safe and supportive environment.

## 2023-01-24 NOTE — NUR
DENTAL APPT 1/25/23 10:30 AM 



Mike will get picked up at 10:30 AM to go to his dental appt at [facility]. There 
is a white envelope with his name on it on the charge nurses clipboard that needs to go with 
him. It has his ID, [employer] card, and $ for a co-pay.



SIDDHARTHA Salamanca

## 2023-01-24 NOTE — NUR
NURSING PROGRESS NOTE: 



Problem : Per 5150 pt. is here for SA by OD on street drugs after command hallucinations 
telling him to do so. Pt. states that he took "Fentanyl, black china, meth, 8 ball" in 
suicide attempt. Pt. states he feels hopeless. Pt. is also delusional, stating, "they won't 
give me my billion dollars". Pt. states, "I tried killing myself because Im a free renetta. 
They dont love me but I love them. Yes I do."



Interventions :  One on one with patient to assess mood d/o, administered scheduled and PRN 
medication as ordered with no adverse side effects, established and maintained clear 
boundaries, oriented to reality, monitored behavior and provided intervention as needed,  
Q15 min safety rounds.



Response : Received patient sleeping at shift change. Pt woke prior to breakfast and 
requested his medications. Pt reports his mood Im happy, but feels a little tired. Pt 
states I slept good. Pts affect is calm and not constricted. Pt brightens with 
conversation. Speech is rapid, but pts behavior is not as maniac. Pt reported a BM this 
morning, but stool is hard. Administered MOM and received order for Colace 100 mg BID. 
Dietary is out of prune juice. Pt paces singh rapping or listening to head phones. Pt c/o toe 
pain stating I stubbed it last week.  On assessment noted mild red and yellow tinted 
drainage. Erythema around the base of the nail. Area was cleansed with NS and dressed. 
Received order from Dr. Dawkins for Septra DS 1 tab x 7 days. Pt remained calm throughout the 
shift, napping after lunch.  Nicotine lozenge x 2. 



Pt continues to c/o tooth pain, treated with PRN Anbesol, Tylenol.  No other behaviors as of 
this writing. Pt has a dentist appointment on 1/25/23. 



Plan:  Patient is LPS conserved and awaiting placement. Pt continues to be delusional or 
tangential. Pt continues to exhibit poor insight and judgment. Pt continues to require 
titration of medication in a safe and supportive environment.

## 2023-01-25 VITALS — SYSTOLIC BLOOD PRESSURE: 130 MMHG | DIASTOLIC BLOOD PRESSURE: 83 MMHG

## 2023-01-25 VITALS — DIASTOLIC BLOOD PRESSURE: 66 MMHG | SYSTOLIC BLOOD PRESSURE: 110 MMHG

## 2023-01-25 RX ADMIN — SULFAMETHOXAZOLE AND TRIMETHOPRIM SCH TAB: 800; 160 TABLET ORAL at 07:16

## 2023-01-25 RX ADMIN — BUPRENORPHINE AND NALOXONE SCH TAB: 2; .5 TABLET SUBLINGUAL at 07:15

## 2023-01-25 RX ADMIN — DOCUSATE SODIUM SCH MG: 100 CAPSULE, LIQUID FILLED ORAL at 19:09

## 2023-01-25 RX ADMIN — BUPRENORPHINE AND NALOXONE SCH TAB: 2; .5 TABLET SUBLINGUAL at 15:00

## 2023-01-25 RX ADMIN — DIVALPROEX SODIUM SCH MG: 250 TABLET, DELAYED RELEASE ORAL at 19:08

## 2023-01-25 RX ADMIN — NICOTINE POLACRILEX PRN MG: 2 LOZENGE ORAL at 07:40

## 2023-01-25 RX ADMIN — SULFAMETHOXAZOLE AND TRIMETHOPRIM SCH TAB: 800; 160 TABLET ORAL at 19:07

## 2023-01-25 RX ADMIN — DIVALPROEX SODIUM SCH MG: 250 TABLET, DELAYED RELEASE ORAL at 07:16

## 2023-01-25 RX ADMIN — BENZTROPINE MESYLATE SCH MG: 1 TABLET ORAL at 19:08

## 2023-01-25 RX ADMIN — ALUMINUM HYDROXIDE, MAGNESIUM HYDROXIDE, AND SIMETHICONE PRN ML: 200; 200; 20 SUSPENSION ORAL at 08:20

## 2023-01-25 RX ADMIN — Medication SCH PACKET: at 19:07

## 2023-01-25 RX ADMIN — Medication SCH MMU: at 07:16

## 2023-01-25 RX ADMIN — NICOTINE SCH PATCH: 14 PATCH, EXTENDED RELEASE TRANSDERMAL at 08:20

## 2023-01-25 RX ADMIN — DOCUSATE SODIUM SCH MG: 100 CAPSULE, LIQUID FILLED ORAL at 07:16

## 2023-01-25 RX ADMIN — THERA TABS SCH EACH: TAB at 07:16

## 2023-01-25 RX ADMIN — BENZTROPINE MESYLATE SCH MG: 1 TABLET ORAL at 07:15

## 2023-01-25 NOTE — NUR
F/u 1/25: Pt PO fluctuates though overall more consistent improving to  

~% avg meals w/ double eggs WB, smoothie TID, and snacks likely 

meeting needs. Dentist appointment for tooth ache today per EMR. LBM 

1/23. No further nutrition interventions at this time. Will continue to 

monitor.

Recommendations:

1) Continue Vegetarian diet per pt request; double eggs WB; smoothies

TIDWM; encourage PO

2) MVI supplementation per MD

3) Bowel care PRN

4) Weekly scaled weights

-------------------------------------------------------------------------------

Addendum: 01/25/23 at 0905 by Jordan Godfrey RD

-------------------------------------------------------------------------------

Amended: Links added.

## 2023-01-25 NOTE — NUR
NURSING PROGRESS NOTE: 



Problem : Per 5150 pt. is here for SA by OD on street drugs after command hallucinations 
telling him to do so. Pt. states that he took "Fentanyl, black china, meth, 8 ball" in 
suicide attempt. Pt. states he feels hopeless. Pt. is also delusional, stating, "they won't 
give me my billion dollars". Pt. states, "I tried killing myself because Im a free renetta. 
They dont love me but I love them. Yes I do."



Interventions :  One on one with patient to assess mood d/o, administered scheduled and PRN 
medication as ordered with no adverse side effects, established and maintained clear 
boundaries, oriented to reality, monitored behavior and provided intervention as needed,  
Q15 min safety rounds.



Response : RN received pt. asleep at start of shift. Pt. awoke shortly after and requesting 
a hot chocolate and his medications. Pt. took all medications an requesting Tylenol and 
ambesol for left tooth pain. Pt. received PRN analgesics with good effect. Pt. reports he is 
not looking forward to getting his tooth pulled and requested PRN. Pt. received Ativan 2mg 
po and Haldol 5mg with good effect. Pt left for Dentist appointment at 0920. Pt. returned 
from dentist appointment at 1700 and was very agitated. Pt. reports his conservator got him 
a coffee with 7 shots of espresso and that he drank 12 cups of coffee at the dentist office. 
Pt. began yelling that the [employer] were coming to get him and demanding to be let out. 
Pt. given verbal redirection not to yell but pt. continued to ramp up aggressive behavior, 
threatening this RN, stating, Im gonna beat the **** out of you and posturing in a 
threatening manor. Security was called and RN received one time dose of Haldol 5mg, Ativan 
2mg, and Benadryl 25mg IM. Pt. received medication with moderate effect. Pt. continues to 
perseverate on Lucifer, [employer], and other delusional content. 

 

Plan:  Patient is LPS conserved and awaiting placement. Pt continues to be delusional or 
tangential. Pt continues to exhibit poor insight and judgment. Pt continues to require 
titration of medication in a safe and supportive environment.

## 2023-01-26 VITALS — SYSTOLIC BLOOD PRESSURE: 100 MMHG | DIASTOLIC BLOOD PRESSURE: 50 MMHG

## 2023-01-26 RX ADMIN — SULFAMETHOXAZOLE AND TRIMETHOPRIM SCH TAB: 800; 160 TABLET ORAL at 21:03

## 2023-01-26 RX ADMIN — SULFAMETHOXAZOLE AND TRIMETHOPRIM SCH TAB: 800; 160 TABLET ORAL at 07:51

## 2023-01-26 RX ADMIN — Medication SCH MMU: at 07:51

## 2023-01-26 RX ADMIN — NICOTINE SCH PATCH: 14 PATCH, EXTENDED RELEASE TRANSDERMAL at 08:03

## 2023-01-26 RX ADMIN — DOCUSATE SODIUM SCH MG: 100 CAPSULE, LIQUID FILLED ORAL at 07:51

## 2023-01-26 RX ADMIN — DOCUSATE SODIUM SCH MG: 100 CAPSULE, LIQUID FILLED ORAL at 20:59

## 2023-01-26 RX ADMIN — BENZOCAINE PRN APPLIC: 200 SOLUTION TOPICAL at 12:19

## 2023-01-26 RX ADMIN — BENZTROPINE MESYLATE SCH MG: 1 TABLET ORAL at 21:00

## 2023-01-26 RX ADMIN — DIVALPROEX SODIUM SCH MG: 250 TABLET, DELAYED RELEASE ORAL at 07:51

## 2023-01-26 RX ADMIN — THERA TABS SCH EACH: TAB at 07:50

## 2023-01-26 RX ADMIN — BENZOCAINE PRN APPLIC: 200 SOLUTION TOPICAL at 08:20

## 2023-01-26 RX ADMIN — BUPRENORPHINE AND NALOXONE SCH TAB: 2; .5 TABLET SUBLINGUAL at 15:00

## 2023-01-26 RX ADMIN — Medication SCH PACKET: at 21:05

## 2023-01-26 RX ADMIN — DIVALPROEX SODIUM SCH MG: 250 TABLET, DELAYED RELEASE ORAL at 21:00

## 2023-01-26 RX ADMIN — BUPRENORPHINE AND NALOXONE SCH TAB: 2; .5 TABLET SUBLINGUAL at 07:52

## 2023-01-26 RX ADMIN — BENZTROPINE MESYLATE SCH MG: 1 TABLET ORAL at 07:51

## 2023-01-26 NOTE — NUR
NURSING PROGRESS NOTE: 



Problem : Per 5150 pt. is here for SA by OD on street drugs after command hallucinations 
telling him to do so. Pt. states that he took "Fentanyl, black china, meth, 8 ball" in 
suicide attempt. Pt. states he feels hopeless. Pt. is also delusional, stating, "they won't 
give me my billion dollars". Pt. states, "I tried killing myself because Im a free renetta. 
They dont love me but I love them. Yes I do."



Interventions :  One on one with patient to assess mood d/o, administered scheduled and PRN 
medication as ordered with no adverse side effects, established and maintained clear 
boundaries, oriented to reality, monitored behavior and provided intervention as needed,  
Q15 min safety rounds.



Response : Patient was found standing in [address] talking very loudly about his dentist ap 
at change of shift. Patient continued to go on about all the caffine he had and all the 
drugs he was give. Patient initially refused night medications stating they were giving him 
bad dreams of his family being killed and him being taken away by demons. Patient stated the 
the staff was trying to cause him to overdose and that he would take another round of shits 
but no medications. Eventually Patient agreed to take night medications if security would 
come up on t the floor. Security came and patient still required some coking to take night 
medication but eventually did. 45 Minutes later Patient still continues to yell and talk 
about delusions. Dr raines ordered prazosin 5mg to help with night humphrey. Patient was also 
given prn lorazepam 2mg and prn trazodone 100mg to help him sleep. 1 hour later patient got 
up still upset talking about dentist appointment and complaining of tooth pain. Patient was 
given repeat trazodone 100mg along with Tylenol 650mg. Patient was finally observed 
sleeping. 



Plan:  Patient is LPS conserved and awaiting placement. Pt continues to be delusional or 
tangential. Pt continues to exhibit poor insight and judgment. Pt continues to require 
titration of medication in a safe and supportive environment.
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
## 2023-01-27 VITALS — DIASTOLIC BLOOD PRESSURE: 58 MMHG | SYSTOLIC BLOOD PRESSURE: 112 MMHG

## 2023-01-27 VITALS — SYSTOLIC BLOOD PRESSURE: 100 MMHG | DIASTOLIC BLOOD PRESSURE: 42 MMHG

## 2023-01-27 RX ADMIN — THERA TABS SCH EACH: TAB at 07:45

## 2023-01-27 RX ADMIN — DOCUSATE SODIUM SCH MG: 100 CAPSULE, LIQUID FILLED ORAL at 07:45

## 2023-01-27 RX ADMIN — BUPRENORPHINE AND NALOXONE SCH TAB: 2; .5 TABLET SUBLINGUAL at 07:45

## 2023-01-27 RX ADMIN — SULFAMETHOXAZOLE AND TRIMETHOPRIM SCH TAB: 800; 160 TABLET ORAL at 20:35

## 2023-01-27 RX ADMIN — SULFAMETHOXAZOLE AND TRIMETHOPRIM SCH TAB: 800; 160 TABLET ORAL at 07:44

## 2023-01-27 RX ADMIN — DIVALPROEX SODIUM SCH MG: 250 TABLET, DELAYED RELEASE ORAL at 20:35

## 2023-01-27 RX ADMIN — Medication SCH MMU: at 07:43

## 2023-01-27 RX ADMIN — Medication SCH PACKET: at 20:36

## 2023-01-27 RX ADMIN — DOCUSATE SODIUM SCH MG: 100 CAPSULE, LIQUID FILLED ORAL at 20:35

## 2023-01-27 RX ADMIN — BUPRENORPHINE AND NALOXONE SCH TAB: 2; .5 TABLET SUBLINGUAL at 16:35

## 2023-01-27 RX ADMIN — NICOTINE POLACRILEX PRN MG: 2 LOZENGE ORAL at 16:35

## 2023-01-27 RX ADMIN — DIVALPROEX SODIUM SCH MG: 250 TABLET, DELAYED RELEASE ORAL at 07:44

## 2023-01-27 RX ADMIN — NICOTINE SCH PATCH: 14 PATCH, EXTENDED RELEASE TRANSDERMAL at 07:43

## 2023-01-27 RX ADMIN — BENZTROPINE MESYLATE SCH MG: 1 TABLET ORAL at 20:36

## 2023-01-27 RX ADMIN — BENZTROPINE MESYLATE SCH MG: 1 TABLET ORAL at 07:44

## 2023-01-27 NOTE — NUR
Mike's mother sent him a b-day package with a hat, shirt, letter with $20 bill and 
several checks ($800 total). Called his Public Guardian, Linda, and informed her of the 
checks and asked for a return call.



SIDDHARTHA Salamanca

## 2023-01-27 NOTE — NUR
BEHAVIORAL PLAN



Mike can earn rewards for good behavior. Mike's choice of rewards are snack size 
snickers and peanut butter cups, chocolate milk, and caffeine FREE coke. ( will 
provide a stash of rewards on Monday).



Good behavior includes: following the rules, taking his medications, speaking nicely to 
others, attending groups, and cleaning the tables after meals (or some other chore of his 
liking). 



Any threatening or aggressive behavioral outbursts should not be rewarded.



It is highly important for staff to be consistent with Mike.



SIDDHARTHA Salamanca

## 2023-01-27 NOTE — NUR
Nursing Progress Note:      



Problem: Per 5150 pt. is here for SA by OD on street drugs after command hallucinations 
telling him to do so. Pt. states that he took "Fentanyl, black china, meth, 8 ball" in 
suicide attempt. Pt. states he feels hopeless. Pt. is also delusional, stating, "they won't 
give me my billion dollars". Pt. states, "I tried killing myself because Im a free renetta. 
They dont love me but I love them. Yes I do."



Interventions: 1:1 assessment, therapeutic conversation, active listening, ensured contract 
for safety, medication administration/education/monitoring, behavior monitoring and 
intervention as needed; reality orientation, distraction, redirection, limit setting, 
elopement prevention, show of support, gave emergent IM meds, seclusion, positive 
reinforcement, and Q15 minute safety checks.



Response:  Patient woke up this morning feeling sorry for himself, per his usual after he 
creates chaos like yesterday. He apologized to people, also per his usual. It is not 
unreasonable for him to feel the way he does. This place is worse than senior living. At least if 
youre in senior living, you know when you will get out. They get to spend some time outside every 
day. Not here, if were [facility] once a week, but thats rare. His frustration is palpable, 
and he sees no end to this senior living sentence. Patients day started rough, and there was 
potential escalation, but de-escalation was effective. He has been pretty calm since this 
morning. Patient does need his nurse to sit and just listen to him so he can talk out his 
frustrations. He has been compliant with all medications today.



Plan: Pt is conserved and awaiting placement.

## 2023-01-28 VITALS — DIASTOLIC BLOOD PRESSURE: 60 MMHG | SYSTOLIC BLOOD PRESSURE: 120 MMHG

## 2023-01-28 VITALS — DIASTOLIC BLOOD PRESSURE: 81 MMHG | SYSTOLIC BLOOD PRESSURE: 137 MMHG

## 2023-01-28 RX ADMIN — BUPRENORPHINE AND NALOXONE SCH TAB: 2; .5 TABLET SUBLINGUAL at 07:56

## 2023-01-28 RX ADMIN — NICOTINE SCH PATCH: 14 PATCH, EXTENDED RELEASE TRANSDERMAL at 07:48

## 2023-01-28 RX ADMIN — DIVALPROEX SODIUM SCH MG: 250 TABLET, DELAYED RELEASE ORAL at 19:45

## 2023-01-28 RX ADMIN — Medication SCH PACKET: at 19:47

## 2023-01-28 RX ADMIN — BENZTROPINE MESYLATE SCH MG: 1 TABLET ORAL at 07:50

## 2023-01-28 RX ADMIN — BENZOCAINE PRN APPLIC: 200 SOLUTION TOPICAL at 15:25

## 2023-01-28 RX ADMIN — Medication SCH MMU: at 07:50

## 2023-01-28 RX ADMIN — THERA TABS SCH EACH: TAB at 07:56

## 2023-01-28 RX ADMIN — DOCUSATE SODIUM SCH MG: 100 CAPSULE, LIQUID FILLED ORAL at 07:51

## 2023-01-28 RX ADMIN — BENZOCAINE PRN APPLIC: 200 SOLUTION TOPICAL at 07:30

## 2023-01-28 RX ADMIN — DIVALPROEX SODIUM SCH MG: 250 TABLET, DELAYED RELEASE ORAL at 07:49

## 2023-01-28 RX ADMIN — SULFAMETHOXAZOLE AND TRIMETHOPRIM SCH TAB: 800; 160 TABLET ORAL at 07:49

## 2023-01-28 RX ADMIN — DOCUSATE SODIUM SCH MG: 100 CAPSULE, LIQUID FILLED ORAL at 19:46

## 2023-01-28 RX ADMIN — SULFAMETHOXAZOLE AND TRIMETHOPRIM SCH TAB: 800; 160 TABLET ORAL at 19:46

## 2023-01-28 RX ADMIN — BUPRENORPHINE AND NALOXONE SCH TAB: 2; .5 TABLET SUBLINGUAL at 14:49

## 2023-01-28 RX ADMIN — BENZTROPINE MESYLATE SCH MG: 1 TABLET ORAL at 19:47

## 2023-01-28 RX ADMIN — NICOTINE POLACRILEX PRN MG: 2 LOZENGE ORAL at 07:30

## 2023-01-28 NOTE — NUR
Nursing Progress Note:      



Problem: Per 5150 pt. is here for SA by OD on street drugs after command hallucinations 
telling him to do so. Pt. states that he took "Fentanyl, black china, meth, 8 ball" in 
suicide attempt. Pt. states he feels hopeless. Pt. is also delusional, stating, "they won't 
give me my billion dollars". Pt. states, "I tried killing myself because Im a free renetta. 
They dont love me but I love them. Yes I do."



Interventions: 1:1 assessment, therapeutic conversation, active listening, ensured contract 
for safety, medication administration/education/monitoring, behavior monitoring and 
intervention as needed; reality orientation, distraction, redirection, limit setting, 
elopement prevention, show of support, gave emergent IM meds, seclusion, positive 
reinforcement, and Q15 minute safety checks.



Response:  Patient awakened at 0700 complaining about his teeth pain.  Tylenol, Naprosyn, 
Suboxone,  and Anbesol, with continued pain.  Patient states that he is depressed and 
anxious.  Prn Ativan 2 mg given with good effect.  Patient has been cooperative on the unit 
today and medication compliant.  Patient comes to nurse whenever he feels himself escalate 
and take medications.  



Plan: Pt is conserved and awaiting placement.

## 2023-01-28 NOTE — NUR
RN PROGRESS NOTE:



LEGAL HOLD:  LPS for GD



PROBLEM:  Per 5150 pt. is here for SA by OD on street drugs after command hallucinations 
telling him to do so. Pt. states that he took "Fentanyl, black china, meth, 8 ball" in 
suicide attempt. Pt. states he feels hopeless. Pt. is also delusional, stating, "they won't 
give me my billion dollars". Pt. states, "I tried killing myself because Im a free renetta. 
They dont love me but I love them. Yes I do."



INTERVENTIONS: 1:1 assessment, ensured contract for safety, medication 
administration/education/monitoring, behavior monitoring and intervention as needed; reality 
orientation, elopement prevention, show of support, positive reinforcement, and Q15 minute 
safety checks.



RESPONSE:  In bed at COS, remained in bed during shift.  Awake for PM meds.  Medication 
compliant.  Client avoids eye contact, has a flat affect, and depressed mood.  Guarded and 
withdrawn.

## 2023-01-29 VITALS — SYSTOLIC BLOOD PRESSURE: 117 MMHG | DIASTOLIC BLOOD PRESSURE: 61 MMHG

## 2023-01-29 VITALS — DIASTOLIC BLOOD PRESSURE: 63 MMHG | SYSTOLIC BLOOD PRESSURE: 93 MMHG

## 2023-01-29 RX ADMIN — Medication SCH PACKET: at 20:03

## 2023-01-29 RX ADMIN — Medication SCH MMU: at 07:20

## 2023-01-29 RX ADMIN — BENZTROPINE MESYLATE SCH MG: 1 TABLET ORAL at 07:21

## 2023-01-29 RX ADMIN — BENZOCAINE PRN APPLIC: 200 SOLUTION TOPICAL at 08:50

## 2023-01-29 RX ADMIN — SULFAMETHOXAZOLE AND TRIMETHOPRIM SCH TAB: 800; 160 TABLET ORAL at 20:01

## 2023-01-29 RX ADMIN — DOCUSATE SODIUM SCH MG: 100 CAPSULE, LIQUID FILLED ORAL at 07:21

## 2023-01-29 RX ADMIN — BUPRENORPHINE AND NALOXONE SCH TAB: 2; .5 TABLET SUBLINGUAL at 15:10

## 2023-01-29 RX ADMIN — BUPRENORPHINE AND NALOXONE SCH TAB: 2; .5 TABLET SUBLINGUAL at 07:20

## 2023-01-29 RX ADMIN — DIVALPROEX SODIUM SCH MG: 250 TABLET, DELAYED RELEASE ORAL at 07:21

## 2023-01-29 RX ADMIN — SULFAMETHOXAZOLE AND TRIMETHOPRIM SCH TAB: 800; 160 TABLET ORAL at 07:20

## 2023-01-29 RX ADMIN — DOCUSATE SODIUM SCH MG: 100 CAPSULE, LIQUID FILLED ORAL at 20:02

## 2023-01-29 RX ADMIN — THERA TABS SCH EACH: TAB at 07:21

## 2023-01-29 RX ADMIN — DIVALPROEX SODIUM SCH MG: 250 TABLET, DELAYED RELEASE ORAL at 20:01

## 2023-01-29 RX ADMIN — NICOTINE POLACRILEX PRN MG: 2 LOZENGE ORAL at 08:50

## 2023-01-29 RX ADMIN — BENZTROPINE MESYLATE SCH MG: 1 TABLET ORAL at 20:01

## 2023-01-29 RX ADMIN — NICOTINE SCH PATCH: 14 PATCH, EXTENDED RELEASE TRANSDERMAL at 07:20

## 2023-01-29 RX ADMIN — BENZOCAINE PRN APPLIC: 200 SOLUTION TOPICAL at 18:56

## 2023-01-29 NOTE — NUR
Nursing Progress Note:      



Problem: Per 5150 pt. is here for SA by OD on street drugs after command hallucinations 
telling him to do so. Pt. states that he took "Fentanyl, black china, meth, 8 ball" in 
suicide attempt. Pt. states he feels hopeless. Pt. is also delusional, stating, "they won't 
give me my billion dollars". Pt. states, "I tried killing myself because Im a free renetta. 
They dont love me but I love them. Yes I do."



Interventions: 1:1 assessment, therapeutic conversation, active listening, ensured contract 
for safety, medication administration/education/monitoring, behavior monitoring and 
intervention as needed; reality orientation,Q15 minute safety checks.



Response:  Patient in bed sleeping at shift change. Patient self isolated and gave minimal 
responses to questions for 1:1. Patient reports that he had an alright day. Patient went 
back to sleep after minimal assessment. Patient took evening meds w/o complications. Patient 
went back to sleep after med pass. 



Plan: Pt is conserved and awaiting placement.

## 2023-01-29 NOTE — NUR
Nursing Progress Note:      



Problem: Per 5150 pt. is here for SA by OD on street drugs after command hallucinations 
telling him to do so. Pt. states that he took "Fentanyl, black china, meth, 8 ball" in 
suicide attempt. Pt. states he feels hopeless. Pt. is also delusional, stating, "they won't 
give me my billion dollars". Pt. states, "I tried killing myself because Im a free renetta. 
They dont love me but I love them. Yes I do."



Interventions: 1:1 assessment, therapeutic conversation, active listening, ensured contract 
for safety, medication administration/education/monitoring, behavior monitoring and 
intervention as needed; reality orientation,Q15 minute safety checks.



Response:Patient in room laying on bed at shift change.Patient refused dinner tray.  Patient 
appeared not to be feeling well to which the patient replied that his tooth hurt. The 
patient was given PRN Anbesol, Tylenol 650mg. Patient cheek is slightly swollen with redness 
on inner cheek near tooth. Patient isolated to room for entire shift. Patient ate a peanut 
butter and jelly sandwich.  Patient took evening meds w/o complications and went to sleep 
after med pass.

 

Plan: Pt is conserved and awaiting placement.

## 2023-01-29 NOTE — NUR
Nursing Progress Note:      



Problem: Per 5150 pt. is here for SA by OD on street drugs after command hallucinations 
telling him to do so. Pt. states that he took "Fentanyl, black china, meth, 8 ball" in 
suicide attempt. Pt. states he feels hopeless. Pt. is also delusional, stating, "they won't 
give me my billion dollars". Pt. states, "I tried killing myself because Im a free renetta. 
They dont love me but I love them. Yes I do."



Interventions: 1:1 assessment, therapeutic conversation, active listening, ensured contract 
for safety, medication administration/education/monitoring, behavior monitoring and 
intervention as needed; reality orientation, distraction, redirection, limit setting, 
elopement prevention, show of support, positive reinforcement, and Q15 minute safety checks.



Response:  Patient awakened at approximately 07:00 and requested his medications.  
Medications were taken as directed.  Patient complaining of teeth pain, patient was given 
Naprosyn, Tylenol, Suboxone, and Anbesol.  Patient reports that he is in a good mood today.  
Later in the morning patient had GI upset and was given Maalox. Patient reports that he 
threw up his lunch today.  Patient stayed in his room for the afternoon because he stated he 
was not feeling well.



Plan: Pt is conserved and awaiting placement.

## 2023-01-30 VITALS — SYSTOLIC BLOOD PRESSURE: 90 MMHG | DIASTOLIC BLOOD PRESSURE: 50 MMHG

## 2023-01-30 VITALS — SYSTOLIC BLOOD PRESSURE: 119 MMHG | DIASTOLIC BLOOD PRESSURE: 78 MMHG

## 2023-01-30 VITALS — SYSTOLIC BLOOD PRESSURE: 108 MMHG | DIASTOLIC BLOOD PRESSURE: 64 MMHG

## 2023-01-30 RX ADMIN — BENZOCAINE PRN APPLIC: 200 SOLUTION TOPICAL at 12:57

## 2023-01-30 RX ADMIN — BUPRENORPHINE AND NALOXONE SCH TAB: 2; .5 TABLET SUBLINGUAL at 08:34

## 2023-01-30 RX ADMIN — BENZTROPINE MESYLATE SCH MG: 1 TABLET ORAL at 21:17

## 2023-01-30 RX ADMIN — NICOTINE POLACRILEX PRN MG: 2 LOZENGE ORAL at 11:19

## 2023-01-30 RX ADMIN — BENZOCAINE PRN APPLIC: 200 SOLUTION TOPICAL at 16:07

## 2023-01-30 RX ADMIN — NICOTINE SCH PATCH: 14 PATCH, EXTENDED RELEASE TRANSDERMAL at 08:32

## 2023-01-30 RX ADMIN — SULFAMETHOXAZOLE AND TRIMETHOPRIM SCH TAB: 800; 160 TABLET ORAL at 21:18

## 2023-01-30 RX ADMIN — THERA TABS SCH EACH: TAB at 08:33

## 2023-01-30 RX ADMIN — DOCUSATE SODIUM SCH MG: 100 CAPSULE, LIQUID FILLED ORAL at 08:32

## 2023-01-30 RX ADMIN — BENZOCAINE PRN APPLIC: 200 SOLUTION TOPICAL at 09:14

## 2023-01-30 RX ADMIN — SULFAMETHOXAZOLE AND TRIMETHOPRIM SCH TAB: 800; 160 TABLET ORAL at 08:33

## 2023-01-30 RX ADMIN — Medication SCH PACKET: at 21:17

## 2023-01-30 RX ADMIN — DIVALPROEX SODIUM SCH MG: 250 TABLET, DELAYED RELEASE ORAL at 21:17

## 2023-01-30 RX ADMIN — BUPRENORPHINE AND NALOXONE SCH TAB: 2; .5 TABLET SUBLINGUAL at 15:00

## 2023-01-30 RX ADMIN — BENZTROPINE MESYLATE SCH MG: 1 TABLET ORAL at 08:33

## 2023-01-30 RX ADMIN — Medication SCH MMU: at 08:33

## 2023-01-30 RX ADMIN — DIVALPROEX SODIUM SCH MG: 250 TABLET, DELAYED RELEASE ORAL at 08:32

## 2023-01-30 RX ADMIN — DOCUSATE SODIUM SCH MG: 100 CAPSULE, LIQUID FILLED ORAL at 21:17

## 2023-01-30 NOTE — NUR
Nursing Progress Note      



Problem: Per 5150 pt. is here for SA by OD on street drugs after command hallucinations 
telling him to do so. Pt. states that he took "Fentanyl, black china, meth, 8 ball" in 
suicide attempt. Pt. states he feels hopeless. Pt. is also delusional, stating, "They won't 
give me my billion dollars". Pt. states, "I tried killing myself because Im a free renetta. 
They dont love me but I love them. Yes I do."



Interventions: Provide medication administration & medication management; Maintained a safe 
& supportive environment; Clear & simple instructions; Direction & encouragement  regarding 
performance of ADLs; monitored behaviors & maintained clear boundaries; Patient physical 
assessment & 1:1 patient interview; Therapeutic conversation & active listening; Patient 
education & monitoring.

 

Response:  Received patient this morning at 0750 who initially refused breakfast then 
received Anbesol and allowed it to work then ate his breakfast.  Patient is pleasant and 
ambulating throughout the Unit and took his medications at 0830.  Patient requested Juice 
and he was given juice and then kept ambulating.  After receiving his medications, patient 
informed this Writer I feel a whole lot of anxiety right now.  Patient given Ativan 2mg po 
at 0910 and he went to lay down in his room.  Patient slept until 1100 and participated in 
snack time.  Patient ate his lunch in the Community Room and then ambulated in the hallways. 
 Patient came to this Writer at approximately 1300 and reported I feel my heart beating 
hard inside my chest.  Vital signs were taken and patient his CZ=483. LC=415/78 and P.O. 
was 99%.  Looking back at patients vital signs over the past few days, patients heart rate 
was 68-72.  Spoke with ZULEMA Renee, and received a one-time order for Propranolol 20 mg po 
now.  Patient was administered the Propranolol at 1304 and laid down on his bed.  Patient 
got up at 1500 and participated in snack time.  Patients heart rate was 72 when he got back 
up out of bed at 1500.  Patient took his scheduled medications the rest of the day and 
received Anbesol for his tooth pain at 1606.  



Plan: Pt is conserved and awaiting placement.

## 2023-01-31 VITALS — SYSTOLIC BLOOD PRESSURE: 94 MMHG | DIASTOLIC BLOOD PRESSURE: 51 MMHG

## 2023-01-31 VITALS — SYSTOLIC BLOOD PRESSURE: 122 MMHG | DIASTOLIC BLOOD PRESSURE: 72 MMHG

## 2023-01-31 RX ADMIN — DOCUSATE SODIUM SCH MG: 100 CAPSULE, LIQUID FILLED ORAL at 20:37

## 2023-01-31 RX ADMIN — BENZTROPINE MESYLATE SCH MG: 1 TABLET ORAL at 07:37

## 2023-01-31 RX ADMIN — BUPRENORPHINE AND NALOXONE SCH TAB: 2; .5 TABLET SUBLINGUAL at 07:38

## 2023-01-31 RX ADMIN — DOCUSATE SODIUM SCH MG: 100 CAPSULE, LIQUID FILLED ORAL at 07:38

## 2023-01-31 RX ADMIN — BUPRENORPHINE AND NALOXONE SCH TAB: 2; .5 TABLET SUBLINGUAL at 15:14

## 2023-01-31 RX ADMIN — NICOTINE SCH PATCH: 14 PATCH, EXTENDED RELEASE TRANSDERMAL at 07:38

## 2023-01-31 RX ADMIN — DIVALPROEX SODIUM SCH MG: 250 TABLET, DELAYED RELEASE ORAL at 07:37

## 2023-01-31 RX ADMIN — Medication SCH MMU: at 07:37

## 2023-01-31 RX ADMIN — DIVALPROEX SODIUM SCH MG: 250 TABLET, DELAYED RELEASE ORAL at 20:37

## 2023-01-31 RX ADMIN — BENZOCAINE PRN APPLIC: 200 SOLUTION TOPICAL at 07:45

## 2023-01-31 RX ADMIN — Medication SCH PACKET: at 20:36

## 2023-01-31 RX ADMIN — BENZOCAINE PRN APPLIC: 200 SOLUTION TOPICAL at 15:52

## 2023-01-31 RX ADMIN — THERA TABS SCH EACH: TAB at 07:38

## 2023-01-31 RX ADMIN — BENZTROPINE MESYLATE SCH MG: 1 TABLET ORAL at 20:37

## 2023-01-31 NOTE — NUR
Nursing Progress Note      



Problem: Per 5150 pt. is here for SA by OD on street drugs after command hallucinations 
telling him to do so. Pt. states that he took "Fentanyl, black china, meth, 8 ball" in 
suicide attempt. Pt. states he feels hopeless. Pt. is also delusional, stating, "They won't 
give me my billion dollars". Pt. states, "I tried killing myself because Im a free renetta. 
They dont love me but I love them. Yes I do."



Interventions: Provide medication administration & medication management; Maintained a safe 
& supportive environment; Clear & simple instructions; Direction & encouragement  regarding 
performance of ADLs; monitored behaviors & maintained clear boundaries; Patient physical 
assessment & 1:1 patient interview; Therapeutic conversation & active listening; Patient 
education & monitoring.

 

Response:  Received patient who was awake at 0715 and states I slept well last night.  
Patient denies SI/AV/VH at this time.  Patient appears linear in conversation and has a calm 
demeanor.  Patient ate breakfast in the Community Room and then ambulated around the unit.  
Patient is experiencing upper right tooth pain and lower right tooth pain.  Anbesol was used 
with a cotton applicator on the problem teeth and provided him some relief.  Patient also 
received Tylenol at 1035 for pain rated at a 5 on a scale of 1-10, and received relief.  
Patient laid down for approximately 1 hour this morning and again this afternoon from 1315 
to 1515.  Patient was resting comfortably.  Patient took his medications without hesitation. 
 



Plan: Pt is conserved and awaiting placement.

## 2023-01-31 NOTE — NUR
1:1 with client

ENIO Brennan called, stating client wished to speak to this writer. This writer met with client. 
Client's  is ARCADIO CARL. Client asked if this writer has spoken to his 
conservator, Amy, in [facility]. This writer confirmed to client his  is 
Silvana, Silvana is the single point of contact for client, and Silvana has been reaching out 
consistently to [facility] as part of client's TCON and placement processes. This 
writer normalized client's feelings. Client said thank you for this information. Client 
looked, to this writer, like he is not feeling well physically (pallor, body language), and 
this writer report this to ENIO Brennan.

## 2023-01-31 NOTE — NUR
Nursing Progress Note      



Problem: Per 5150 pt. is here for SA by OD on street drugs after command hallucinations 
telling him to do so. Pt. states that he took "Fentanyl, black china, meth, 8 ball" in 
suicide attempt. Pt. states he feels hopeless. Pt. is also delusional, stating, "They won't 
give me my billion dollars". Pt. states, "I tried killing myself because Im a free renetta. 
They dont love me but I love them. Yes I do."



Interventions: Provide medication administration & medication management; Maintained a safe 
& supportive environment; Clear & simple instructions; Direction & encouragement  regarding 
performance of ADLs; monitored behaviors & maintained clear boundaries; Patient physical 
assessment & 1:1 patient interview; Therapeutic conversation & active listening; Patient 
education & monitoring.

 

Response: Patient appears depressed and fatigued. He's pleasant and cooperative with care; 
compliant with medication. PRN Tylenol provided and patient claimed to have removed Nicotine 
patch. Patient remained in bed throughout the shift; refused HS snack. Observed sleeping 
with no apparent difficulties.  



Plan: Pt is conserved and awaiting placement.

## 2023-02-01 VITALS — DIASTOLIC BLOOD PRESSURE: 42 MMHG | SYSTOLIC BLOOD PRESSURE: 84 MMHG

## 2023-02-01 VITALS — SYSTOLIC BLOOD PRESSURE: 98 MMHG | DIASTOLIC BLOOD PRESSURE: 50 MMHG

## 2023-02-01 RX ADMIN — DOCUSATE SODIUM SCH MG: 100 CAPSULE, LIQUID FILLED ORAL at 08:52

## 2023-02-01 RX ADMIN — DIVALPROEX SODIUM SCH MG: 250 TABLET, DELAYED RELEASE ORAL at 08:54

## 2023-02-01 RX ADMIN — BENZTROPINE MESYLATE SCH MG: 1 TABLET ORAL at 08:52

## 2023-02-01 RX ADMIN — NICOTINE POLACRILEX PRN MG: 2 LOZENGE ORAL at 09:34

## 2023-02-01 RX ADMIN — BUPRENORPHINE AND NALOXONE SCH TAB: 2; .5 TABLET SUBLINGUAL at 14:56

## 2023-02-01 RX ADMIN — Medication SCH MMU: at 08:54

## 2023-02-01 RX ADMIN — DOCUSATE SODIUM SCH MG: 100 CAPSULE, LIQUID FILLED ORAL at 20:44

## 2023-02-01 RX ADMIN — DIVALPROEX SODIUM SCH MG: 250 TABLET, DELAYED RELEASE ORAL at 20:43

## 2023-02-01 RX ADMIN — NICOTINE SCH PATCH: 14 PATCH, EXTENDED RELEASE TRANSDERMAL at 08:52

## 2023-02-01 RX ADMIN — THERA TABS SCH EACH: TAB at 08:55

## 2023-02-01 RX ADMIN — BENZOCAINE PRN APPLIC: 200 SOLUTION TOPICAL at 13:45

## 2023-02-01 RX ADMIN — BUPRENORPHINE AND NALOXONE SCH TAB: 2; .5 TABLET SUBLINGUAL at 08:54

## 2023-02-01 RX ADMIN — Medication SCH PACKET: at 20:43

## 2023-02-01 RX ADMIN — BENZTROPINE MESYLATE SCH MG: 1 TABLET ORAL at 20:44

## 2023-02-01 NOTE — NUR
Nursing Progress Note      



Problem: Per 5150 pt. is here for SA by OD on street drugs after command hallucinations 
telling him to do so. Pt. states that he took "Fentanyl, black china, meth, 8 ball" in 
suicide attempt. Pt. states he feels hopeless. Pt. is also delusional, stating, "They won't 
give me my billion dollars". Pt. states, "I tried killing myself because Im a free renetta. 
They dont love me but I love them. Yes I do."



Interventions: Provide medication administration & medication management; Maintained a safe 
& supportive environment; Clear & simple instructions; Direction & encouragement  regarding 
performance of ADLs; monitored behaviors & maintained clear boundaries; Patient physical 
assessment & 1:1 patient interview; Therapeutic conversation & active listening; Patient 
education & monitoring.

 

Response:  Patient sleeping at change of shift.  Patient awakens late, and his breakfast 
tray was already sent back.  Patient is medication compliant.  Patient in the morning 
reports that he is happy and walks the hallways with a peer and listens to headphones.  At 
midday, patient became tearful stating that he was going to hurt when his mom passes away, 
attempted to cheer patient up.  Patient complains of 10/10 teeth pain, Anbesol, Tylenol, 
Suboxone, Naprosyn given for pain, with varying degrees of relief. Patient is calm and 
cooperative with treatment.



Plan: Pt is conserved and awaiting placement.

## 2023-02-01 NOTE — NUR
Reassessment: PO intake fluctuates however overall appears to have 

declined since last RD assessment (1/25). Pt documented with average 64% 

PO intake of 14 meals since 1/26 though given refusal of 5 meals average PO

intake is down to 47% not meeting estimated nutrient needs. Noted some 

discrepancy as pt documented with 0-25% of some meals that RN 

documentation states pt ate. Per RN documentation patient's acceptance to 

snacks fluctuates. Noted pt with c/o tooth pain and feeling ill, likely 

impacting appetite and PO intake. Pt is receiving double eggs and 

smoothies with meals. IF PO intake does not improve pt would benefit from 

ONS to assist with meeting estimated nutrient needs. LBM 1/31. Will continue to follow.

Recommendations:

1) Continue Vegetarian diet per pt request; double eggs WB; smoothies

TIDWM; encourage PO intake; monitor need for ONS

2) MVI supplementation per MD

3) Bowel care PRN

4) Weekly scaled weights

-------------------------------------------------------------------------------

Addendum: 02/01/23 at 1439 by Venita Choudhary RD

-------------------------------------------------------------------------------

Amended: Links added.

## 2023-02-01 NOTE — NUR
Nursing Progress Note      



Problem: Per 5150 pt. is here for SA by OD on street drugs after command hallucinations 
telling him to do so. Pt. states that he took "Fentanyl, black china, meth, 8 ball" in 
suicide attempt. Pt. states he feels hopeless. Pt. is also delusional, stating, "They won't 
give me my billion dollars". Pt. states, "I tried killing myself because Im a free renetta. 
They dont love me but I love them. Yes I do."



Interventions: Provide medication administration & medication management; Maintained a safe 
& supportive environment; Clear & simple instructions; Direction & encouragement  regarding 
performance of ADLs; monitored behaviors & maintained clear boundaries; Patient physical 
assessment & 1:1 patient interview; Therapeutic conversation & active listening; Patient 
education & monitoring.

 

Response: Patient remains self isolative and appears fatigued; cooperative with care and 
compliant with medication. No SI, HI, A/VH reported and no apparent delusions expressed. 
Patient did not leave his room this shift and denied HS snack; observed sleeping and does 
not appear to be having difficulty.   



Patient reported that he removed Nicotine patch and threw it away prior to HS med pass. 



Plan: Pt is conserved and awaiting placement.

## 2023-02-02 VITALS — DIASTOLIC BLOOD PRESSURE: 56 MMHG | SYSTOLIC BLOOD PRESSURE: 98 MMHG

## 2023-02-02 VITALS — SYSTOLIC BLOOD PRESSURE: 96 MMHG | DIASTOLIC BLOOD PRESSURE: 44 MMHG

## 2023-02-02 RX ADMIN — BENZTROPINE MESYLATE SCH MG: 1 TABLET ORAL at 21:47

## 2023-02-02 RX ADMIN — Medication SCH MMU: at 07:37

## 2023-02-02 RX ADMIN — Medication SCH PACKET: at 21:45

## 2023-02-02 RX ADMIN — DOCUSATE SODIUM SCH MG: 100 CAPSULE, LIQUID FILLED ORAL at 21:46

## 2023-02-02 RX ADMIN — BENZTROPINE MESYLATE SCH MG: 1 TABLET ORAL at 07:36

## 2023-02-02 RX ADMIN — NICOTINE SCH PATCH: 14 PATCH, EXTENDED RELEASE TRANSDERMAL at 07:35

## 2023-02-02 RX ADMIN — DIVALPROEX SODIUM SCH MG: 250 TABLET, DELAYED RELEASE ORAL at 21:47

## 2023-02-02 RX ADMIN — DIVALPROEX SODIUM SCH MG: 250 TABLET, DELAYED RELEASE ORAL at 07:35

## 2023-02-02 RX ADMIN — THERA TABS SCH EACH: TAB at 07:37

## 2023-02-02 RX ADMIN — DOCUSATE SODIUM SCH MG: 100 CAPSULE, LIQUID FILLED ORAL at 07:38

## 2023-02-02 RX ADMIN — BUPRENORPHINE AND NALOXONE SCH TAB: 2; .5 TABLET SUBLINGUAL at 07:38

## 2023-02-02 RX ADMIN — BUPRENORPHINE AND NALOXONE SCH TAB: 2; .5 TABLET SUBLINGUAL at 15:02

## 2023-02-02 NOTE — NUR
Nursing Progress Note      



Problem: Per 5150 pt. is here for SA by OD on street drugs after command hallucinations 
telling him to do so. Pt. states that he took "Fentanyl, black china, meth, 8 ball" in 
suicide attempt. Pt. states he feels hopeless. Pt. is also delusional, stating, "They won't 
give me my billion dollars". Pt. states, "I tried killing myself because Im a free renetta. 
They dont love me but I love them. Yes I do."



Interventions: Provide medication administration & medication management; Maintained a safe 
& supportive environment; Clear & simple instructions; Direction & encouragement  regarding 
performance of ADLs; monitored behaviors & maintained clear boundaries; Patient physical 
assessment & 1:1 patient interview; Therapeutic conversation & active listening; Patient 
education & monitoring.

 

Response: Patient is pleasant and cooperative but continues to appear fatigued and 
depressed; compliant with medication. Reported he removed his Nicotine patch. No negative 
behaviors presented. He remained isolated to his room and refused HS snack; patient is 
observed sleeping and does not appear to be having difficulty. 



Plan: Pt is conserved and awaiting placement.

## 2023-02-02 NOTE — NUR
Nursing Progress Note      



Problem: Per 5150 pt. is here for SA by OD on street drugs after command hallucinations 
telling him to do so. Pt. states that he took "Fentanyl, black china, meth, 8 ball" in 
suicide attempt. Pt. states he feels hopeless. Pt. is also delusional, stating, "They won't 
give me my billion dollars". Pt. states, "I tried killing myself because Im a free renetta. 
They dont love me but I love them. Yes I do."



Interventions: Provide medication administration & medication management; Maintained a safe 
& supportive environment; Clear & simple instructions; Direction & encouragement  regarding 
performance of ADLs; monitored behaviors & maintained clear boundaries; Patient physical 
assessment & 1:1 patient interview; Therapeutic conversation & active listening; Patient 
education & monitoring.

 

Response:  Patient awakens at 07:15 and requests his medications which were administered.  
Soft diet ordered and it was easier for him to eat his food.  Patient complains that the ice 
shake that he gets, he cannot drink because it makes his teeth hurt more.  Patient is calm 
and cooperative today.  He is pacing hallways either by himself or with another peer.  
Patient attempted to call his conservator Amy, because she has not brought him clothing.  
Patient checks in frequently with RN.  In the afternoon, patient stated that he felt 
depressed, at not being able to be placed.



Patient complains of 7/10 teeth pain, Anbesol, Tylenol, Suboxone, Naprosyn given for pain, 
with varying degrees of relief. 



Plan: Pt is conserved and awaiting placement.

## 2023-02-03 VITALS — SYSTOLIC BLOOD PRESSURE: 95 MMHG | DIASTOLIC BLOOD PRESSURE: 50 MMHG

## 2023-02-03 VITALS — SYSTOLIC BLOOD PRESSURE: 102 MMHG | DIASTOLIC BLOOD PRESSURE: 66 MMHG

## 2023-02-03 LAB
ALBUMIN SERPL BCP-MCNC: 3.9 G/DL (ref 3.4–5)
ALBUMIN/GLOB SERPL: 1.4 {RATIO} (ref 1.1–1.5)
ALP SERPL-CCNC: 76 IU/L (ref 46–116)
ALT SERPL W P-5'-P-CCNC: 19 U/L (ref 12–78)
ANION GAP SERPL CALCULATED.3IONS-SCNC: 3 MMOL/L (ref 8–16)
AST SERPL W P-5'-P-CCNC: 18 U/L (ref 10–37)
BASOPHILS # BLD AUTO: 0.1 X10'3 (ref 0–0.2)
BASOPHILS NFR BLD AUTO: 1.2 % (ref 0–1)
BILIRUB SERPL-MCNC: 0.5 MG/DL (ref 0.1–1)
BUN SERPL-MCNC: 10 MG/DL (ref 7–18)
BUN/CREAT SERPL: 12.7 (ref 5.4–32)
CALCIUM SERPL-MCNC: 8.6 MG/DL (ref 8.5–10.1)
CHLORIDE SERPL-SCNC: 98 MMOL/L (ref 99–107)
CO2 SERPL-SCNC: 30.9 MMOL/L (ref 24–32)
CREAT SERPL-MCNC: 0.79 MG/DL (ref 0.6–1.1)
EOSINOPHIL # BLD AUTO: 0.5 X10'3 (ref 0–0.9)
EOSINOPHIL NFR BLD AUTO: 9.9 % (ref 0–6)
ERYTHROCYTE [DISTWIDTH] IN BLOOD BY AUTOMATED COUNT: 13.2 % (ref 11.5–14.5)
GFR SERPL CREATININE-BSD FRML MDRD: > 90 ML/MIN
GLUCOSE SERPL-MCNC: 91 MG/DL (ref 70–104)
HCT VFR BLD AUTO: 35.7 % (ref 42–52)
HGB BLD-MCNC: 12.3 G/DL (ref 14–17.9)
LYMPHOCYTES # BLD AUTO: 1.3 X10'3 (ref 1.1–4.8)
LYMPHOCYTES NFR BLD AUTO: 27.4 % (ref 21–51)
MCH RBC QN AUTO: 31.4 PG (ref 27–31)
MCHC RBC AUTO-ENTMCNC: 34.4 G/DL (ref 33–36.5)
MCV RBC AUTO: 91.3 FL (ref 78–98)
MONOCYTES # BLD AUTO: 0.3 X10'3 (ref 0–0.9)
MONOCYTES NFR BLD AUTO: 6.8 % (ref 2–12)
NEUTROPHILS # BLD AUTO: 2.7 X10'3 (ref 1.8–7.7)
NEUTROPHILS NFR BLD AUTO: 54.7 % (ref 42–75)
PLATELET # BLD AUTO: 191 X10'3 (ref 140–440)
PMV BLD AUTO: 7.4 FL (ref 7.4–10.4)
POTASSIUM SERPL-SCNC: 4.4 MMOL/L (ref 3.5–5.1)
PROT SERPL-MCNC: 6.6 G/DL (ref 6.4–8.2)
RBC # BLD AUTO: 3.91 X10'6 (ref 4.7–6.1)
SODIUM SERPL-SCNC: 132 MMOL/L (ref 135–145)
WBC # BLD AUTO: 4.9 X10'3 (ref 4.5–11)

## 2023-02-03 RX ADMIN — BENZOCAINE PRN APPLIC: 200 SOLUTION TOPICAL at 13:27

## 2023-02-03 RX ADMIN — DIVALPROEX SODIUM SCH MG: 250 TABLET, DELAYED RELEASE ORAL at 20:48

## 2023-02-03 RX ADMIN — BENZTROPINE MESYLATE SCH MG: 1 TABLET ORAL at 20:47

## 2023-02-03 RX ADMIN — BENZTROPINE MESYLATE SCH MG: 1 TABLET ORAL at 07:51

## 2023-02-03 RX ADMIN — NICOTINE SCH PATCH: 14 PATCH, EXTENDED RELEASE TRANSDERMAL at 07:52

## 2023-02-03 RX ADMIN — Medication SCH PACKET: at 20:46

## 2023-02-03 RX ADMIN — THERA TABS SCH EACH: TAB at 07:51

## 2023-02-03 RX ADMIN — DOCUSATE SODIUM SCH MG: 100 CAPSULE, LIQUID FILLED ORAL at 20:49

## 2023-02-03 RX ADMIN — BUPRENORPHINE AND NALOXONE SCH TAB: 2; .5 TABLET SUBLINGUAL at 07:54

## 2023-02-03 RX ADMIN — BENZOCAINE PRN APPLIC: 200 SOLUTION TOPICAL at 21:03

## 2023-02-03 RX ADMIN — DIVALPROEX SODIUM SCH MG: 250 TABLET, DELAYED RELEASE ORAL at 07:50

## 2023-02-03 RX ADMIN — BUPRENORPHINE AND NALOXONE SCH TAB: 2; .5 TABLET SUBLINGUAL at 15:16

## 2023-02-03 RX ADMIN — Medication SCH MMU: at 07:51

## 2023-02-03 RX ADMIN — BENZOCAINE PRN APPLIC: 200 SOLUTION TOPICAL at 09:08

## 2023-02-03 RX ADMIN — NICOTINE POLACRILEX PRN MG: 2 LOZENGE ORAL at 15:57

## 2023-02-03 RX ADMIN — DOCUSATE SODIUM SCH MG: 100 CAPSULE, LIQUID FILLED ORAL at 07:50

## 2023-02-03 NOTE — NUR
Nursing Progress Note      



Problem: Per 5150 pt. is here for SA by OD on street drugs after command hallucinations 
telling him to do so. Pt. states that he took "Fentanyl, black china, meth, 8 ball" in 
suicide attempt. Pt. states he feels hopeless. Pt. is also delusional, stating, "They won't 
give me my billion dollars". Pt. states, "I tried killing myself because Im a free renetta. 
They dont love me but I love them. Yes I do."



Interventions: Provide medication administration & medication management; Maintained a safe 
& supportive environment; Clear & simple instructions; Direction & encouragement  regarding 
performance of ADLs; monitored behaviors & maintained clear boundaries; Patient physical 
assessment & 1:1 patient interview; Therapeutic conversation & active listening; Patient 
education & monitoring.

 

Response: Patient sleeping at change of shift. When awaken for bedtime medications pt. was 
pleasant and cooperative. Compliant with medications.  He asked what time it was. Isolated 
to room. Denies SI,HI, AH, VH. Patient went back to bed after taking medications. Observed 
sleeping with no difficulty. 



Plan: Pt is conserved and awaiting placement.

## 2023-02-03 NOTE — NUR
Nursing Progress Note: [place]



Problem: 

Patient is 5150 status. Per admit note "[place] the 
[place] yourself". During interview pt. endorses SI without a plan, as well as 
auditory hallucinations, states he hears random words like, "Chance". Pt. reports he had 
been living at [facility] for 4 months before he relapsed 2 days ago, drinking whiskey 
until he blacked out and snorting meth. Pt. reports he is now homeless. 



Interventions:

 Medication administration, 1:1 MH assessment, maintained a safe and supportive environment, 
 provided clear and simple instructions, provided encouragement regarding performance of 
ADLs, monitored behaviors and maintained clear boundaries, maintained Q15 minute safety 
checks.



Response:

Pt. received asleep, awoke for his medications, and was taken without hesitation. He denies 
SI, HI, AH, VH, and reports he was going to attend group today. Pt. spent most of the shift 
walking the halls and rapping with a male cohort. He c/o dental pain X2 this shift and was 
given Ambesol; med was effective. This writer to f/u with SW regarding any f/u dental 
appointments.  He is currently on a reward system for good behavior and continues to 
participate. He ate all meals in the main dining room with cohorts, without issues. He 
requires constant feedback and validation about his behavior, and reports Im working hard 
to do good He perseverates about Bahai and his clothing. Pt. has a flat affect and 
presents as down casted. He has fair hygiene, short hair, and wears street clothes. 

Pt. reported scant bleeding with BMs; provider at the bedside, N.O CBC

 

Plan:

Pt requires stabilization with medication adjustment and management in a safe and 
therapeutic environment.

## 2023-02-04 VITALS — DIASTOLIC BLOOD PRESSURE: 50 MMHG | SYSTOLIC BLOOD PRESSURE: 98 MMHG

## 2023-02-04 VITALS — DIASTOLIC BLOOD PRESSURE: 67 MMHG | SYSTOLIC BLOOD PRESSURE: 91 MMHG

## 2023-02-04 RX ADMIN — THERA TABS SCH EACH: TAB at 07:34

## 2023-02-04 RX ADMIN — DOCUSATE SODIUM SCH MG: 100 CAPSULE, LIQUID FILLED ORAL at 07:35

## 2023-02-04 RX ADMIN — NICOTINE POLACRILEX PRN MG: 2 LOZENGE ORAL at 15:03

## 2023-02-04 RX ADMIN — DIVALPROEX SODIUM SCH MG: 250 TABLET, DELAYED RELEASE ORAL at 07:35

## 2023-02-04 RX ADMIN — Medication SCH MMU: at 07:35

## 2023-02-04 RX ADMIN — NICOTINE SCH PATCH: 14 PATCH, EXTENDED RELEASE TRANSDERMAL at 07:33

## 2023-02-04 RX ADMIN — DOCUSATE SODIUM SCH MG: 100 CAPSULE, LIQUID FILLED ORAL at 20:52

## 2023-02-04 RX ADMIN — BUPRENORPHINE AND NALOXONE SCH TAB: 2; .5 TABLET SUBLINGUAL at 15:02

## 2023-02-04 RX ADMIN — BENZTROPINE MESYLATE SCH MG: 1 TABLET ORAL at 07:35

## 2023-02-04 RX ADMIN — DIVALPROEX SODIUM SCH MG: 250 TABLET, DELAYED RELEASE ORAL at 20:53

## 2023-02-04 RX ADMIN — BUPRENORPHINE AND NALOXONE SCH TAB: 2; .5 TABLET SUBLINGUAL at 07:35

## 2023-02-04 RX ADMIN — Medication SCH PACKET: at 20:57

## 2023-02-04 RX ADMIN — BENZTROPINE MESYLATE SCH MG: 1 TABLET ORAL at 20:53

## 2023-02-04 NOTE — NUR
Nursing Progress Note      



Problem: Per 5150 pt. is here for SA by OD on street drugs after command hallucinations 
telling him to do so. Pt. states that he took "Fentanyl, black china, meth, 8 ball" in 
suicide attempt. Pt. states he feels hopeless. Pt. is also delusional, stating, "They won't 
give me my billion dollars". Pt. states, "I tried killing myself because Im a free renetta. 
They dont love me but I love them. Yes I do."



Interventions: Provide medication administration & medication management; Maintained a safe 
& supportive environment; Clear & simple instructions; Direction & encouragement  regarding 
performance of ADLs; monitored behaviors & maintained clear boundaries; Patient physical 
assessment & 1:1 patient interview; Therapeutic conversation & active listening; Patient 
education & monitoring.

 

Response: Received patient in community room after dinner. Pt watched TV and socialized a 
bit before going to lay down. Pt pleasant and cooperative, denying SI,HI,AH,VH and 
depression. Pt reports having a good day, except for my tooth. Pt took PM meds and fell 
asleep early. Pt did not ask for prn meds to sleep or for anxiety.



Plan: Pt is conserved and awaiting placement.

## 2023-02-04 NOTE — NUR
Nursing Progress Note:   



Problem: 

Patient is 5150 status. Per admit note "[place] of [place] yourself". During interview pt. endorses SI without a plan, as well as 
auditory hallucinations, states he hears random words like, "Chance". Pt. reports he had 
been living at [facility] for 4 months before he relapsed 2 days ago, drinking whiskey 
until he blacked out and snorting meth. Pt. reports he is now homeless. 



Interventions:

 Medication administration, 1:1 MH assessment, maintained a safe and supportive environment, 
 provided clear and simple instructions, provided encouragement regarding performance of 
ADLs, monitored behaviors and maintained clear boundaries, maintained Q15 minute safety 
checks.



Response:

Received Pt in bed sleeping w/o distress at the beginning of the shift. Pt woke and was 
cooperative with vitals and went to sleep again. Pt woke for breakfast and ate well. He took 
meds throughout the day w/o issue and used nicotine Lozenges and Anbesol PRN. Pt talkative 
and spoke positively about getting out of [facility] and having more freedoms to get his son a 
gift. Pt had full range of affect and was labile at times, but responded well to attention 
and positive reinforcement. There were no groups today and he did not go to [facility] because 
his tooth was hurting.



Plan:

Pt requires stabilization with medication adjustment and management in a safe and 
therapeutic environment.








-------------------------------------------------------------------------------

Addendum: 02/04/23 at 2211 by Elgin Cool RN

-------------------------------------------------------------------------------

Discard note above:



Nursing Progress Note      



Problem: Per 5150 pt. is here for SA by OD on street drugs after command hallucinations 
telling him to do so. Pt. states that he took "Fentanyl, black china, meth, 8 ball" in 
suicide attempt. Pt. states he feels hopeless. Pt. is also delusional, stating, "They won't 
give me my billion dollars". Pt. states, "I tried killing myself because Im a free renetta. 
They dont love me but I love them. Yes I do."



Interventions: Provide medication administration & medication management; Maintained a safe 
& supportive environment; Clear & simple instructions; Direction & encouragement  regarding 
performance of ADLs; monitored behaviors & maintained clear boundaries; Patient physical 
assessment & 1:1 patient interview; Therapeutic conversation & active listening; Patient 
education & monitoring.

 

Response: Received Pt in bed sleeping w/o distress at the beginning of the shift. Pt woke 
and was cooperative with vitals and went to sleep again. Pt woke for breakfast and ate well. 
He took meds throughout the day w/o issue and used nicotine Lozenges and Anbesol PRN. Pt 
talkative and spoke positively about getting out of [facility] and having more freedoms to get his 
son a gift. Pt had full range of affect and was labile at times, but responded well to 
attention and positive reinforcement. There were no groups today and [facility] not [facility] 
because his tooth was hurting. 



Plan: Pt is conserved and awaiting placement.

## 2023-02-04 NOTE — NUR
Nursing Progress Note      



Problem: Per 5150 pt. is here for SA by OD on street drugs after command hallucinations 
telling him to do so. Pt. states that he took "Fentanyl, black china, meth, 8 ball" in 
suicide attempt. Pt. states he feels hopeless. Pt. is also delusional, stating, "They won't 
give me my billion dollars". Pt. states, "I tried killing myself because Im a free renetta. 
They dont love me but I love them. Yes I do."



Interventions: Provide medication administration & medication management; Maintained a safe 
& supportive environment; Clear & simple instructions; Direction & encouragement  regarding 
performance of ADLs; monitored behaviors & maintained clear boundaries; Patient physical 
assessment & 1:1 patient interview; Therapeutic conversation & active listening; Patient 
education & monitoring.

 

Response: Received patient asleep in room at change of shift where he stayed all night. Did 
not participate in snack. Compliant with medications. PRN Anbesol provided. Patient reported 
removing nicotine patch. Patient denies SI,HI,AH,VH and depression. However, patient appears 
depressed to this writer. Very little talk when trying to engage in conversation with 
patient. 

Appears to be sleeping without difficulty. 



Plan: Pt is conserved and awaiting placement.

## 2023-02-05 VITALS — SYSTOLIC BLOOD PRESSURE: 113 MMHG | DIASTOLIC BLOOD PRESSURE: 57 MMHG

## 2023-02-05 VITALS — DIASTOLIC BLOOD PRESSURE: 69 MMHG | SYSTOLIC BLOOD PRESSURE: 123 MMHG

## 2023-02-05 RX ADMIN — NICOTINE POLACRILEX PRN MG: 2 LOZENGE ORAL at 14:49

## 2023-02-05 RX ADMIN — NICOTINE SCH PATCH: 14 PATCH, EXTENDED RELEASE TRANSDERMAL at 07:50

## 2023-02-05 RX ADMIN — DIVALPROEX SODIUM SCH MG: 250 TABLET, DELAYED RELEASE ORAL at 20:09

## 2023-02-05 RX ADMIN — BUPRENORPHINE AND NALOXONE SCH TAB: 2; .5 TABLET SUBLINGUAL at 07:52

## 2023-02-05 RX ADMIN — BUPRENORPHINE AND NALOXONE SCH TAB: 2; .5 TABLET SUBLINGUAL at 15:05

## 2023-02-05 RX ADMIN — BENZTROPINE MESYLATE SCH MG: 1 TABLET ORAL at 07:50

## 2023-02-05 RX ADMIN — DIVALPROEX SODIUM SCH MG: 250 TABLET, DELAYED RELEASE ORAL at 07:51

## 2023-02-05 RX ADMIN — BENZOCAINE PRN APPLIC: 200 SOLUTION TOPICAL at 17:47

## 2023-02-05 RX ADMIN — BENZOCAINE PRN APPLIC: 200 SOLUTION TOPICAL at 11:14

## 2023-02-05 RX ADMIN — DOCUSATE SODIUM SCH MG: 100 CAPSULE, LIQUID FILLED ORAL at 20:09

## 2023-02-05 RX ADMIN — BENZTROPINE MESYLATE SCH MG: 1 TABLET ORAL at 20:08

## 2023-02-05 RX ADMIN — DOCUSATE SODIUM SCH MG: 100 CAPSULE, LIQUID FILLED ORAL at 07:51

## 2023-02-05 RX ADMIN — THERA TABS SCH EACH: TAB at 07:52

## 2023-02-05 RX ADMIN — Medication SCH PACKET: at 20:09

## 2023-02-05 RX ADMIN — BENZOCAINE PRN APPLIC: 200 SOLUTION TOPICAL at 07:56

## 2023-02-05 RX ADMIN — Medication SCH MMU: at 07:51

## 2023-02-05 NOTE — NUR
Nursing Progress Note      



Problem: Per 5150 pt. is here for SA by OD on street drugs after command hallucinations 
telling him to do so. Pt. states that he took "Fentanyl, black china, meth, 8 ball" in 
suicide attempt. Pt. states he feels hopeless. Pt. is also delusional, stating, "They won't 
give me my billion dollars". Pt. states, "I tried killing myself because Im a free renetta. 
They dont love me but I love them. Yes I do."



Interventions: Provide medication administration & medication management; Maintained a safe 
& supportive environment; Clear & simple instructions; Direction & encouragement  regarding 
performance of ADLs; monitored behaviors & maintained clear boundaries; Patient physical 
assessment & 1:1 patient interview; Therapeutic conversation & active listening; Patient 
education & monitoring.

 

Response:  Patient was awake at 0630 this morning and then decided to return back to bed, 
but then got up again at 0715.  Patient reports he is doing well, but is still experiencing 
upper and lower right tooth pain.  Patient has requested Anbesol approximately every 3 hours 
on these teeth as well as Tylenol prn for pain associated with his tooth pain. Patient has 
become very sensitive to hots, colds, and states "I can't eat my food on my tray."  Patient 
was ordered a protein shake for lunch and also received one on his morning tray.  Will 
follow up with Silvana tomorrow as patient is asking when his next dental appointment is 
scheduled.  Patient ambulated up and down the singh throughout most of the day with the 
headphones on.  Patient also went outside today at 1130-12:00 and enjoyed his time out.  



Plan: Pt is conserved and awaiting placement.

## 2023-02-05 NOTE — NUR
Nursing Progress Note      



Problem: Per 5150 pt. is here for SA by OD on street drugs after command hallucinations 
telling him to do so. Pt. states that he took "Fentanyl, black china, meth, 8 ball" in 
suicide attempt. Pt. states he feels hopeless. Pt. is also delusional, stating, "They won't 
give me my billion dollars". Pt. states, "I tried killing myself because Im a free renetta. 
They dont love me but I love them. Yes I do."



Interventions: Provide medication administration & medication management; Maintained a safe 
& supportive environment; Clear & simple instructions; Direction & encouragement  regarding 
performance of ADLs; monitored behaviors & maintained clear boundaries; Patient physical 
assessment & 1:1 patient interview; Therapeutic conversation & active listening; Patient 
education & monitoring.

 

Response:  Pt was napping at change of shift. Pt denies s/i and states he wants to sleep. Pt 
was up to take meds and declined a snack. Pt states he is not wearing a nicotine patch. Pt 
went back to sleep.



Plan: Pt is conserved and awaiting placement.

## 2023-02-05 NOTE — NUR
F/u 2/5: Pt PO continues to have periods of fluctuate though improved 

~% avg recent meals w/ smoothie TID, peaches/salad/cottage cheese 

WL, and double eggs WB meeting ~95% kcal/~92% protein estimated needs. Now 

on EC7 diet given tooth issues per EMR. LBM 2/2. No further nutrition 

interventions at this time. Will continue to follow.

Recommendations:

1) Continue Vegetarian diet per pt request; double eggs WB; smoothies

TIDWM; cottage cheese/peaches/salad WL; encourage PO intake

2) MVI supplementation per MD

3) Bowel care PRN

4) Weekly scaled weights

-------------------------------------------------------------------------------

Addendum: 02/05/23 at 0955 by Jordan Godfrey RD

-------------------------------------------------------------------------------

Amended: Links added.

## 2023-02-06 VITALS — DIASTOLIC BLOOD PRESSURE: 54 MMHG | SYSTOLIC BLOOD PRESSURE: 95 MMHG

## 2023-02-06 VITALS — SYSTOLIC BLOOD PRESSURE: 108 MMHG | DIASTOLIC BLOOD PRESSURE: 68 MMHG

## 2023-02-06 RX ADMIN — BENZTROPINE MESYLATE SCH MG: 1 TABLET ORAL at 20:21

## 2023-02-06 RX ADMIN — BENZTROPINE MESYLATE SCH MG: 1 TABLET ORAL at 07:20

## 2023-02-06 RX ADMIN — NICOTINE POLACRILEX PRN MG: 2 LOZENGE ORAL at 08:24

## 2023-02-06 RX ADMIN — BENZOCAINE PRN APPLIC: 200 SOLUTION TOPICAL at 08:25

## 2023-02-06 RX ADMIN — DOCUSATE SODIUM SCH MG: 100 CAPSULE, LIQUID FILLED ORAL at 07:20

## 2023-02-06 RX ADMIN — DIVALPROEX SODIUM SCH MG: 250 TABLET, DELAYED RELEASE ORAL at 07:20

## 2023-02-06 RX ADMIN — DOCUSATE SODIUM SCH MG: 100 CAPSULE, LIQUID FILLED ORAL at 20:22

## 2023-02-06 RX ADMIN — NICOTINE POLACRILEX PRN MG: 2 LOZENGE ORAL at 13:19

## 2023-02-06 RX ADMIN — BENZOCAINE PRN APPLIC: 200 SOLUTION TOPICAL at 17:52

## 2023-02-06 RX ADMIN — NICOTINE SCH PATCH: 14 PATCH, EXTENDED RELEASE TRANSDERMAL at 07:19

## 2023-02-06 RX ADMIN — THERA TABS SCH EACH: TAB at 07:21

## 2023-02-06 RX ADMIN — BUPRENORPHINE AND NALOXONE SCH TAB: 2; .5 TABLET SUBLINGUAL at 07:20

## 2023-02-06 RX ADMIN — Medication SCH MMU: at 07:20

## 2023-02-06 RX ADMIN — Medication SCH PACKET: at 20:22

## 2023-02-06 RX ADMIN — DIVALPROEX SODIUM SCH MG: 250 TABLET, DELAYED RELEASE ORAL at 20:21

## 2023-02-06 RX ADMIN — NICOTINE POLACRILEX PRN MG: 2 LOZENGE ORAL at 10:51

## 2023-02-06 RX ADMIN — NICOTINE POLACRILEX PRN MG: 2 LOZENGE ORAL at 17:12

## 2023-02-06 RX ADMIN — BUPRENORPHINE AND NALOXONE SCH TAB: 2; .5 TABLET SUBLINGUAL at 15:12

## 2023-02-06 NOTE — NUR
Nursing Progress Note      



Problem: Per 5150 pt. is here for SA by OD on street drugs after command hallucinations 
telling him to do so. Pt. states that he took "Fentanyl, black china, meth, 8 ball" in 
suicide attempt. Pt. states he feels hopeless. Pt. is also delusional, stating, "They won't 
give me my billion dollars". Pt. states, "I tried killing myself because Im a free renetta. 
They dont love me but I love them. Yes I do."



Interventions: Provide medication administration & medication management; Maintained a safe 
& supportive environment; Clear & simple instructions; Direction & encouragement  regarding 
performance of ADLs; monitored behaviors & maintained clear boundaries; Patient physical 
assessment & 1:1 patient interview; Therapeutic conversation & active listening; Patient 
education & monitoring.

 

Response:  Pt was napping in his room at change of shift. Pt reports tylenol given prior to 
shift was helpful. Pt reports "the hole in my tooth is getting bigger, I really need a new 
dental appt." Pt reports he is feeling good otherwise. Pt states he threw his nicotine patch 
in the garbage and isnt wearing one. Pt remained napping in his room during the evening and 
declined having a snack stating he is trying to lose weight. Pt took HS meds and went to 
sleep.



Plan: Pt is conserved and awaiting placement.


-------------------------------------------------------------------------------

Addendum: 02/07/23 at 0535 by Jenna Cisneros RN

-------------------------------------------------------------------------------

Pt woke up early this morning and took a shower. Pt states "I feel like I've been sleeping 
too much, I need to get up."

## 2023-02-06 NOTE — NUR
Nursing Progress Note      



Problem: Per 5150 pt. is here for SA by OD on street drugs after command hallucinations 
telling him to do so. Pt. states that he took "Fentanyl, black china, meth, 8 ball" in 
suicide attempt. Pt. states he feels hopeless. Pt. is also delusional, stating, "They won't 
give me my billion dollars". Pt. states, "I tried killing myself because Im a free renetta. 
They dont love me but I love them. Yes I do."



Interventions: Provide medication administration & medication management; Maintained a safe 
& supportive environment; Clear & simple instructions; Direction & encouragement  regarding 
performance of ADLs; monitored behaviors & maintained clear boundaries; Patient physical 
assessment & 1:1 patient interview; Therapeutic conversation & active listening; Patient 
education & monitoring.

 

Response:  Received patient who woke up at 0630 and came out of his room and reported I 
slept well, but I still feel tired.  Patient then started ambulating in the hallway he 
showered immediately                        and took his morning medications at 0745.  
Patient ate breakfast in the Community Room but only drank his protein shake then received 
Tylenol for c/o left upper and lower tooth pain rated at an 8 on a scale of 1-10.  Patient 
has also been receiving Anbesol at least q3 hours for c/o tooth pain.  Patient ambulated in 
the hallway the entire of the morning with peers and enjoyed talking with them and also wore 
headphones at times.  Patient was happy and was dancing at times in the middle of the 
hallway, and reported his two goals are to weigh less than 200 lbs. and be Vegetarian.  
Patient smiled a BIG smile when telling this Writer this information.  Patient was ordered 
specific food for lunch that he requested and really enjoyed it and ate 100%.  Spoke with 
Jin Alcaraz RN, and requested they discuss the future referral and treatment of patients 
tooth pain at todays 10:00 am Treatment Meeting where MARLO Pina as well as the 
Physicians and PA are all present, that is becoming more painful each day, and the patient 
does not always get relief from Tylenol but does not want his Suboxone discontinued to take 
stronger pain medications.  Patient is requesting that he just get a dental appointment made 
soon.  



Plan: Pt is conserved and awaiting placement.

## 2023-02-07 VITALS — SYSTOLIC BLOOD PRESSURE: 127 MMHG | DIASTOLIC BLOOD PRESSURE: 78 MMHG

## 2023-02-07 VITALS — DIASTOLIC BLOOD PRESSURE: 50 MMHG | SYSTOLIC BLOOD PRESSURE: 90 MMHG

## 2023-02-07 RX ADMIN — DOCUSATE SODIUM SCH MG: 100 CAPSULE, LIQUID FILLED ORAL at 20:36

## 2023-02-07 RX ADMIN — BUPRENORPHINE AND NALOXONE SCH TAB: 2; .5 TABLET SUBLINGUAL at 07:08

## 2023-02-07 RX ADMIN — Medication SCH PACKET: at 20:35

## 2023-02-07 RX ADMIN — BENZTROPINE MESYLATE SCH MG: 1 TABLET ORAL at 20:36

## 2023-02-07 RX ADMIN — BENZOCAINE PRN APPLIC: 200 SOLUTION TOPICAL at 12:58

## 2023-02-07 RX ADMIN — BUPRENORPHINE AND NALOXONE SCH TAB: 2; .5 TABLET SUBLINGUAL at 15:15

## 2023-02-07 RX ADMIN — NICOTINE POLACRILEX PRN MG: 2 LOZENGE ORAL at 07:32

## 2023-02-07 RX ADMIN — BENZOCAINE PRN APPLIC: 200 SOLUTION TOPICAL at 08:00

## 2023-02-07 RX ADMIN — NICOTINE POLACRILEX PRN MG: 2 LOZENGE ORAL at 10:10

## 2023-02-07 RX ADMIN — DOCUSATE SODIUM SCH MG: 100 CAPSULE, LIQUID FILLED ORAL at 07:07

## 2023-02-07 RX ADMIN — NICOTINE POLACRILEX PRN MG: 2 LOZENGE ORAL at 15:15

## 2023-02-07 RX ADMIN — THERA TABS SCH EACH: TAB at 07:08

## 2023-02-07 RX ADMIN — BENZTROPINE MESYLATE SCH MG: 1 TABLET ORAL at 07:07

## 2023-02-07 RX ADMIN — DIVALPROEX SODIUM SCH MG: 250 TABLET, DELAYED RELEASE ORAL at 20:36

## 2023-02-07 RX ADMIN — NICOTINE SCH PATCH: 14 PATCH, EXTENDED RELEASE TRANSDERMAL at 07:06

## 2023-02-07 RX ADMIN — Medication SCH MMU: at 07:07

## 2023-02-07 RX ADMIN — DIVALPROEX SODIUM SCH MG: 250 TABLET, DELAYED RELEASE ORAL at 07:07

## 2023-02-07 NOTE — NUR
Nursing Progress Note      



Problem: Per 5150 pt. is here for SA by OD on street drugs after command hallucinations 
telling him to do so. Pt. states that he took "Fentanyl, black china, meth, 8 ball" in 
suicide attempt. Pt. states he feels hopeless. Pt. is also delusional, stating, "They won't 
give me my billion dollars". Pt. states, "I tried killing myself because Im a free renetta. 
They dont love me but I love them. Yes I do."



Interventions: Provide medication administration & medication management; Maintained a safe 
& supportive environment; Clear & simple instructions; Direction & encouragement  regarding 
performance of ADLs; monitored behaviors & maintained clear boundaries; Patient physical 
assessment & 1:1 patient interview; Therapeutic conversation & active listening; Patient 
education & monitoring.

 

Response:  Received patient at 0620 when he was awake early and he reported Shauna already 
taken a shower.  Patient reports he has had a good nights sleep, and started ambulating up 
and down the hallway until he took his medications at 0730.  Patient requested Tylenol at 
that time for c/o pain of 8 on a scale of 1-10 for left upper and lower tooth pain.  
Patient received all of his medications and while this Writer was passing other patients 
their 0800 medications, patient interrupted and requested Anbesol.  Informed the patient 
that I would be with him in about 5 or more minutes.  Patient then went to the Community 
Room for breakfast and informed this Writer Forget it, and dont get me the Anbesol if you 
dont have time for me.  Informed the patient that other Nursing Staff were using the 
[facility] at the time, and I would get him his medication as soon as I could.  Patient got up 
and put his tray back in the cart and went and laid down for only a few minutes then 
returned to this Writers medication cart and became much more intrusive and appeared to be 
visibly upset and demanding I want to now.  Only about 4 minutes had passed between the 
time he requested the medication, to the time it was administered, when the patient was 
located lying in bed and reported Im going to have a panic attack.  Discussed with the 
patient that I understand that he has important needs, but I am also assigned 6 patients who 
are all requesting some of the same things that he needs.  Patient was quiet and then stated 
Alright, I need to be more patient.  Patient then continued to perseverate on multiple 
items including I need clothing, I need to go to the dentist, I need someone to help me out 
and no one does ever.  Patient was given Ativan 2mg po at 1100, with some relief and less 
perseverating in about 45 minutes.  Patient continued to ambulate in the hallways and would 
take time outs by resting in his bed.  Patient is hyper-focused on his tooth pain at this 
time, but reports the Tylenol 975mg really helps.  Will continue to assist patient and 
help him out with some of the things that he reports that he Needs immediately. 



Plan: Pt is conserved and awaiting placement.

## 2023-02-07 NOTE — NUR
Mailed $500 [place] check to Public Guardian at her request to deposit it into his account. 
Sent it via certified mail.



[facility] Public Guardian

Attn: Linda Lord

[address] [place]  [address]



SIDDHARTHA Salamanca

## 2023-02-07 NOTE — NUR
F/u 2/7: Pt requesting Ensures does not like smoothies since too cold. Per 

RN, pt true PO intake not consistent w/ documented ~78% avg vegetarian 

diet much less recently. RD d/w RN noted pt focusing on wt loss per notes 

in EMR; per RN importance of nutrition intake has been reviewed w/ pt 

today and pt encouraged to focus on optimal meals intake. RD d/w RN 

recommends IF Ensure once daily and consider liberalizing to regular diet 

to assist kcal intake if pt/MD agreeable; previously put on vegetarian 

diet per pt request. 

Recommendations:

1) Continue Vegetarian diet per pt request; double eggs WB; cottage cheese/peaches/salad WL; 
encourage PO intake. Liberalize to regular diet if pt agreeable to assist meeting needs

2) IF ONS; Ensure Plus High Protein once daily 

3) MVI supplementation per MD

4) Bowel care PRN

5) Weekly scaled weights

-------------------------------------------------------------------------------

Addendum: 02/07/23 at 1132 by Jordan Godfrey RD

-------------------------------------------------------------------------------

Amended: Links added.

## 2023-02-08 VITALS — SYSTOLIC BLOOD PRESSURE: 96 MMHG | DIASTOLIC BLOOD PRESSURE: 39 MMHG

## 2023-02-08 VITALS — DIASTOLIC BLOOD PRESSURE: 77 MMHG | SYSTOLIC BLOOD PRESSURE: 108 MMHG

## 2023-02-08 RX ADMIN — BUPRENORPHINE AND NALOXONE SCH TAB: 2; .5 TABLET SUBLINGUAL at 15:21

## 2023-02-08 RX ADMIN — DIVALPROEX SODIUM SCH MG: 250 TABLET, DELAYED RELEASE ORAL at 20:40

## 2023-02-08 RX ADMIN — BENZTROPINE MESYLATE SCH MG: 1 TABLET ORAL at 20:40

## 2023-02-08 RX ADMIN — Medication SCH PACKET: at 20:39

## 2023-02-08 RX ADMIN — BUPRENORPHINE AND NALOXONE SCH TAB: 2; .5 TABLET SUBLINGUAL at 07:27

## 2023-02-08 RX ADMIN — BENZTROPINE MESYLATE SCH MG: 1 TABLET ORAL at 07:26

## 2023-02-08 RX ADMIN — NICOTINE POLACRILEX PRN MG: 2 LOZENGE ORAL at 11:59

## 2023-02-08 RX ADMIN — BENZOCAINE PRN APPLIC: 200 SOLUTION TOPICAL at 08:20

## 2023-02-08 RX ADMIN — DIVALPROEX SODIUM SCH MG: 250 TABLET, DELAYED RELEASE ORAL at 07:26

## 2023-02-08 RX ADMIN — THERA TABS SCH EACH: TAB at 07:25

## 2023-02-08 RX ADMIN — DOCUSATE SODIUM SCH MG: 100 CAPSULE, LIQUID FILLED ORAL at 07:26

## 2023-02-08 RX ADMIN — NICOTINE SCH PATCH: 14 PATCH, EXTENDED RELEASE TRANSDERMAL at 07:27

## 2023-02-08 RX ADMIN — NICOTINE POLACRILEX PRN MG: 2 LOZENGE ORAL at 07:35

## 2023-02-08 RX ADMIN — Medication SCH MMU: at 07:27

## 2023-02-08 RX ADMIN — DOCUSATE SODIUM SCH MG: 100 CAPSULE, LIQUID FILLED ORAL at 20:40

## 2023-02-08 NOTE — NUR
Nursing Progress Note      



Problem: Per 5150 pt. is here for SA by OD on street drugs after command hallucinations 
telling him to do so. Pt. states that he took "Fentanyl, black china, meth, 8 ball" in 
suicide attempt. Pt. states he feels hopeless. Pt. is also delusional, stating, "They won't 
give me my billion dollars". Pt. states, "I tried killing myself because Im a free renetta. 
They dont love me but I love them. Yes I do."



Interventions: Provided 1:1 assessment with therapeutic communication and active listening; 
Provided  medication administration/education/monitoring; Maintained a supportive 
environment; Provided clear & simple instructions; Maintained [id number] safety checks. 

 

Response: Pt up in the morning pleasant socializing with staff. Pt spent most of the day 
pacing the halls with headphones on. Pt speaks in a low monotone voice and appears depressed 
at times during the day. Pt shared his thoughts with this writer about living with his Mom 
and his concerns for a 16 year old sister. He shared he thinks about helping support his 
mother, getting a job and taking care of his sister. Pt states, "I know I have to not use 
drugs and take my Bipolar medications." He continues to have tooth pain uses Anbesol and 
Tylenol for the pain.



Plan: Pt is conserved and awaiting placement.

## 2023-02-08 NOTE — NUR
Nursing Progress Note      



Problem: Per 5150 pt. is here for SA by OD on street drugs after command hallucinations 
telling him to do so. Pt. states that he took "Fentanyl, black china, meth, 8 ball" in 
suicide attempt. Pt. states he feels hopeless. Pt. is also delusional, stating, "They won't 
give me my billion dollars". Pt. states, "I tried killing myself because Im a free renetta. 
They dont love me but I love them. Yes I do."



Interventions: Provided 1:1 assessment with therapeutic communication and active listening; 
Provided  medication administration/education/monitoring; Maintained a supportive 
environment; Provided clear & simple instructions; Maintained [id number] safety checks. 

 

Response: Pt was napping in bed at change of shift. He is waking up early and going to bed 
early lately. Pt states "I was active today. I'm ok." Reports his mood as "good", but affect 
is flat. Pts bp is 96/39 HR 56. Held patients ativan due to bp. Pt denies any symptoms. Pt 
c/o tooth pain 5/10, but declines anbesol and tylenol stating he wants to sleep. Pt also 
declines a snack. Pt states he threw away his nicotine lozenge earlier in the day. 



Plan: Pt is conserved and awaiting placement.

## 2023-02-08 NOTE — NUR
Nursing Progress Note      



Problem: Per 5150 pt. is here for SA by OD on street drugs after command hallucinations 
telling him to do so. Pt. states that he took "Fentanyl, black china, meth, 8 ball" in 
suicide attempt. Pt. states he feels hopeless. Pt. is also delusional, stating, "They won't 
give me my billion dollars". Pt. states, "I tried killing myself because Im a free renetta. 
They dont love me but I love them. Yes I do."



Interventions: Provide medication administration & medication management; Maintained a safe 
& supportive environment; Clear & simple instructions; Direction & encouragement  regarding 
performance of ADLs; monitored behaviors & maintained clear boundaries; Patient physical 
assessment & 1:1 patient interview; Therapeutic conversation & active listening; Patient 
education & monitoring.

 

Response:  Pt was sleeping in bed at change of shift. Pt was awaken for vitals, HS meds and 
states he is doing ok and just wants to sleep. Pt states he already took off his nicotine 
patch. Ativan was held, due to pts low BP 90/50 and HR of 51. 



Plan: Pt is conserved and awaiting placement.


-------------------------------------------------------------------------------

Addendum: 02/08/23 at 0516 by Jenna Cisneros RN

-------------------------------------------------------------------------------

Pt woke at 0500 and checked the clock. Asked patient if he was having tooth pain and needed 
a prn? Pt declined prn stating he was going back to sleep. Pt is calm, pleasant and 
cooperative.

## 2023-02-09 VITALS — DIASTOLIC BLOOD PRESSURE: 74 MMHG | SYSTOLIC BLOOD PRESSURE: 115 MMHG

## 2023-02-09 VITALS — SYSTOLIC BLOOD PRESSURE: 101 MMHG | DIASTOLIC BLOOD PRESSURE: 49 MMHG

## 2023-02-09 RX ADMIN — DIVALPROEX SODIUM SCH MG: 250 TABLET, DELAYED RELEASE ORAL at 07:41

## 2023-02-09 RX ADMIN — NICOTINE POLACRILEX PRN MG: 2 LOZENGE ORAL at 16:19

## 2023-02-09 RX ADMIN — Medication SCH PACKET: at 21:14

## 2023-02-09 RX ADMIN — BUPRENORPHINE AND NALOXONE SCH TAB: 2; .5 TABLET SUBLINGUAL at 15:10

## 2023-02-09 RX ADMIN — DOCUSATE SODIUM SCH MG: 100 CAPSULE, LIQUID FILLED ORAL at 21:12

## 2023-02-09 RX ADMIN — DOCUSATE SODIUM SCH MG: 100 CAPSULE, LIQUID FILLED ORAL at 07:41

## 2023-02-09 RX ADMIN — Medication SCH MMU: at 07:41

## 2023-02-09 RX ADMIN — NICOTINE SCH PATCH: 14 PATCH, EXTENDED RELEASE TRANSDERMAL at 08:00

## 2023-02-09 RX ADMIN — BENZTROPINE MESYLATE SCH MG: 1 TABLET ORAL at 21:12

## 2023-02-09 RX ADMIN — BENZOCAINE PRN APPLIC: 200 SOLUTION TOPICAL at 07:03

## 2023-02-09 RX ADMIN — BUPRENORPHINE AND NALOXONE SCH TAB: 2; .5 TABLET SUBLINGUAL at 07:42

## 2023-02-09 RX ADMIN — DIVALPROEX SODIUM SCH MG: 250 TABLET, DELAYED RELEASE ORAL at 21:11

## 2023-02-09 RX ADMIN — THERA TABS SCH EACH: TAB at 07:42

## 2023-02-09 RX ADMIN — BENZTROPINE MESYLATE SCH MG: 1 TABLET ORAL at 07:40

## 2023-02-09 NOTE — NUR
Nursing Progress Note: Mike      



Problem: Per 5150 pt. is here for SA by OD on street drugs after command hallucinations 
telling him to do so. Pt. states that he took "Fentanyl, black china, meth, 8 ball" in 
suicide attempt. Pt. states he feels hopeless. Pt. is also delusional, stating, "they won't 
give me my billion dollars". Pt. states, "I tried killing myself because Im a free renetta. 
They dont love me but I love them. Yes I do."



Interventions: 1:1 assessment, therapeutic conversation, active listening, ensured contract 
for safety, medication administration/education/monitoring, behavior monitoring and 
intervention as needed; reality orientation, distraction, redirection, limit setting, show 
of support, encouragement, positive reinforcement, and Q15 minute safety checks.



Response:  Pt was up before breakfast. Compliant with medication administration. Took AM 
medication as ordered. Headset offered and accepted; pacing unit. Pt expressed to this LVN 
during 1:1 SI/HI, denies AV/H. Endorses depressions/anxiety. Pt has poor insight/judgement 
to current situation. Disheveled appearance with fair hygiene. Pt ate lunch in community 
room. Multiple snacks given throughout shift. Pt napped in bedroom after lunch. Pt given PRN 
Tylenol, Ambesol, and Ativan given. Towards end of shift pt began to state he was not 
feeling well; vitals taken all WNL. Pt requests to wean off suboxone. Pt in pleasant mood 
throughout shift, no inappropriate behaviors or emotional outbursts. 



Plan: Pt is conserved and awaiting placement.

## 2023-02-10 VITALS — SYSTOLIC BLOOD PRESSURE: 112 MMHG | DIASTOLIC BLOOD PRESSURE: 59 MMHG

## 2023-02-10 VITALS — DIASTOLIC BLOOD PRESSURE: 76 MMHG | SYSTOLIC BLOOD PRESSURE: 120 MMHG

## 2023-02-10 RX ADMIN — Medication SCH PACKET: at 21:00

## 2023-02-10 RX ADMIN — BENZTROPINE MESYLATE SCH MG: 1 TABLET ORAL at 21:02

## 2023-02-10 RX ADMIN — BUPRENORPHINE AND NALOXONE SCH TAB: 2; .5 TABLET SUBLINGUAL at 07:21

## 2023-02-10 RX ADMIN — BUPRENORPHINE AND NALOXONE SCH TAB: 2; .5 TABLET SUBLINGUAL at 14:18

## 2023-02-10 RX ADMIN — DOCUSATE SODIUM SCH MG: 100 CAPSULE, LIQUID FILLED ORAL at 21:01

## 2023-02-10 RX ADMIN — Medication SCH MMU: at 07:20

## 2023-02-10 RX ADMIN — DIVALPROEX SODIUM SCH MG: 250 TABLET, DELAYED RELEASE ORAL at 21:03

## 2023-02-10 RX ADMIN — NICOTINE POLACRILEX PRN MG: 2 LOZENGE ORAL at 08:17

## 2023-02-10 RX ADMIN — THERA TABS SCH EACH: TAB at 07:21

## 2023-02-10 RX ADMIN — BENZTROPINE MESYLATE SCH MG: 1 TABLET ORAL at 07:20

## 2023-02-10 RX ADMIN — DIVALPROEX SODIUM SCH MG: 250 TABLET, DELAYED RELEASE ORAL at 07:21

## 2023-02-10 RX ADMIN — NICOTINE SCH PATCH: 14 PATCH, EXTENDED RELEASE TRANSDERMAL at 07:22

## 2023-02-10 RX ADMIN — DOCUSATE SODIUM SCH MG: 100 CAPSULE, LIQUID FILLED ORAL at 07:20

## 2023-02-10 NOTE — NUR
Nursing Progress Note: Mike      



Problem: Per 5150 pt. is here for SA by OD on street drugs after command hallucinations 
telling him to do so. Pt. states that he took "Fentanyl, black china, meth, 8 ball" in 
suicide attempt. Pt. states he feels hopeless. Pt. is also delusional, stating, "they won't 
give me my billion dollars". Pt. states, "I tried killing myself because Im a free renetta. 
They dont love me but I love them. Yes I do."



Interventions: 1:1 assessment, therapeutic conversation, active listening, ensured contract 
for safety, medication administration/education/monitoring, behavior monitoring and 
intervention as needed; reality orientation, distraction, redirection, limit setting, show 
of support, encouragement, positive reinforcement, and Q15 minute safety checks.



Response:  Pt was up before breakfast. Compliant with medication administration. Took AM 
medication as ordered. Headset offered and accepted; pacing unit. Pt expressed to this LVN 
during 1:1 SI/HI, denies AV/H. Speaking in linear clear manner. Endorses improving 
depressions/anxiety. This writer spoke with pt SW regarding dental appointments, SW states 
she will follow up. Pt has poor insight/judgement to current situation. Disheveled 
appearance with fair hygiene. Pt ate lunch in community room. Multiple snacks given 
throughout shift. Pt napped in bedroom after lunch. Pt became sad/tearful after lunch. Pt 
given PRN Tylenol, Ambesol, Ativan. Complying with behavioral contract. Pt in pleasant mood 
throughout shift, no inappropriate behaviors or emotional outbursts. 



Plan: Pt is conserved and awaiting placement.

## 2023-02-10 NOTE — NUR
Nursing Progress Note: Mike      



Problem: Per 5150 pt. is here for SA by OD on street drugs after command hallucinations 
telling him to do so. Pt. states that he took "Fentanyl, black china, meth, 8 ball" in 
suicide attempt. Pt. states he feels hopeless. Pt. is also delusional, stating, "they won't 
give me my billion dollars". Pt. states, "I tried killing myself because Im a free renetta. 
They dont love me but I love them. Yes I do."



Interventions: 1:1 assessment, therapeutic conversation, active listening, ensured contract 
for safety, medication administration/education/monitoring, behavior monitoring and 
intervention as needed; reality orientation, distraction, redirection, limit setting, show 
of support, encouragement, positive reinforcement, and Q15 minute safety checks.



Response:  Patient was received sleeping at beginning of shift. Patient continued to seep 
and self isolate through out shift. Patient had to be awaken to give night medications. 
Patient retuned to bed after agreeing to drink some water. 



Plan: Pt is conserved and awaiting placement.

## 2023-02-11 VITALS — SYSTOLIC BLOOD PRESSURE: 117 MMHG | DIASTOLIC BLOOD PRESSURE: 64 MMHG

## 2023-02-11 VITALS — SYSTOLIC BLOOD PRESSURE: 156 MMHG | DIASTOLIC BLOOD PRESSURE: 92 MMHG

## 2023-02-11 RX ADMIN — Medication SCH PACKET: at 19:40

## 2023-02-11 RX ADMIN — THERA TABS SCH EACH: TAB at 07:28

## 2023-02-11 RX ADMIN — Medication SCH MMU: at 07:28

## 2023-02-11 RX ADMIN — DIVALPROEX SODIUM SCH MG: 250 TABLET, DELAYED RELEASE ORAL at 07:29

## 2023-02-11 RX ADMIN — BENZTROPINE MESYLATE SCH MG: 1 TABLET ORAL at 07:30

## 2023-02-11 RX ADMIN — NICOTINE SCH PATCH: 14 PATCH, EXTENDED RELEASE TRANSDERMAL at 07:31

## 2023-02-11 RX ADMIN — BUPRENORPHINE AND NALOXONE SCH TAB: 2; .5 TABLET SUBLINGUAL at 07:30

## 2023-02-11 RX ADMIN — DOCUSATE SODIUM SCH MG: 100 CAPSULE, LIQUID FILLED ORAL at 07:30

## 2023-02-11 RX ADMIN — DIVALPROEX SODIUM SCH MG: 250 TABLET, DELAYED RELEASE ORAL at 19:38

## 2023-02-11 RX ADMIN — BENZTROPINE MESYLATE SCH MG: 1 TABLET ORAL at 19:38

## 2023-02-11 RX ADMIN — NICOTINE POLACRILEX PRN MG: 2 LOZENGE ORAL at 15:15

## 2023-02-11 RX ADMIN — DOCUSATE SODIUM SCH MG: 100 CAPSULE, LIQUID FILLED ORAL at 19:38

## 2023-02-11 RX ADMIN — BUPRENORPHINE AND NALOXONE SCH TAB: 2; .5 TABLET SUBLINGUAL at 15:14

## 2023-02-11 NOTE — NUR
Nursing Progress Note: Mike



Problem: 

Patient is 5150 status. Per admit note "[place] 
[place] of [place] yourself". During interview pt. endorses SI without a plan, as well as 
auditory hallucinations, states he hears random words like, "Chance". Pt. reports he had 
been living at [facility] for 4 months before he relapsed 2 days ago, drinking whiskey 
until he blacked out and snorting meth. Pt. reports he is now homeless. 



Interventions:

 Medication administration, 1:1 MH assessment, maintained a safe and supportive environment, 
 provided clear and simple instructions, provided encouragement regarding performance of 
ADLs, monitored behaviors and maintained clear boundaries, maintained Q15 minute safety 
checks.



Response:

Pt. received asleep, awoke for his medications, and was taken without hesitation. He denies 
SI, HI, AH, VH, and reports he is fasting. Pt. reports his last full meal was yesterday and 
was encouraged to eat recommended calories; he was compliant and ate subsequent meals and 
participated in snacks. Ed feeling good today but did c/o dental pain, receiving Tylenol 
X1.  He is currently on a reward system for good behavior and continues to participate. He 
ate meals in the main dining room with cohorts, without issues. He requires constant 
feedback and validation about his behavior, and reports Im working hard to do good He 
perseverates about his next dental appointment.  Later in the afternoon, pt. became agitated 
and raised his voice at this writer stating your evil, you think Im stupid, you want to 
keep giving me medications, Im not fucking doing it Pt. was advised to lower his voice and 
go to his room to collect himself, he was hesitant to comply, and security was called to do 
a walk through. Pt. did acknowledge securitys presence and did take the time to calm down, 
PRN Ativan and Haldol administered; pt took without hesitation. He later exited his room 
remorseful and apologized, within 10 min. pt. reported  I got issues with my roommate, he 
irritated Im coming in and out, I want a new room, Im trying to contain myself Writer spoke 
to roommate and he denied having or reporting any annoyance with roommate, a room change for 
roommate was made to prevent any escalation. No further incidents this shift. 

N.O for ABX to start per MD for dental issue.

Plan:

Pt requires stabilization with medication adjustment and management in a safe and 
therapeutic environment.








-------------------------------------------------------------------------------

Addendum: 02/11/23 at 1801 by Gisele Matt LVN

-------------------------------------------------------------------------------

Pt. presenting with increased agitation, approached writer stating " I want a shot" Writer 
advised pt. that was not available, he hovered at the door with intense eye contact stating 
"your evil, your making me suicidal" Writer advised pt. to back up from the door, as no 
other nurse was present. He appears to be fixated on this writer and has delusions about my 
role in his behavior. Security called for a 2nd walk through as a de escalation tool.

## 2023-02-11 NOTE — NUR
Nursing Progress Note: Mike      



Problem: Per 5150 pt. is here for SA by OD on street drugs after command hallucinations 
telling him to do so. Pt. states that he took "Fentanyl, black china, meth, 8 ball" in 
suicide attempt. Pt. states he feels hopeless. Pt. is also delusional, stating, "they won't 
give me my billion dollars". Pt. states, "I tried killing myself because Im a free renetta. 
They dont love me but I love them. Yes I do."



Interventions: 1:1 assessment, therapeutic conversation, active listening, ensured contract 
for safety, medication administration/education/monitoring, behavior monitoring and 
intervention as needed; reality orientation, distraction, redirection, limit setting, show 
of support, encouragement, positive reinforcement, and Q15 minute safety checks.



Response:  Patient was found sleeping in bed at change of shift. Patient continued to sleep 
and self isolate in room throughout shift. Patient had to be awaken to take night medication 
and went back to bed. Patient took all night medications without issue. 



Plan: Pt is conserved and awaiting placement.

## 2023-02-11 NOTE — NUR
Nursing Progress Note: Mike



Problem: 

Patient is 5150 status. Per admit note "[place] 
[place] of [place] yourself". During interview pt. endorses SI without a plan, as well as 
auditory hallucinations, states he hears random words like, "Chance". Pt. reports he had 
been living at [facility] for 4 months before he relapsed 2 days ago, drinking whiskey 
until he blacked out and snorting meth. Pt. reports he is now homeless. 



Interventions:

 Medication administration, 1:1 MH assessment, maintained a safe and supportive environment, 
 provided clear and simple instructions, provided encouragement regarding performance of 
ADLs, monitored behaviors and maintained clear boundaries, maintained Q15 minute safety 
checks.



Response:

Pt was pacing in the singh at change of shift. Walked and spoke with patient and he states "I 
just wanted to fast today, Christians have been fasting on Saturday for years, and she kept 
telling me to eat!" Pt states he is concerned about getting fat, and that he used to weigh 
400lbs and doesnt want to be that fat again. Pt is also upset that he didnt get an ensure. 
Pt states "that other doctor stopped my ensure and I need those, I need protein and I dont 
like to eat meat." Pt was able to calm himself but then began escalating again, pacing 
faster and crying. "Im just hopeless, Im never getting out of here! If that nurse is here 
tomorrow, Im just going to sleep all day. She called security on me for no reason!" Pt was 
asking for meds so HS meds were given. Pt continued to pace and then stated the meds helped 
then he requested prns to sleep and anbesol for his tooth. Pt declined to have snacks and 
went to bed. 



Plan:

Pt requires stabilization with medication adjustment and management in a safe and 
therapeutic environment.

## 2023-02-12 VITALS — SYSTOLIC BLOOD PRESSURE: 98 MMHG | DIASTOLIC BLOOD PRESSURE: 68 MMHG

## 2023-02-12 VITALS — SYSTOLIC BLOOD PRESSURE: 131 MMHG | DIASTOLIC BLOOD PRESSURE: 68 MMHG

## 2023-02-12 RX ADMIN — DIVALPROEX SODIUM SCH MG: 250 TABLET, DELAYED RELEASE ORAL at 07:48

## 2023-02-12 RX ADMIN — DOCUSATE SODIUM SCH MG: 100 CAPSULE, LIQUID FILLED ORAL at 21:28

## 2023-02-12 RX ADMIN — DIVALPROEX SODIUM SCH MG: 250 TABLET, DELAYED RELEASE ORAL at 21:28

## 2023-02-12 RX ADMIN — BENZTROPINE MESYLATE SCH MG: 1 TABLET ORAL at 07:49

## 2023-02-12 RX ADMIN — DOCUSATE SODIUM SCH MG: 100 CAPSULE, LIQUID FILLED ORAL at 07:47

## 2023-02-12 RX ADMIN — Medication SCH PACKET: at 21:29

## 2023-02-12 RX ADMIN — BENZTROPINE MESYLATE SCH MG: 1 TABLET ORAL at 21:27

## 2023-02-12 RX ADMIN — IBUPROFEN PRN MG: 400 TABLET, FILM COATED ORAL at 10:24

## 2023-02-12 RX ADMIN — THERA TABS SCH EACH: TAB at 07:48

## 2023-02-12 RX ADMIN — Medication SCH MMU: at 07:48

## 2023-02-12 RX ADMIN — BUPRENORPHINE AND NALOXONE SCH TAB: 2; .5 TABLET SUBLINGUAL at 15:28

## 2023-02-12 RX ADMIN — NICOTINE SCH PATCH: 14 PATCH, EXTENDED RELEASE TRANSDERMAL at 07:50

## 2023-02-12 RX ADMIN — BUPRENORPHINE AND NALOXONE SCH TAB: 2; .5 TABLET SUBLINGUAL at 07:48

## 2023-02-12 RX ADMIN — NICOTINE POLACRILEX PRN MG: 2 LOZENGE ORAL at 10:24

## 2023-02-12 NOTE — NUR
Nursing Progress Note      Mike



Problem: Per 5150 pt. is here for SA by OD on street drugs after command hallucinations 
telling him to do so. Pt. states that he took "Fentanyl, black ch[place], meth, 8 ball" in 
suicide attempt. Pt. states he feels hopeless. Pt. is also delusional, stating, "They won't 
give me my billion dollars". Pt. states, "I tried killing myself because Im a free renetta. 
They dont love me but I love them. Yes I do."



Interventions: Provide medication administration & medication management; Maintained a safe 
& supportive environment; Clear & simple instructions; Direction & encouragement  regarding 
performance of ADLs; monitored behaviors & maintained clear boundaries; Patient physical 
assessment & 1:1 patient interview; Therapeutic conversation & active listening; Patient 
education & monitoring.

 

Response: Received Pt in bed sleeping w/o distress at the beginning of the shift. Pt woke 
and was cooperative with vitals and engaged in assessments. Pt ate breakfast and lunch well, 
and was happy to see he lost weight when weekly weight done. Pt asked for many PRN meds 
today r/t tooth pain and anxiety. He isolated in room more than usual but did not have any 
angry outbursts on this shift. Pt communicating well and overall pleasant although he was 
not very excited about the football game.



Plan: Pt is conserved and awaiting long term placement.

## 2023-02-13 VITALS — DIASTOLIC BLOOD PRESSURE: 61 MMHG | SYSTOLIC BLOOD PRESSURE: 106 MMHG

## 2023-02-13 VITALS — SYSTOLIC BLOOD PRESSURE: 92 MMHG | DIASTOLIC BLOOD PRESSURE: 60 MMHG

## 2023-02-13 RX ADMIN — BUPRENORPHINE AND NALOXONE SCH TAB: 2; .5 TABLET SUBLINGUAL at 07:19

## 2023-02-13 RX ADMIN — DIVALPROEX SODIUM SCH MG: 250 TABLET, DELAYED RELEASE ORAL at 07:20

## 2023-02-13 RX ADMIN — THERA TABS SCH EACH: TAB at 07:19

## 2023-02-13 RX ADMIN — DOCUSATE SODIUM SCH MG: 100 CAPSULE, LIQUID FILLED ORAL at 07:19

## 2023-02-13 RX ADMIN — BENZTROPINE MESYLATE SCH MG: 1 TABLET ORAL at 20:50

## 2023-02-13 RX ADMIN — IBUPROFEN PRN MG: 400 TABLET, FILM COATED ORAL at 18:20

## 2023-02-13 RX ADMIN — DIVALPROEX SODIUM SCH MG: 250 TABLET, DELAYED RELEASE ORAL at 20:51

## 2023-02-13 RX ADMIN — Medication SCH MMU: at 07:20

## 2023-02-13 RX ADMIN — ALUMINUM HYDROXIDE, MAGNESIUM HYDROXIDE, AND SIMETHICONE PRN ML: 200; 200; 20 SUSPENSION ORAL at 09:14

## 2023-02-13 RX ADMIN — Medication SCH PACKET: at 20:50

## 2023-02-13 RX ADMIN — BUPRENORPHINE AND NALOXONE SCH TAB: 2; .5 TABLET SUBLINGUAL at 14:58

## 2023-02-13 RX ADMIN — BENZTROPINE MESYLATE SCH MG: 1 TABLET ORAL at 07:20

## 2023-02-13 RX ADMIN — NICOTINE POLACRILEX PRN MG: 2 LOZENGE ORAL at 08:25

## 2023-02-13 RX ADMIN — DOCUSATE SODIUM SCH MG: 100 CAPSULE, LIQUID FILLED ORAL at 20:50

## 2023-02-13 RX ADMIN — IBUPROFEN PRN MG: 400 TABLET, FILM COATED ORAL at 10:11

## 2023-02-13 RX ADMIN — NICOTINE SCH PATCH: 14 PATCH, EXTENDED RELEASE TRANSDERMAL at 07:19

## 2023-02-13 NOTE — NUR
DENTAL APPT 2/15/23- 9:30 AM



Mike has a dental appointment on 2/15/23 and will get picked up 9:30-10. Transport has 
been reminded that Mike is not allowed to have caffeine.



SIDDHARTHA Salamanca

## 2023-02-13 NOTE — NUR
F/u 2/13: Pt PO mostly ~100% meals w/ snacks meeting estimated needs. LBM 

2/11. No nutrition interventions at this time. Will continue to follow. 

Recommendations:

1) Continue Vegetarian diet per pt request; double eggs WB; cottage

cheese/peaches/salad WL; encourage PO intake. Liberalize to regular diet

if pt agreeable to assist meeting needs

2) MVI supplementation per MD

3) Bowel care PRN

4) Weekly scaled weights

-------------------------------------------------------------------------------

Addendum: 02/13/23 at 0856 by Jordan Godfrey RD

-------------------------------------------------------------------------------

Amended: Links added.

## 2023-02-13 NOTE — NUR
Nursing Progress Note      



Problem: Per 5150 pt. is here for SA by OD on street drugs after command hallucinations 
telling him to do so. Pt. states that he took "Fentanyl, black china, meth, 8 ball" in 
suicide attempt. Pt. states he feels hopeless. Pt. is also delusional, stating, "They won't 
give me my billion dollars". Pt. states, "I tried killing myself because Im a free renetta. 
They dont love me but I love them. Yes I do."



Interventions: Provide medication administration & medication management; Maintained a safe 
& supportive environment; Clear & simple instructions; Direction & encouragement  regarding 
performance of ADLs; monitored behaviors & maintained clear boundaries; Patient physical 
assessment & 1:1 patient interview; Therapeutic conversation & active listening; Patient 
education & monitoring.

 

Response:  Pt was walking in the singh at change of shift, and requested prn for pain. Pt 
took prn ibuprofen. Pt is in a pleasant mood, states he is doing good. Spent time pacing in 
the halls with peers before going to bed. Pt declined to have and evening snack, took HS 
meds and went to sleep.



Plan: Pt is conserved and awaiting placement.

## 2023-02-13 NOTE — NUR
Nursing Progress Note      Mike



Problem: Per 5150 pt. is here for SA by OD on street drugs after command hallucinations 
telling him to do so. Pt. states that he took "Fentanyl, black ch[place], meth, 8 ball" in 
suicide attempt. Pt. states he feels hopeless. Pt. is also delusional, stating, "They won't 
give me my billion dollars". Pt. states, "I tried killing myself because Im a free renetta. 
They dont love me but I love them. Yes I do."



Interventions: Provide medication administration & medication management; Maintained a safe 
& supportive environment; Clear & simple instructions; Direction & encouragement  regarding 
performance of ADLs; monitored behaviors & maintained clear boundaries; Patient physical 
assessment & 1:1 patient interview; Therapeutic conversation & active listening; Patient 
education & monitoring.

 

Response: Received Pt in bed sleeping at the beginning of the shift. Pt woke for assessments 
and HS meds. He was cooperative with vitals and HS meds and talked about who won the 
Superbowl. Pt denies SI/HI and A/VHs at this time and returned to sleep after using the 
bathroom.



Plan: Pt is conserved and awaiting long term placement. No

## 2023-02-13 NOTE — NUR
Nursing Progress Note      



Problem: Per 5150 pt. is here for SA by OD on street drugs after command hallucinations 
telling him to do so. Pt. states that he took "Fentanyl, black china, meth, 8 ball" in 
suicide attempt. Pt. states he feels hopeless. Pt. is also delusional, stating, "They won't 
give me my billion dollars". Pt. states, "I tried killing myself because Im a free rneetta. 
They dont love me but I love them. Yes I do."



Interventions: Provide medication administration & medication management; Maintained a safe 
& supportive environment; Clear & simple instructions; Direction & encouragement  regarding 
performance of ADLs; monitored behaviors & maintained clear boundaries; Patient physical 
assessment & 1:1 patient interview; Therapeutic conversation & active listening; Patient 
education & monitoring.

 

Response:  Received patient who was awake early at approximately 0720 and took his 0800 
medications at that time.  Patient reports he is feeling good and asked How many days have 
I been good?  Informed the patient I would look at his record and today it has been 19 days 
since patient had an uneventful day since his dental appointment.  Patient smiled and 
requested a chocolate from his [employer] (football that is located in the office in 
the cupboard).  Patient also received mozzarella sticks and jalapeno poppers for lunch.  
Patient participated in snacks/meals throughout the day.  Patient ambulated in the hallway 
for an extended period of time throughout the entire day and say he is feeling better with 
much more exercise.  Patient was smiling and talking with peers throughout the day and had a 
really great day. 



Plan: Pt is conserved and awaiting placement.

## 2023-02-14 VITALS — DIASTOLIC BLOOD PRESSURE: 66 MMHG | SYSTOLIC BLOOD PRESSURE: 105 MMHG

## 2023-02-14 VITALS — DIASTOLIC BLOOD PRESSURE: 71 MMHG | SYSTOLIC BLOOD PRESSURE: 112 MMHG

## 2023-02-14 RX ADMIN — THERA TABS SCH EACH: TAB at 07:25

## 2023-02-14 RX ADMIN — DIVALPROEX SODIUM SCH MG: 250 TABLET, DELAYED RELEASE ORAL at 20:05

## 2023-02-14 RX ADMIN — BENZTROPINE MESYLATE SCH MG: 1 TABLET ORAL at 07:26

## 2023-02-14 RX ADMIN — BUPRENORPHINE AND NALOXONE SCH TAB: 2; .5 TABLET SUBLINGUAL at 14:47

## 2023-02-14 RX ADMIN — BUPRENORPHINE AND NALOXONE SCH TAB: 2; .5 TABLET SUBLINGUAL at 07:27

## 2023-02-14 RX ADMIN — Medication SCH MMU: at 07:24

## 2023-02-14 RX ADMIN — NICOTINE POLACRILEX PRN MG: 2 LOZENGE ORAL at 07:58

## 2023-02-14 RX ADMIN — Medication SCH PACKET: at 20:06

## 2023-02-14 RX ADMIN — IBUPROFEN PRN MG: 400 TABLET, FILM COATED ORAL at 17:37

## 2023-02-14 RX ADMIN — DOCUSATE SODIUM SCH MG: 100 CAPSULE, LIQUID FILLED ORAL at 08:01

## 2023-02-14 RX ADMIN — NICOTINE POLACRILEX PRN MG: 2 LOZENGE ORAL at 15:56

## 2023-02-14 RX ADMIN — BENZOCAINE PRN APPLIC: 200 SOLUTION TOPICAL at 11:02

## 2023-02-14 RX ADMIN — NICOTINE SCH PATCH: 14 PATCH, EXTENDED RELEASE TRANSDERMAL at 07:27

## 2023-02-14 RX ADMIN — DIVALPROEX SODIUM SCH MG: 250 TABLET, DELAYED RELEASE ORAL at 07:25

## 2023-02-14 RX ADMIN — BENZTROPINE MESYLATE SCH MG: 1 TABLET ORAL at 20:06

## 2023-02-14 RX ADMIN — DOCUSATE SODIUM SCH MG: 100 CAPSULE, LIQUID FILLED ORAL at 20:06

## 2023-02-14 NOTE — NUR
Nursing Progress Note      



Problem: Per 5150 pt. is here for SA by OD on street drugs after command hallucinations 
telling him to do so. Pt. states that he took "Fentanyl, black china, meth, 8 ball" in 
suicide attempt. Pt. states he feels hopeless. Pt. is also delusional, stating, "They won't 
give me my billion dollars". Pt. states, "I tried killing myself because Im a free renetta. 
They dont love me but I love them. Yes I do."



Interventions: Provide medication administration & medication management; Maintained a safe 
& supportive environment; Clear & simple instructions; Direction & encouragement  regarding 
performance of ADLs; monitored behaviors & maintained clear boundaries; Patient physical 
assessment & 1:1 patient interview; Therapeutic conversation & active listening; Patient 
education & monitoring.

 

Response: Pt was asleep at change of shift. Pt woke for vitals and reports his mood is 
"good". Pt declines a snack tonight stating he wants to sleep because he has a dentist appt. 
Pt took HS meds and went back to sleep. Pt states he took off nicotine patch already



Plan: Pt is conserved and awaiting placement.

## 2023-02-14 NOTE — NUR
Nursing Progress Note      



Problem: Per 5150 pt. is here for SA by OD on street drugs after command hallucinations 
telling him to do so. Pt. states that he took "Fentanyl, black china, meth, 8 ball" in 
suicide attempt. Pt. states he feels hopeless. Pt. is also delusional, stating, "They won't 
give me my billion dollars". Pt. states, "I tried killing myself because Im a free renetta. 
They dont love me but I love them. Yes I do."



Interventions: Provide medication administration & medication management; Maintained a safe 
& supportive environment; Clear & simple instructions; Direction & encouragement  regarding 
performance of ADLs; monitored behaviors & maintained clear boundaries; Patient physical 
assessment & 1:1 patient interview; Therapeutic conversation & active listening; Patient 
education & monitoring.

 

Response:  Received patient sleeping in bed at change of shift.  Patient awakens at 07:20 
and requests his medications.  Patient reports that he is happy this morning.  Later in the 
morning, patient reports anxiety and requests prn Ativan which was given with moderate 
benefit.  Patient pacing the hallways most of the day until his ankle was hurting too much, 
then he went and laid down.  Patient is medication compliant.  Patient states that he is 
just tired of being here, but is proud of his behavior.  Patient went and laid down after 
dinner and said lucio.



Plan: Pt is conserved and awaiting placement.

## 2023-02-15 VITALS — DIASTOLIC BLOOD PRESSURE: 52 MMHG | SYSTOLIC BLOOD PRESSURE: 109 MMHG

## 2023-02-15 VITALS — SYSTOLIC BLOOD PRESSURE: 112 MMHG | DIASTOLIC BLOOD PRESSURE: 62 MMHG

## 2023-02-15 RX ADMIN — DOCUSATE SODIUM SCH MG: 100 CAPSULE, LIQUID FILLED ORAL at 07:36

## 2023-02-15 RX ADMIN — BUPRENORPHINE AND NALOXONE SCH TAB: 2; .5 TABLET SUBLINGUAL at 16:26

## 2023-02-15 RX ADMIN — NICOTINE POLACRILEX PRN MG: 2 LOZENGE ORAL at 08:23

## 2023-02-15 RX ADMIN — DOCUSATE SODIUM SCH MG: 100 CAPSULE, LIQUID FILLED ORAL at 20:33

## 2023-02-15 RX ADMIN — DIVALPROEX SODIUM SCH MG: 250 TABLET, DELAYED RELEASE ORAL at 07:38

## 2023-02-15 RX ADMIN — BUPRENORPHINE AND NALOXONE SCH TAB: 2; .5 TABLET SUBLINGUAL at 07:38

## 2023-02-15 RX ADMIN — THERA TABS SCH EACH: TAB at 07:35

## 2023-02-15 RX ADMIN — Medication SCH MMU: at 07:35

## 2023-02-15 RX ADMIN — Medication SCH PACKET: at 20:33

## 2023-02-15 RX ADMIN — BENZTROPINE MESYLATE SCH MG: 1 TABLET ORAL at 20:34

## 2023-02-15 RX ADMIN — BENZTROPINE MESYLATE SCH MG: 1 TABLET ORAL at 07:36

## 2023-02-15 RX ADMIN — NICOTINE SCH PATCH: 14 PATCH, EXTENDED RELEASE TRANSDERMAL at 07:35

## 2023-02-15 RX ADMIN — DIVALPROEX SODIUM SCH MG: 250 TABLET, DELAYED RELEASE ORAL at 20:34

## 2023-02-15 NOTE — NUR
Nursing Progress Note      Mike



Problem: Per 5150 pt. is here for SA by OD on street drugs after command hallucinations 
telling him to do so. Pt. states that he took "Fentanyl, black china, meth, 8 ball" in 
suicide attempt. Pt. states he feels hopeless. Pt. is also delusional, stating, "They won't 
give me my billion dollars". Pt. states, "I tried killing myself because Im a free renetta. 
They dont love me but I love them. Yes I do."



Interventions: Provide medication administration & medication management; Maintained a safe 
& supportive environment; Clear & simple instructions; Direction & encouragement  regarding 
performance of ADLs; monitored behaviors & maintained clear boundaries; Patient physical 
assessment & 1:1 patient interview; Therapeutic conversation & active listening; Patient 
education & monitoring.

 

Response: Received Pt in bed sleeping w/o distress at the beginning of the shift. Pt woke 
and was cooperative with vitals and engaged in assessments. Pt ate breakfast and took AM 
meds w/o issue. Pt picked up and transported to dental Appt. in [place]. Pt returned in 
good mood and took scheduled meds and received snacks as part of behavioral plan. Pt became 
anxious and received Ativan PRN as well with good effect. Pt reported not drinking coffee 
while out and did not have any angry outbursts upon return. 



Plan: Pt is conserved and awaiting long term placement.

## 2023-02-16 VITALS — DIASTOLIC BLOOD PRESSURE: 60 MMHG | SYSTOLIC BLOOD PRESSURE: 102 MMHG

## 2023-02-16 RX ADMIN — BENZTROPINE MESYLATE SCH MG: 1 TABLET ORAL at 07:45

## 2023-02-16 RX ADMIN — Medication SCH PACKET: at 20:55

## 2023-02-16 RX ADMIN — THERA TABS SCH EACH: TAB at 07:45

## 2023-02-16 RX ADMIN — DOCUSATE SODIUM SCH MG: 100 CAPSULE, LIQUID FILLED ORAL at 07:45

## 2023-02-16 RX ADMIN — BENZTROPINE MESYLATE SCH MG: 1 TABLET ORAL at 20:56

## 2023-02-16 RX ADMIN — BUPRENORPHINE AND NALOXONE SCH TAB: 2; .5 TABLET SUBLINGUAL at 07:43

## 2023-02-16 RX ADMIN — Medication SCH MMU: at 07:45

## 2023-02-16 RX ADMIN — DIVALPROEX SODIUM SCH MG: 250 TABLET, DELAYED RELEASE ORAL at 07:43

## 2023-02-16 RX ADMIN — NICOTINE POLACRILEX PRN MG: 2 LOZENGE ORAL at 15:55

## 2023-02-16 RX ADMIN — NICOTINE SCH PATCH: 14 PATCH, EXTENDED RELEASE TRANSDERMAL at 08:06

## 2023-02-16 RX ADMIN — DIVALPROEX SODIUM SCH MG: 250 TABLET, DELAYED RELEASE ORAL at 20:56

## 2023-02-16 RX ADMIN — DOCUSATE SODIUM SCH MG: 100 CAPSULE, LIQUID FILLED ORAL at 20:55

## 2023-02-16 RX ADMIN — BUPRENORPHINE AND NALOXONE SCH TAB: 2; .5 TABLET SUBLINGUAL at 20:55

## 2023-02-16 RX ADMIN — BUPRENORPHINE AND NALOXONE SCH TAB: 2; .5 TABLET SUBLINGUAL at 15:00

## 2023-02-16 NOTE — NUR
Nursing Progress Note     



Problem: Per 5150 pt. is here for SA by OD on street drugs after command hallucinations 
telling him to do so. Pt. states that he took "Fentanyl, black china, meth, 8 ball" in 
suicide attempt. Pt. states he feels hopeless. Pt. is also delusional, stating, "They won't 
give me my billion dollars". Pt. states, "I tried killing myself because Im a free renetta. 
They dont love me but I love them. Yes I do."



Interventions: Provided 1:1 assessment with therapeutic communication and active listening; 
Provided  medication administration/education/monitoring; Provided clear & simple 
instructions; Provide encouragement and education regarding performance of ADLs; Monitored 
behaviors & maintained clear boundaries; PRN Ativan given for anxiety. Monitored [id number] 
safety checks. 

 

Response: Pt c/o of tooth pain, sensitive to cold and gums bleeding when he brushes his 
teeth; He endorses regular BM and that "drinking milk helps." Pt stops at the nurses station 
frequently to share his thoughts and feelings throughout the shift. Pt is discouraged over 
being here for the past 5 months. Good effect with PRN Ativan 2mg. Pt came up and reports he 
is feeling better thanking this nurse for the medicine. 



Plan: Pt is conserved and awaiting long term placement.

## 2023-02-16 NOTE — NUR
Nursing Progress Note      Mike



Problem: Per 5150 pt. is here for SA by OD on street drugs after command hallucinations 
telling him to do so. Pt. states that he took "Fentanyl, black china, meth, 8 ball" in 
suicide attempt. Pt. states he feels hopeless. Pt. is also delusional, stating, "They won't 
give me my billion dollars". Pt. states, "I tried killing myself because Im a free renetta. 
They dont love me but I love them. Yes I do."



Interventions: Provide medication administration & medication management; Maintained a safe 
& supportive environment; Clear & simple instructions; Direction & encouragement  regarding 
performance of ADLs; monitored behaviors & maintained clear boundaries; Patient physical 
assessment & 1:1 patient interview; Therapeutic conversation & active listening; Patient 
education & monitoring.

 

Response: Received Pt in his room resting and tired from stress of dental apt. today. Pt 
reported wishing he was free on the outside. He was cooperative with vitals and 
assessments and took HS meds w/o issue. Pt denies SI/HI and minimal AHs, and had no violent 
or angry outbursts this shift. He went to bed early and remained asleep through the night.



Plan: Pt is conserved and awaiting long term placement.

## 2023-02-17 VITALS — DIASTOLIC BLOOD PRESSURE: 79 MMHG | SYSTOLIC BLOOD PRESSURE: 115 MMHG

## 2023-02-17 VITALS — DIASTOLIC BLOOD PRESSURE: 59 MMHG | SYSTOLIC BLOOD PRESSURE: 122 MMHG

## 2023-02-17 RX ADMIN — BUPRENORPHINE AND NALOXONE SCH TAB: 2; .5 TABLET SUBLINGUAL at 15:08

## 2023-02-17 RX ADMIN — DIVALPROEX SODIUM SCH MG: 250 TABLET, DELAYED RELEASE ORAL at 07:04

## 2023-02-17 RX ADMIN — IBUPROFEN PRN MG: 400 TABLET, FILM COATED ORAL at 11:58

## 2023-02-17 RX ADMIN — BENZTROPINE MESYLATE SCH MG: 1 TABLET ORAL at 07:04

## 2023-02-17 RX ADMIN — NICOTINE SCH PATCH: 14 PATCH, EXTENDED RELEASE TRANSDERMAL at 07:03

## 2023-02-17 RX ADMIN — BUPRENORPHINE AND NALOXONE SCH TAB: 2; .5 TABLET SUBLINGUAL at 20:36

## 2023-02-17 RX ADMIN — BUPRENORPHINE AND NALOXONE SCH TAB: 2; .5 TABLET SUBLINGUAL at 07:05

## 2023-02-17 RX ADMIN — Medication SCH PACKET: at 20:36

## 2023-02-17 RX ADMIN — THERA TABS SCH EACH: TAB at 07:05

## 2023-02-17 RX ADMIN — BENZTROPINE MESYLATE SCH MG: 1 TABLET ORAL at 20:36

## 2023-02-17 RX ADMIN — DOCUSATE SODIUM SCH MG: 100 CAPSULE, LIQUID FILLED ORAL at 20:36

## 2023-02-17 RX ADMIN — DOCUSATE SODIUM SCH MG: 100 CAPSULE, LIQUID FILLED ORAL at 07:05

## 2023-02-17 RX ADMIN — Medication SCH MMU: at 07:05

## 2023-02-17 RX ADMIN — DIVALPROEX SODIUM SCH MG: 250 TABLET, DELAYED RELEASE ORAL at 20:36

## 2023-02-17 NOTE — NUR
Nursing Progress Note     



Problem: Per 5150 pt. is here for SA by OD on street drugs after command hallucinations 
telling him to do so. Pt. states that he took "Fentanyl, black china, meth, 8 ball" in 
suicide attempt. Pt. states he feels hopeless. Pt. is also delusional, stating, "They won't 
give me my billion dollars". Pt. states, "I tried killing myself because Im a free renetta. 
They dont love me but I love them. Yes I do."



Interventions: Provided 1:1 assessment with therapeutic communication and active listening; 
Provided  medication administration/education/monitoring; Provided clear & simple 
instructions; Provide encouragement and education regarding performance of ADLs; Monitored 
behaviors & maintained clear boundaries; PRN Ativan given for anxiety. Monitored [id number] 
safety checks. 

 

Response: Patient was up early for breakfast. He came out of his room and started walking. 
Patient walks all day, even when his legs hurt. He ate very little of his breakfast and was 
compliant with medications. Patient has a goal of losing weight, and it seems as if its his 
only goal. He continues to show his discouragement at still being here every day with staff 
and his provider. Patient is on a behavior plan now. If he would make following the plan a 
major goal, instead of losing weight, he would probably have a better chance of getting 
placed sooner. Patient continues to report significant mouth pain related to a cavity that 
needs to be filled.



Plan: Pt is conserved and awaiting long term placement.

## 2023-02-17 NOTE — NUR
Nursing Progress Note     Mike



Problem: Per 5150 pt. is here for SA by OD on street drugs after command hallucinations 
telling him to do so. Pt. states that he took "Fentanyl, black china, meth, 8 ball" in 
suicide attempt. Pt. states he feels hopeless. Pt. is also delusional, stating, "They won't 
give me my billion dollars". Pt. states, "I tried killing myself because Im a free renetta. 
They dont love me but I love them. Yes I do."



Interventions: Provided 1:1 assessment with therapeutic communication and active listening; 
Provided  medication administration/education/monitoring; Provided clear & simple 
instructions; Provide encouragement and education regarding performance of ADLs; Monitored 
behaviors & maintained clear boundaries; PRN Ativan given for anxiety. Monitored [id number] 
safety checks. 

 

Response: Received pt sleeping in bed.  Woke pt up for HS medications which he accepted.  Pt 
states he is tired and had no complaints.  Pt did not want to converse with this RN.  Pt 
went back to sleep and will continue to monitor. 



Plan: Pt is conserved and awaiting long term placement.

## 2023-02-18 VITALS — SYSTOLIC BLOOD PRESSURE: 116 MMHG | DIASTOLIC BLOOD PRESSURE: 61 MMHG

## 2023-02-18 VITALS — DIASTOLIC BLOOD PRESSURE: 52 MMHG | SYSTOLIC BLOOD PRESSURE: 92 MMHG

## 2023-02-18 RX ADMIN — BUPRENORPHINE AND NALOXONE SCH TAB: 2; .5 TABLET SUBLINGUAL at 21:01

## 2023-02-18 RX ADMIN — DIVALPROEX SODIUM SCH MG: 250 TABLET, DELAYED RELEASE ORAL at 07:15

## 2023-02-18 RX ADMIN — DOCUSATE SODIUM SCH MG: 100 CAPSULE, LIQUID FILLED ORAL at 07:15

## 2023-02-18 RX ADMIN — IBUPROFEN PRN MG: 400 TABLET, FILM COATED ORAL at 07:14

## 2023-02-18 RX ADMIN — NICOTINE SCH PATCH: 14 PATCH, EXTENDED RELEASE TRANSDERMAL at 08:38

## 2023-02-18 RX ADMIN — THERA TABS SCH EACH: TAB at 07:16

## 2023-02-18 RX ADMIN — DIVALPROEX SODIUM SCH MG: 250 TABLET, DELAYED RELEASE ORAL at 21:01

## 2023-02-18 RX ADMIN — BUPRENORPHINE AND NALOXONE SCH TAB: 2; .5 TABLET SUBLINGUAL at 14:41

## 2023-02-18 RX ADMIN — BENZTROPINE MESYLATE SCH MG: 1 TABLET ORAL at 07:15

## 2023-02-18 RX ADMIN — Medication SCH PACKET: at 21:04

## 2023-02-18 RX ADMIN — BUPRENORPHINE AND NALOXONE SCH TAB: 2; .5 TABLET SUBLINGUAL at 07:16

## 2023-02-18 RX ADMIN — IBUPROFEN PRN MG: 400 TABLET, FILM COATED ORAL at 14:56

## 2023-02-18 RX ADMIN — Medication SCH MMU: at 07:14

## 2023-02-18 RX ADMIN — DOCUSATE SODIUM SCH MG: 100 CAPSULE, LIQUID FILLED ORAL at 21:02

## 2023-02-18 RX ADMIN — BENZTROPINE MESYLATE SCH MG: 1 TABLET ORAL at 21:01

## 2023-02-18 NOTE — NUR
Nursing Progress Note   Mike  



Problem: Per 5150 pt. is here for SA by OD on street drugs after command hallucinations 
telling him to do so. Pt. states that he took "Fentanyl, black china, meth, 8 ball" in 
suicide attempt. Pt. states he feels hopeless. Pt. is also delusional, stating, "They won't 
give me my billion dollars". Pt. states, "I tried killing myself because Im a free renetta. 
They dont love me but I love them. Yes I do."



Interventions: Provided 1:1 assessment with therapeutic communication and active listening; 
Provided  medication administration/education/monitoring; Provided clear & simple 
instructions; Provide encouragement and education regarding performance of ADLs; Monitored 
behaviors & maintained clear boundaries; PRN Ativan given for anxiety. Monitored [id number] 
safety checks. 

 

Response: Patient sleeping at change of shift.  Woke pt up for HS medications, the patient 
states he is doing OK and is sleepy.  The pt got up to use the bathroom and went back to 
sleep, pt isolated all evening in his room.  

Plan: Pt is conserved and awaiting long term placement.

## 2023-02-19 VITALS — DIASTOLIC BLOOD PRESSURE: 54 MMHG | SYSTOLIC BLOOD PRESSURE: 98 MMHG

## 2023-02-19 VITALS — DIASTOLIC BLOOD PRESSURE: 91 MMHG | SYSTOLIC BLOOD PRESSURE: 141 MMHG

## 2023-02-19 RX ADMIN — BENZOCAINE PRN APPLIC: 200 SOLUTION TOPICAL at 03:56

## 2023-02-19 RX ADMIN — BENZTROPINE MESYLATE SCH MG: 1 TABLET ORAL at 07:26

## 2023-02-19 RX ADMIN — BENZTROPINE MESYLATE SCH MG: 1 TABLET ORAL at 20:18

## 2023-02-19 RX ADMIN — DOCUSATE SODIUM SCH MG: 100 CAPSULE, LIQUID FILLED ORAL at 07:26

## 2023-02-19 RX ADMIN — Medication SCH MMU: at 07:26

## 2023-02-19 RX ADMIN — Medication SCH PACKET: at 20:18

## 2023-02-19 RX ADMIN — BUPRENORPHINE AND NALOXONE SCH TAB: 2; .5 TABLET SUBLINGUAL at 07:27

## 2023-02-19 RX ADMIN — BUPRENORPHINE AND NALOXONE SCH TAB: 2; .5 TABLET SUBLINGUAL at 20:18

## 2023-02-19 RX ADMIN — IBUPROFEN PRN MG: 400 TABLET, FILM COATED ORAL at 03:56

## 2023-02-19 RX ADMIN — NICOTINE SCH PATCH: 14 PATCH, EXTENDED RELEASE TRANSDERMAL at 07:27

## 2023-02-19 RX ADMIN — BUPRENORPHINE AND NALOXONE SCH TAB: 2; .5 TABLET SUBLINGUAL at 15:36

## 2023-02-19 RX ADMIN — DOCUSATE SODIUM SCH MG: 100 CAPSULE, LIQUID FILLED ORAL at 20:18

## 2023-02-19 RX ADMIN — DIVALPROEX SODIUM SCH MG: 250 TABLET, DELAYED RELEASE ORAL at 07:26

## 2023-02-19 RX ADMIN — THERA TABS SCH EACH: TAB at 07:26

## 2023-02-19 RX ADMIN — IBUPROFEN PRN MG: 400 TABLET, FILM COATED ORAL at 09:07

## 2023-02-19 RX ADMIN — DIVALPROEX SODIUM SCH MG: 250 TABLET, DELAYED RELEASE ORAL at 20:18

## 2023-02-19 NOTE — NUR
Nursing Progress Note      



Problem: Per 5150 pt. is here for SA by OD on street drugs after command hallucinations 
telling him to do so. Pt. states that he took "Fentanyl, black china, meth, 8 ball" in 
suicide attempt. Pt. states he feels hopeless. Pt. is also delusional, stating, "They won't 
give me my billion dollars". Pt. states, "I tried killing myself because Im a free renetta. 
They dont love me but I love them. Yes I do."



Interventions: Provide medication administration & medication management; Maintained a safe 
& supportive environment; Clear & simple instructions; Direction & encouragement  regarding 
performance of ADLs; monitored behaviors & maintained clear boundaries; Patient physical 
assessment & 1:1 patient interview; Therapeutic conversation & active listening; Patient 
education & monitoring.

 

Response:  Pt returned to his room after dinner to nap. Pt awaken for HS meds and he took 
his meds. Denies any pain and just wants to sleep. Pt declined a snack. Pt states his day 
was good. Pt took HS meds and went back to sleep. 



Plan: Pt is conserved and awaiting placement.

## 2023-02-19 NOTE — NUR
Nursing Progress Note      



Problem: Per 5150 pt. is here for SA by OD on street drugs after command hallucinations 
telling him to do so. Pt. states that he took "Fentanyl, black china, meth, 8 ball" in 
suicide attempt. Pt. states he feels hopeless. Pt. is also delusional, stating, "They won't 
give me my billion dollars". Pt. states, "I tried killing myself because Im a free renetta. 
They dont love me but I love them. Yes I do."



Interventions: Provide medication administration & medication management; Maintained a safe 
& supportive environment; Clear & simple instructions; Direction & encouragement  regarding 
performance of ADLs; monitored behaviors & maintained clear boundaries; Patient physical 
assessment & 1:1 patient interview; Therapeutic conversation & active listening; Patient 
education & monitoring.

 

Response:  Patient reports Shauna been up since 4 am with tooth pain.  Patient was 
ambulating in the hallway and greeted by this Writer at 0625.  Patient is pleasant and 
smiling at this time.  Patient received his morning medications at 0720.  Patient states 
Im feeling good, and its going to be a good day.  Patient ambulated in the hallway most 
of the morning, then started to have a conversation with one of the staff members about 
[employer].  Patient started to make slightly delusional statements regarding his support 
for the [employer], when he was asked by another Staff Member to not continue to converse 
about this matter.  Patient was immediately apologetic and stopped talking about the [employer] 
[employer] and walked away from the door.  Patient returned to the door and apologized that he 
had said anything about the [employer], and the apology was accepted by the Staff Member, 
and patient started crying.  This writer gave the patient a hug and informed him he didnt 
do anything wrong but sometimes what we say is not everyones cup of tea, but reinforced 
that he had done nothing wrong.  Patient retreated to his bed and relaxed under lunchtime.  
Patient had requested a burrito this morning, for lunch, and patient ate it in the Community 
Room for lunch.  Patient then reported to another nurse, in my absence, that he had vomited 
after lunch x1.  Patient immediately fell back to sleep in his room, then woke up at 1545 
and took his ordered medications for 1500 & 1600.  Patient stated he felt better but again 
c/o tooth pain and was given Tylenol 975 mg po for tooth pain rated at 7 on a scale of 
1-10.  Patient rested in bed for short periods of time from 1600 to 1800 and then went to 
the Community Room for dinner.



Plan: Pt is conserved and awaiting placement.

## 2023-02-19 NOTE — NUR
Nursing Progress Note     



Problem: Per 5150 pt. is here for SA by OD on street drugs after command hallucinations 
telling him to do so. Pt. states that he took "Fentanyl, black china, meth, 8 ball" in 
suicide attempt. Pt. states he feels hopeless. Pt. is also delusional, stating, "They won't 
give me my billion dollars". Pt. states, "I tried killing myself because Im a free renetta. 
They dont love me but I love them. Yes I do."



Interventions: Provided 1:1 assessment with therapeutic communication and active listening; 
Provided  medication administration/education/monitoring; Provided clear & simple 
instructions; Provide encouragement and education regarding performance of ADLs; Monitored 
behaviors & maintained clear boundaries; PRN Ativan given for anxiety. Monitored [id number] 
safety checks. 

 

Response:  Patient self isolated in his room this shift. He primarily slept. Patient tells 
this writer that he is feeling ok. He denies S/I, H/I, or any hallucinations. Patient is 
medications compliant. 





Plan: Pt is conserved and awaiting long term placement.

## 2023-02-20 VITALS — SYSTOLIC BLOOD PRESSURE: 103 MMHG | DIASTOLIC BLOOD PRESSURE: 61 MMHG

## 2023-02-20 VITALS — SYSTOLIC BLOOD PRESSURE: 102 MMHG | DIASTOLIC BLOOD PRESSURE: 52 MMHG

## 2023-02-20 RX ADMIN — IBUPROFEN PRN MG: 400 TABLET, FILM COATED ORAL at 11:59

## 2023-02-20 RX ADMIN — Medication SCH PACKET: at 20:11

## 2023-02-20 RX ADMIN — DIVALPROEX SODIUM SCH MG: 250 TABLET, DELAYED RELEASE ORAL at 08:15

## 2023-02-20 RX ADMIN — Medication SCH MMU: at 08:15

## 2023-02-20 RX ADMIN — THERA TABS SCH EACH: TAB at 08:14

## 2023-02-20 RX ADMIN — BENZTROPINE MESYLATE SCH MG: 1 TABLET ORAL at 20:11

## 2023-02-20 RX ADMIN — BUPRENORPHINE AND NALOXONE SCH TAB: 2; .5 TABLET SUBLINGUAL at 16:44

## 2023-02-20 RX ADMIN — DIVALPROEX SODIUM SCH MG: 250 TABLET, DELAYED RELEASE ORAL at 20:10

## 2023-02-20 RX ADMIN — IBUPROFEN PRN MG: 400 TABLET, FILM COATED ORAL at 08:11

## 2023-02-20 RX ADMIN — DOCUSATE SODIUM SCH MG: 100 CAPSULE, LIQUID FILLED ORAL at 08:15

## 2023-02-20 RX ADMIN — BUPRENORPHINE AND NALOXONE SCH TAB: 2; .5 TABLET SUBLINGUAL at 08:16

## 2023-02-20 RX ADMIN — ALUMINUM HYDROXIDE, MAGNESIUM HYDROXIDE, AND SIMETHICONE PRN ML: 200; 200; 20 SUSPENSION ORAL at 17:09

## 2023-02-20 RX ADMIN — BENZTROPINE MESYLATE SCH MG: 1 TABLET ORAL at 08:15

## 2023-02-20 RX ADMIN — DOCUSATE SODIUM SCH MG: 100 CAPSULE, LIQUID FILLED ORAL at 20:11

## 2023-02-20 RX ADMIN — BUPRENORPHINE AND NALOXONE SCH TAB: 2; .5 TABLET SUBLINGUAL at 20:11

## 2023-02-20 RX ADMIN — BENZOCAINE PRN APPLIC: 200 SOLUTION TOPICAL at 11:24

## 2023-02-20 RX ADMIN — NICOTINE SCH PATCH: 14 PATCH, EXTENDED RELEASE TRANSDERMAL at 08:14

## 2023-02-20 NOTE — NUR
Nursing Progress Note      



Problem: Per 5150 pt. is here for SA by OD on street drugs after command hallucinations 
telling him to do so. Pt. states that he took "Fentanyl, black china, meth, 8 ball" in 
suicide attempt. Pt. states he feels hopeless. Pt. is also delusional, stating, "They won't 
give me my billion dollars". Pt. states, "I tried killing myself because Im a free renetta. 
They dont love me but I love them. Yes I do."



Interventions: Provide medication administration & medication management; Maintained a safe 
& supportive environment; Clear & simple instructions; Direction & encouragement  regarding 
performance of ADLs; monitored behaviors & maintained clear boundaries; Patient physical 
assessment & 1:1 patient interview; Therapeutic conversation & active listening; Patient 
education & monitoring.

 

Response:  Pt was in bed at change of shift sleeping. Pt states he is feeling ok and denies 
any stomach ache. Pt denies s/i, denies a/vh. Pt states "Im just tired." Pt declines a snack 
and took HS meds. pt went back to sleep.

Plan: Pt is conserved and awaiting placement.

## 2023-02-20 NOTE — NUR
Nursing Progress Note      



Problem: Per 5150 pt. is here for SA by OD on street drugs after command hallucinations 
telling him to do so. Pt. states that he took "Fentanyl, black china, meth, 8 ball" in 
suicide attempt. Pt. states he feels hopeless. Pt. is also delusional, stating, "They won't 
give me my billion dollars". Pt. states, "I tried killing myself because Im a free renetta. 
They dont love me but I love them. Yes I do."



Interventions: Provide medication administration & medication management; Maintained a safe 
& supportive environment; Clear & simple instructions; Direction & encouragement  regarding 
performance of ADLs; monitored behaviors & maintained clear boundaries; Patient physical 
assessment & 1:1 patient interview; Therapeutic conversation & active listening; Patient 
education & monitoring.

 

Response:  Received patient at approximately 0815 when he woke up for breakfast.  Patient 
immediately stated I dont want to eat breakfast today.  Patient took his morning 
medications and returned to his room for approximately 2 hours.  Patient came out at 10:00 
and walked multiple laps in the hallway with another peer.  Patient reported My stomach 
feels sick.  Encouraged the patient to eat at snack time and drink lots of fluids today so 
he doesnt feel much worse.  Patient denied nausea.  Patient returned to sleep in his bed, 
refusing lunch, from 1300 to 1650.  Patient took his afternoon medications then said I need 
to drink some fluids, Im feeling run down so Im going to eat my dinner and ambulate in the 
hallway until bedtime.  Patient appears to be feeling more upbeat and much less subdued and 
appearing less depressed than earlier today.  Patient smiled when he was informed that Jose Juan 
from [employer] [facility] stopped by to see him about 30 minutes prior to him waking up.  
Patient had a great big smile and said I sure love him.  Will continue to help patient 
maintain a positive outlook with reinforcing his many days of great behavior. Patient 
continued at approximately 1715 to c/o "stomach ache," and he was given Maalox at this time. 
 Will continue to monitor.



Plan: Pt is conserved and awaiting placement.

## 2023-02-21 VITALS — DIASTOLIC BLOOD PRESSURE: 69 MMHG | SYSTOLIC BLOOD PRESSURE: 105 MMHG

## 2023-02-21 VITALS — DIASTOLIC BLOOD PRESSURE: 68 MMHG | SYSTOLIC BLOOD PRESSURE: 128 MMHG

## 2023-02-21 RX ADMIN — BENZOCAINE PRN APPLIC: 200 SOLUTION TOPICAL at 10:58

## 2023-02-21 RX ADMIN — DOCUSATE SODIUM SCH MG: 100 CAPSULE, LIQUID FILLED ORAL at 07:19

## 2023-02-21 RX ADMIN — BENZTROPINE MESYLATE SCH MG: 1 TABLET ORAL at 07:20

## 2023-02-21 RX ADMIN — NICOTINE SCH PATCH: 14 PATCH, EXTENDED RELEASE TRANSDERMAL at 07:21

## 2023-02-21 RX ADMIN — BENZTROPINE MESYLATE SCH MG: 1 TABLET ORAL at 20:58

## 2023-02-21 RX ADMIN — THERA TABS SCH EACH: TAB at 07:20

## 2023-02-21 RX ADMIN — BUPRENORPHINE AND NALOXONE SCH TAB: 2; .5 TABLET SUBLINGUAL at 20:58

## 2023-02-21 RX ADMIN — NICOTINE POLACRILEX PRN MG: 2 LOZENGE ORAL at 08:30

## 2023-02-21 RX ADMIN — BUPRENORPHINE AND NALOXONE SCH TAB: 2; .5 TABLET SUBLINGUAL at 14:30

## 2023-02-21 RX ADMIN — Medication SCH PACKET: at 20:58

## 2023-02-21 RX ADMIN — BUPRENORPHINE AND NALOXONE SCH TAB: 2; .5 TABLET SUBLINGUAL at 07:19

## 2023-02-21 RX ADMIN — Medication SCH MMU: at 07:20

## 2023-02-21 RX ADMIN — DIVALPROEX SODIUM SCH MG: 250 TABLET, DELAYED RELEASE ORAL at 20:57

## 2023-02-21 RX ADMIN — DIVALPROEX SODIUM SCH MG: 250 TABLET, DELAYED RELEASE ORAL at 07:19

## 2023-02-21 RX ADMIN — DOCUSATE SODIUM SCH MG: 100 CAPSULE, LIQUID FILLED ORAL at 20:57

## 2023-02-21 NOTE — NUR
Nursing Progress Note      



Problem: Per 5150 pt. is here for SA by OD on street drugs after command hallucinations 
telling him to do so. Pt. states that he took "Fentanyl, black china, meth, 8 ball" in 
suicide attempt. Pt. states he feels hopeless. Pt. is also delusional, stating, "They won't 
give me my billion dollars". Pt. states, "I tried killing myself because Im a free renetta. 
They dont love me but I love them. Yes I do."



Interventions: Provide medication administration & medication management; Maintained a safe 
& supportive environment; Clear & simple instructions; Direction & encouragement  regarding 
performance of ADLs; monitored behaviors & maintained clear boundaries; Patient physical 
assessment & 1:1 patient interview; Therapeutic conversation & active listening; Patient 
education & monitoring.

 

Response: Pt awoke at 0645 requesting a shower. Pt then requested his morning medications at 
0720 in hopes his Suboxone would help decrease his tooth pain. Pt later requested his PRN 
Tylenol at 0830 and anbesol at 11am for tooth pain. Pt stated the anbesol worked really 
well. 1:1 done at bedside. At approx. 1135AM pt. received a phone call from his cousin 
informing Mike his cousin passed away from a fentanyl overdose. Mike became upset, 
crying and shouting. PRN Haldol and Ativan administered at this time. Mike has been 
pacing the halls, calmly and appropriately. PRN Tylenol administered once again at 1430 for 
tooth pain. Pt resting now in his room on his bed.  





Plan: Pt is conserved and awaiting placement.

## 2023-02-22 VITALS — SYSTOLIC BLOOD PRESSURE: 94 MMHG | DIASTOLIC BLOOD PRESSURE: 50 MMHG

## 2023-02-22 VITALS — DIASTOLIC BLOOD PRESSURE: 47 MMHG | SYSTOLIC BLOOD PRESSURE: 100 MMHG

## 2023-02-22 LAB
ALBUMIN SERPL BCP-MCNC: 3.8 G/DL (ref 3.4–5)
ALBUMIN/GLOB SERPL: 1.2 {RATIO} (ref 1.1–1.5)
ALP SERPL-CCNC: 90 IU/L (ref 46–116)
ALT SERPL W P-5'-P-CCNC: 26 U/L (ref 12–78)
ANION GAP SERPL CALCULATED.3IONS-SCNC: 4 MMOL/L (ref 8–16)
AST SERPL W P-5'-P-CCNC: 23 U/L (ref 10–37)
BASOPHILS # BLD AUTO: 0 X10'3 (ref 0–0.2)
BASOPHILS NFR BLD AUTO: 0.8 % (ref 0–1)
BILIRUB SERPL-MCNC: 0.5 MG/DL (ref 0.1–1)
BUN SERPL-MCNC: 11 MG/DL (ref 7–18)
BUN/CREAT SERPL: 12.8 (ref 5.4–32)
CALCIUM SERPL-MCNC: 8.9 MG/DL (ref 8.5–10.1)
CHLORIDE SERPL-SCNC: 105 MMOL/L (ref 99–107)
CO2 SERPL-SCNC: 31.2 MMOL/L (ref 24–32)
CREAT SERPL-MCNC: 0.86 MG/DL (ref 0.6–1.1)
EOSINOPHIL # BLD AUTO: 0.5 X10'3 (ref 0–0.9)
EOSINOPHIL NFR BLD AUTO: 13.3 % (ref 0–6)
ERYTHROCYTE [DISTWIDTH] IN BLOOD BY AUTOMATED COUNT: 12.9 % (ref 11.5–14.5)
GFR SERPL CREATININE-BSD FRML MDRD: > 90 ML/MIN
GLUCOSE SERPL-MCNC: 87 MG/DL (ref 70–104)
HCT VFR BLD AUTO: 38.9 % (ref 42–52)
HGB BLD-MCNC: 13.3 G/DL (ref 14–17.9)
LYMPHOCYTES # BLD AUTO: 1.2 X10'3 (ref 1.1–4.8)
LYMPHOCYTES NFR BLD AUTO: 31.5 % (ref 21–51)
MCH RBC QN AUTO: 31 PG (ref 27–31)
MCHC RBC AUTO-ENTMCNC: 34.1 G/DL (ref 33–36.5)
MCV RBC AUTO: 91.1 FL (ref 78–98)
MONOCYTES # BLD AUTO: 0.3 X10'3 (ref 0–0.9)
MONOCYTES NFR BLD AUTO: 7 % (ref 2–12)
NEUTROPHILS # BLD AUTO: 1.9 X10'3 (ref 1.8–7.7)
NEUTROPHILS NFR BLD AUTO: 47.4 % (ref 42–75)
PLATELET # BLD AUTO: 168 X10'3 (ref 140–440)
PMV BLD AUTO: 7.5 FL (ref 7.4–10.4)
POTASSIUM SERPL-SCNC: 4.6 MMOL/L (ref 3.5–5.1)
PROT SERPL-MCNC: 7 G/DL (ref 6.4–8.2)
RBC # BLD AUTO: 4.28 X10'6 (ref 4.7–6.1)
SODIUM SERPL-SCNC: 140 MMOL/L (ref 135–145)
WBC # BLD AUTO: 3.9 X10'3 (ref 4.5–11)

## 2023-02-22 RX ADMIN — BENZTROPINE MESYLATE SCH MG: 1 TABLET ORAL at 21:12

## 2023-02-22 RX ADMIN — BUPRENORPHINE AND NALOXONE SCH TAB: 2; .5 TABLET SUBLINGUAL at 15:26

## 2023-02-22 RX ADMIN — NICOTINE SCH PATCH: 14 PATCH, EXTENDED RELEASE TRANSDERMAL at 07:31

## 2023-02-22 RX ADMIN — Medication SCH MMU: at 07:30

## 2023-02-22 RX ADMIN — BENZTROPINE MESYLATE SCH MG: 1 TABLET ORAL at 07:30

## 2023-02-22 RX ADMIN — DOCUSATE SODIUM SCH MG: 100 CAPSULE, LIQUID FILLED ORAL at 21:12

## 2023-02-22 RX ADMIN — BENZOCAINE PRN APPLIC: 200 SOLUTION TOPICAL at 12:15

## 2023-02-22 RX ADMIN — THERA TABS SCH EACH: TAB at 07:29

## 2023-02-22 RX ADMIN — DIVALPROEX SODIUM SCH MG: 250 TABLET, DELAYED RELEASE ORAL at 21:13

## 2023-02-22 RX ADMIN — DIVALPROEX SODIUM SCH MG: 250 TABLET, DELAYED RELEASE ORAL at 07:30

## 2023-02-22 RX ADMIN — BUPRENORPHINE AND NALOXONE SCH TAB: 2; .5 TABLET SUBLINGUAL at 21:13

## 2023-02-22 RX ADMIN — BUPRENORPHINE AND NALOXONE SCH TAB: 2; .5 TABLET SUBLINGUAL at 08:25

## 2023-02-22 RX ADMIN — DOCUSATE SODIUM SCH MG: 100 CAPSULE, LIQUID FILLED ORAL at 07:29

## 2023-02-22 RX ADMIN — Medication SCH PACKET: at 21:14

## 2023-02-22 NOTE — NUR
Nursing Progress Note      



Problem: Per 5150 pt. is here for SA by OD on street drugs after command hallucinations 
telling him to do so. Pt. states that he took "Fentanyl, black china, meth, 8 ball" in 
suicide attempt. Pt. states he feels hopeless. Pt. is also delusional, stating, "They won't 
give me my billion dollars". Pt. states, "I tried killing myself because Im a free renetta. 
They dont love me but I love them. Yes I do."



Interventions: Provide medication administration & medication management; Maintained a safe 
& supportive environment; Clear & simple instructions; Direction & encouragement  regarding 
performance of ADLs; monitored behaviors & maintained clear boundaries; Patient physical 
assessment & 1:1 patient interview; Therapeutic conversation & active listening; Patient 
education & monitoring.

 

Response: Pt. asleep at change of shift, awoke at 0645 requesting a shower. Pt showered and 
then requested his AM medications. 1:1 done at bedside and medications administered with the 
exception of his suboxone. Pt requested to take his suboxone after breakfast stating it 
makes him nauseas if he takes it on an empty stomach. Denies A/VH at this time. Denies S/HI 
stating, Im just down because I cant go to my cousins . Pt seen pacing the 
hallways with facility headphones. PRN Tylenol and anbesol administered this shift for tooth 
pain. No behaviors this shift.   





Plan: Pt is conserved and awaiting placement.

## 2023-02-22 NOTE — NUR
Nursing Progress Note      



Problem: Per 5150 pt. is here for SA by OD on street drugs after command hallucinations 
telling him to do so. Pt. states that he took "Fentanyl, black china, meth, 8 ball" in 
suicide attempt. Pt. states he feels hopeless. Pt. is also delusional, stating, "They won't 
give me my billion dollars". Pt. states, "I tried killing myself because Im a free renetta. 
They dont love me but I love them. Yes I do."



Interventions: Provide medication administration & medication management; Maintained a safe 
& supportive environment; Clear & simple instructions; Direction & encouragement  regarding 
performance of ADLs; monitored behaviors & maintained clear boundaries; Patient physical 
assessment & 1:1 patient interview; Therapeutic conversation & active listening; Patient 
education & monitoring.

 

Response: Upon turn of shift noted patient noted patient sleeping in bed. Did not get up for 
snack. Awoke for HS meds. Drowsy, sad affect, calm and cooperative. No eye contact given. 
Thoughts and speech clear. Was compliant with HS meds. No PRN meds given. Patient stated 
that he removed his nicotine patch himself earlier. Checked chest, shoulders and arms and 
found no patch. Denies SI, HI, and A/V H. Went back to sleep after medication 
administration. No odd behaviors noted. Slept throughout the night without any complaints. 
Will continue to monitor.





Plan: Pt is conserved and awaiting placement.

## 2023-02-23 VITALS — DIASTOLIC BLOOD PRESSURE: 61 MMHG | SYSTOLIC BLOOD PRESSURE: 100 MMHG

## 2023-02-23 VITALS — SYSTOLIC BLOOD PRESSURE: 115 MMHG | DIASTOLIC BLOOD PRESSURE: 64 MMHG

## 2023-02-23 RX ADMIN — THERA TABS SCH EACH: TAB at 08:30

## 2023-02-23 RX ADMIN — DOCUSATE SODIUM SCH MG: 100 CAPSULE, LIQUID FILLED ORAL at 08:29

## 2023-02-23 RX ADMIN — BENZTROPINE MESYLATE SCH MG: 1 TABLET ORAL at 20:25

## 2023-02-23 RX ADMIN — DOCUSATE SODIUM SCH MG: 100 CAPSULE, LIQUID FILLED ORAL at 20:25

## 2023-02-23 RX ADMIN — NICOTINE POLACRILEX PRN MG: 2 LOZENGE ORAL at 11:49

## 2023-02-23 RX ADMIN — BENZOCAINE PRN APPLIC: 200 SOLUTION TOPICAL at 09:01

## 2023-02-23 RX ADMIN — BENZTROPINE MESYLATE SCH MG: 1 TABLET ORAL at 08:29

## 2023-02-23 RX ADMIN — DIVALPROEX SODIUM SCH MG: 250 TABLET, DELAYED RELEASE ORAL at 08:30

## 2023-02-23 RX ADMIN — BUPRENORPHINE AND NALOXONE SCH TAB: 2; .5 TABLET SUBLINGUAL at 08:31

## 2023-02-23 RX ADMIN — BUPRENORPHINE AND NALOXONE SCH TAB: 2; .5 TABLET SUBLINGUAL at 14:53

## 2023-02-23 RX ADMIN — Medication SCH MMU: at 08:29

## 2023-02-23 RX ADMIN — IBUPROFEN PRN MG: 400 TABLET, FILM COATED ORAL at 14:04

## 2023-02-23 RX ADMIN — DIVALPROEX SODIUM SCH MG: 250 TABLET, DELAYED RELEASE ORAL at 20:25

## 2023-02-23 RX ADMIN — Medication SCH PACKET: at 20:25

## 2023-02-23 RX ADMIN — BENZOCAINE PRN APPLIC: 200 SOLUTION TOPICAL at 11:58

## 2023-02-23 RX ADMIN — NICOTINE SCH PATCH: 14 PATCH, EXTENDED RELEASE TRANSDERMAL at 08:28

## 2023-02-23 RX ADMIN — BUPRENORPHINE AND NALOXONE SCH TAB: 2; .5 TABLET SUBLINGUAL at 20:25

## 2023-02-23 RX ADMIN — IBUPROFEN PRN MG: 400 TABLET, FILM COATED ORAL at 08:31

## 2023-02-23 NOTE — NUR
Nursing Progress Note:



Problem : Per 5150 pt. is here for SA by OD on street drugs after command hallucinations 
telling him to do so. Pt. states that he took "Fentanyl, black china, meth, 8 ball" in 
suicide attempt. Pt. states he feels hopeless. Pt. is also delusional, stating, "they won't 
give me my billion dollars". Pt. states, "I tried killing myself because Im a free renetta. 
They dont love me but I love them. Yes I do."



Interventions : Maintained a safe and supportive environment, ensured contract for safety, 
provided clear and simple instructions, monitored behavior and maintained behavior plan, 
provided active listening and positive encouragement,and maintained Q 15min safety checks. 



Response : Received pt. sleeping in bed at the beginning of the shift. He awoke and 
continues to present with restlessness AEB pacing the unit aimlessly, wearing headphones at 
intervals. Pt. greeted this writer appropriately and continues to c/o chronic tooth pain. 
PRN Motrin, Tylenol, and Anbesol was administered with effectiveness. Pt. has an upcoming 
dentist appointment. Pt. did not make any delusional statements or exhibit any agitated 
outbursts this shift; a behavior plan remains in place and pt. appears to be responding to 
this well. Pt. does admit to some ongoing depression and anxiety r/t his desire to 
discharge.

Pt. did not attend group, but was observed to be pacing in the hallway talking to a peer at 
this time. This writer will encourage pt. to attend groups and continue to monitor him 
closely.



Plan :Pt. continues to require a safe and supportive environment and medication adjustments.

## 2023-02-23 NOTE — NUR
F/u 2/23: Pt PO mostly ~% avg meals w/ occasional fluctuations and 

snacks likely meeting estimated needs. Noted pt WBC 3.9 yesterday still 

receiving routine probiotic; EMANUEL d/w RN regarding holding probiotic until 

WBC improved if MD agreeable. LBM 2/22 per EMR. No nutrition interventions 

at this time. Will continue to follow.

Recommendations:

1) Continue Vegetarian diet per pt request; double eggs WB; cottage

cheese/peaches/salad WL; encourage PO intake. Liberalize to regular diet

if pt agreeable to assist meeting needs

2) MVI supplementation per MD

3) Bowel care PRN; consider holding probiotic w/ WBC <4.0 per physician 

discretion

4) Weekly scaled weights

-------------------------------------------------------------------------------

Addendum: 02/23/23 at 0730 by Jordan Godfrey RD

-------------------------------------------------------------------------------

Amended: Links added.

## 2023-02-23 NOTE — NUR
PRNs Administered: Ativan 2mg



Interventions Offered: Active listening, positive encouragement, and quiet environment. 



Response to Medication: Pt. reported increased anxiety and agitation r/t a telephone phone 
call with his family. He requested anxiolytic medication, medication was provided and will 
continue to monitor pt. closely.

## 2023-02-24 VITALS — SYSTOLIC BLOOD PRESSURE: 107 MMHG | DIASTOLIC BLOOD PRESSURE: 66 MMHG

## 2023-02-24 VITALS — SYSTOLIC BLOOD PRESSURE: 109 MMHG | DIASTOLIC BLOOD PRESSURE: 57 MMHG

## 2023-02-24 RX ADMIN — DIVALPROEX SODIUM SCH MG: 250 TABLET, DELAYED RELEASE ORAL at 07:11

## 2023-02-24 RX ADMIN — THERA TABS SCH EACH: TAB at 07:11

## 2023-02-24 RX ADMIN — BENZTROPINE MESYLATE SCH MG: 1 TABLET ORAL at 07:10

## 2023-02-24 RX ADMIN — IBUPROFEN PRN MG: 400 TABLET, FILM COATED ORAL at 13:10

## 2023-02-24 RX ADMIN — BUPRENORPHINE AND NALOXONE SCH TAB: 2; .5 TABLET SUBLINGUAL at 14:21

## 2023-02-24 RX ADMIN — BENZOCAINE PRN APPLIC: 200 SOLUTION TOPICAL at 07:15

## 2023-02-24 RX ADMIN — DOCUSATE SODIUM SCH MG: 100 CAPSULE, LIQUID FILLED ORAL at 20:28

## 2023-02-24 RX ADMIN — NICOTINE SCH PATCH: 14 PATCH, EXTENDED RELEASE TRANSDERMAL at 07:12

## 2023-02-24 RX ADMIN — DOCUSATE SODIUM SCH MG: 100 CAPSULE, LIQUID FILLED ORAL at 07:10

## 2023-02-24 RX ADMIN — DIVALPROEX SODIUM SCH MG: 250 TABLET, DELAYED RELEASE ORAL at 20:28

## 2023-02-24 RX ADMIN — BUPRENORPHINE AND NALOXONE SCH TAB: 2; .5 TABLET SUBLINGUAL at 20:28

## 2023-02-24 RX ADMIN — BENZOCAINE PRN APPLIC: 200 SOLUTION TOPICAL at 13:10

## 2023-02-24 RX ADMIN — BENZTROPINE MESYLATE SCH MG: 1 TABLET ORAL at 20:28

## 2023-02-24 RX ADMIN — Medication SCH PACKET: at 20:27

## 2023-02-24 RX ADMIN — BUPRENORPHINE AND NALOXONE SCH TAB: 2; .5 TABLET SUBLINGUAL at 07:11

## 2023-02-24 RX ADMIN — NICOTINE POLACRILEX PRN MG: 2 LOZENGE ORAL at 12:13

## 2023-02-24 NOTE — NUR
Nursing Progress Note:



Problem: Per 5150 pt. is here for SA by OD on street drugs after command hallucinations 
telling him to do so. Pt. states that he took "Fentanyl, black china, meth, 8 ball" in 
suicide attempt. Pt. states he feels hopeless. Pt. is also delusional, stating, "they won't 
give me my billion dollars". Pt. states, "I tried killing myself because Im a free renetta. 
They dont love me but I love them. Yes I do."



Interventions: Maintained a safe and supportive environment, ensured contract for safety, 
provided clear and simple instructions, monitored behavior and maintained behavior plan, 
provided active listening and positive encouragement,and maintained Q 15min safety checks. 



Response: Patient is pleasant and cooperative with care; compliant with medication. Patient 
denies SI, HI, A/VH; appears depressed and isolates to his room. Patient refused HS snack 
because it is "too fattening." No negative behaviors or agitation presented this shift. 
Observed sleeping and does not appear to be having difficulty. 



Plan: Pt. continues to require a safe and supportive environment and medication adjustments.

## 2023-02-24 NOTE — NUR
LVN documentation:

I have reviewed all interventions, assessments performed and documented by Johanne MORALES.

## 2023-02-24 NOTE — NUR
LVN documentation:

I have reviewed and agree with all interventions, assessments performed and documented by 
Maisha ROBLES LVN.

## 2023-02-24 NOTE — NUR
Nursing Progress Note: Mike 

 



Problem: 

Per 5150 pt. is here for SA by OD on street drugs after command hallucinations telling him 
to do so. Pt. states that he took "Fentanyl, black china, meth, 8 ball" in suicide attempt. 
Pt. states he feels hopeless. Pt. is also delusional, stating, "They won't give me my 
billion dollars". Pt. states, "I tried killing myself because Im a free renetta. Currently on 
LPS



Interventions: 

Maintained a safe and supportive environment, provided clear and simple instructions, 
attempted to orient to reality, monitored behaviors and provided structure and limit 
setting, obtained orders for seclusion and chemical restraints, provided medications as 
needed.





Response: 

Pt. received asleep at change of shift. He ate breakfast and took his medications without 
hesitation. Pt. denies SI,HI, AH,VH and continues to wait on placement. He later c/o HA and 
dental pain; PRN Tylenol and Ambesol given with good results. Pt. spent most of the morning 
walking around the unit slowly and listening to headphone. He approached this writer 
numerous times to perseverate on his recent weight loss of 7 lbs and meal planning. He 
presents with a flat affect and is down casted. He sat in the tv room with male cohorts 
briefly, and c/o RUQ dental pain and nicotine withdraw; PRN Motrin and nicotine lozenges 
administered with good results. Pt. ate all meals in the dining area with cohorts, but often 
ate alone in a chair with his tray on his lap.. Pt. approached writer at 1420 requesting 
something for anxiety, he reported Im never going to get out of here PRN Ativan 
administered and encouraged pt. to focus on positive accomplishments and recommended an 
activity to keep busy; pt. not receptive. Pt. presents with poor oral hygiene, groomed, and 
wears street clothes





Plan: 

Pt is conserved and awaiting placement


-------------------------------------------------------------------------------

Addendum: 02/24/23 at 1715 by Diana Davalos RN

-------------------------------------------------------------------------------

LVN documentation:

I have reviewed and agree with all interventions, assessments performed and documented by 
Maisha ROBLES LVN.

## 2023-02-25 VITALS — DIASTOLIC BLOOD PRESSURE: 79 MMHG | SYSTOLIC BLOOD PRESSURE: 113 MMHG

## 2023-02-25 RX ADMIN — IBUPROFEN PRN MG: 400 TABLET, FILM COATED ORAL at 13:24

## 2023-02-25 RX ADMIN — DIVALPROEX SODIUM SCH MG: 250 TABLET, DELAYED RELEASE ORAL at 07:34

## 2023-02-25 RX ADMIN — THERA TABS SCH EACH: TAB at 07:35

## 2023-02-25 RX ADMIN — NICOTINE SCH PATCH: 14 PATCH, EXTENDED RELEASE TRANSDERMAL at 07:35

## 2023-02-25 RX ADMIN — BUPRENORPHINE AND NALOXONE SCH TAB: 2; .5 TABLET SUBLINGUAL at 07:34

## 2023-02-25 RX ADMIN — BENZOCAINE PRN APPLIC: 200 SOLUTION TOPICAL at 09:24

## 2023-02-25 RX ADMIN — BENZTROPINE MESYLATE SCH MG: 1 TABLET ORAL at 07:34

## 2023-02-25 RX ADMIN — NICOTINE POLACRILEX PRN MG: 2 LOZENGE ORAL at 10:53

## 2023-02-25 RX ADMIN — BENZOCAINE PRN APPLIC: 200 SOLUTION TOPICAL at 13:24

## 2023-02-25 RX ADMIN — NICOTINE POLACRILEX PRN MG: 2 LOZENGE ORAL at 08:15

## 2023-02-25 RX ADMIN — DOCUSATE SODIUM SCH MG: 100 CAPSULE, LIQUID FILLED ORAL at 07:34

## 2023-02-25 RX ADMIN — BUPRENORPHINE AND NALOXONE SCH TAB: 2; .5 TABLET SUBLINGUAL at 14:48

## 2023-02-25 NOTE — NUR
Discharge Note: Pt. was discharged off the unit accompanied by staff and security to a 
transport vehicle which will be taking him to [facility] in [facility]. 
Pt's belongings were inventoried and returned to him by [facility] Pt's discharge packet and 
paperwork was faxed to his conservator. A copy of pt's most recent negative Covid test was 
faxed to [facility] per their request.

Pt. was administered his scheduled Ativan and Haldol prior to discharge r/t report of 
anxiety.

## 2023-02-25 NOTE — NUR
Nursing Progress Note:



Problem: Per 5150 pt. is here for SA by OD on street drugs after command hallucinations 
telling him to do so. Pt. states that he took "Fentanyl, black china, meth, 8 ball" in 
suicide attempt. Pt. states he feels hopeless. Pt. is also delusional, stating, "they won't 
give me my billion dollars". Pt. states, "I tried killing myself because Im a free renetta. 
They dont love me but I love them. Yes I do."



Interventions: Maintained a safe and supportive environment, ensured contract for safety, 
provided clear and simple instructions, monitored behavior and maintained behavior plan, 
provided active listening and positive encouragement,and maintained Q 15min safety checks. 



Response: Patient is pleasant and cooperative with are; compliant with medication. No 
Nicotine patch found. He denies MH Sx but appears depressed; patient reports to be 
"stressed" regarding discharge plans. Patient continues to isolate to his room and refuses 
HS snack; observed sleeping with no apparent difficulties. 



Plan: Pt. continues to require a safe and supportive environment and medication adjustments.

## 2023-02-25 NOTE — NUR
LVN documentation:

I have reviewed and agree with all interventions, assessments performed and documented by 
Ruthie FUENTES

## 2023-12-08 NOTE — NUR
NURSING PROGRESS NOTE



Problem : 

Per 5150 pt. is here for SA by OD on street drugs after command hallucinations telling him 
to do so. Pt. states that he took "Fentanyl, black china, meth, 8 ball" in suicide attempt.



Interventions : 

Maintained a safe and supportive environment, ensured contract for safety, provided clear 
and simple instructions, monitored behavior and provided intervention as needed, provided 
active listening and positive encouragement, encouraged independent performance of ADLs, and 
maintained Q 15min safety checks. 



Response :  Received patient sleeping at shift change. Pt slept 9.25 hours per sleep 
assessment.  Pt wakes and walks halls, which is his pattern. Pt was in a happy mood this 
morning. Noted a little more talkative with staff and more smiles. Pt reported doing 
push-ups in his room. Pt states I feel better (small smile). Another time pt said My mind 
is clearing up, the medicine is working. Being med compliant was reinforced throughout the 
day .Writer asked how he felt before pt responded I felt suicidal, depressed, like the 
world was ending. The medicine is working. Pt made one delusional statement today. He 
asked writer Are you a free Gary? No. Pt said I am, I am not proud of it. How you 
become a free Gary  the devil picks you. Pt has noticed is slurred speech and was 
wondering if it would get better. 



Pt requested PRN Klonopin in the afternoon saying "I am stressed out." Pt was talking about 
having to pay a bill from [employer] "I can't afford that." He also mentioned that his 
mother wanted him to change conservators and not to sign housing paperwork. Writer was 
unable to get a hold of pt's mom, at his request.  



Per provider progress note ammonia level and Depakote level will be redrawn in a couple of 
days. 



Plan :   Patient continues to require a safe and supportive environment, mood continues to 
improve each day. Conservatorship continues to go forward. 


-------------------------------------------------------------------------------

Addendum: 10/23/22 at 1548 by Amy Baker RN

-------------------------------------------------------------------------------

Wound care provider to bilateral feet per orders.

-------------------------------------------------------------------------------

Addendum: 10/23/22 at 1716 by Amy Baker RN

-------------------------------------------------------------------------------

Observed pt taking his medication. Sat with him and talked after each administration. 2/2 RSV infection and bacterial pneumonia    - Ok to monitor, can give cough suppressants if cough is bothersome

## 2025-01-24 NOTE — NUR
Nursing Progress Note:   



Problem: 

Per 5150 pt. is here for SA by OD on street drugs after command hallucinations telling him 
to do so. Pt. states that he took "Fentanyl, black china, meth, 8 ball" in suicide attempt.



Interventions: 

Maintained a safe and supportive environment, ensured contract for safety, provided clear 
and simple instructions, attempted to orient to reality, monitored behavior and provided 
intervention as needed, provided active listening and positive encouragement, encouraged 
independent performance of ADLs, and maintained Q 15min safety checks. 



Response: 

Patient received resting quietly in bed.  Upon awakening the patient paces the unit and 
perseverates on a hearing appointment in [place] today.  Patient is aware that he is not 
required to be present but he continues to talk about it and about whether his mom went to 
it or not.   talks to him about the hearing.  Conversation and distraction 
are helpful.  Patient has one outburst when another patient closes the door on him.  He 
yells, How dare you disrespect me like that, Punk!  Patient then goes to his room 
independently.  A short time later he apologizes stating, Im sorry about yelling at that 
one morena earlier.  I know hes not all there.  No further outburst is noted.  Patient denies 
any mental health symptoms at this time.  



Plan:

Per Dr. Davalos, pt. continues to require a safe and supportive environment. 
Conservatorship is recommended. Topical Clindamycin Pregnancy And Lactation Text: This medication is Pregnancy Category B and is considered safe during pregnancy. It is unknown if it is excreted in breast milk.